# Patient Record
Sex: MALE | Race: BLACK OR AFRICAN AMERICAN | Employment: FULL TIME | URBAN - METROPOLITAN AREA
[De-identification: names, ages, dates, MRNs, and addresses within clinical notes are randomized per-mention and may not be internally consistent; named-entity substitution may affect disease eponyms.]

---

## 2017-02-02 ENCOUNTER — TELEPHONE (OUTPATIENT)
Dept: RHEUMATOLOGY | Age: 62
End: 2017-02-02

## 2017-02-02 NOTE — TELEPHONE ENCOUNTER
Called pt to informed him that his Methotrexate refill request was denied and that labs and a f/u appt were needed. The patient stated he would do so. Call txf to psr desk for scheduling.

## 2017-02-09 ENCOUNTER — LAB ONLY (OUTPATIENT)
Dept: FAMILY MEDICINE CLINIC | Age: 62
End: 2017-02-09

## 2017-02-09 DIAGNOSIS — M05.79 RHEUMATOID ARTHRITIS INVOLVING MULTIPLE SITES WITH POSITIVE RHEUMATOID FACTOR (HCC): ICD-10-CM

## 2017-02-10 DIAGNOSIS — M06.9 RHEUMATOID ARTHRITIS INVOLVING MULTIPLE SITES, UNSPECIFIED RHEUMATOID FACTOR PRESENCE: ICD-10-CM

## 2017-02-10 LAB — ERYTHROCYTE [SEDIMENTATION RATE] IN BLOOD BY WESTERGREN METHOD: 12 MM/HR (ref 0–30)

## 2017-02-10 RX ORDER — METHOTREXATE 2.5 MG/1
TABLET ORAL
Qty: 20 TAB | Refills: 0 | Status: SHIPPED | OUTPATIENT
Start: 2017-02-10 | End: 2017-03-29 | Stop reason: SDUPTHER

## 2017-02-13 DIAGNOSIS — I10 ESSENTIAL HYPERTENSION WITH GOAL BLOOD PRESSURE LESS THAN 140/90: ICD-10-CM

## 2017-02-14 RX ORDER — VALSARTAN AND HYDROCHLOROTHIAZIDE 320; 25 MG/1; MG/1
TABLET, FILM COATED ORAL
Qty: 30 TAB | Refills: 0 | Status: SHIPPED | OUTPATIENT
Start: 2017-02-14 | End: 2017-03-29 | Stop reason: SDUPTHER

## 2017-03-20 ENCOUNTER — OFFICE VISIT (OUTPATIENT)
Dept: FAMILY MEDICINE CLINIC | Age: 62
End: 2017-03-20

## 2017-03-20 VITALS
BODY MASS INDEX: 31.41 KG/M2 | HEIGHT: 70 IN | SYSTOLIC BLOOD PRESSURE: 149 MMHG | DIASTOLIC BLOOD PRESSURE: 83 MMHG | TEMPERATURE: 97.9 F | RESPIRATION RATE: 20 BRPM | HEART RATE: 59 BPM | WEIGHT: 219.4 LBS

## 2017-03-20 DIAGNOSIS — I10 ESSENTIAL HYPERTENSION WITH GOAL BLOOD PRESSURE LESS THAN 140/90: Primary | ICD-10-CM

## 2017-03-20 RX ORDER — AMLODIPINE BESYLATE 5 MG/1
5 TABLET ORAL DAILY
Qty: 30 TAB | Refills: 1 | Status: SHIPPED | OUTPATIENT
Start: 2017-03-20 | End: 2017-04-11 | Stop reason: SDUPTHER

## 2017-03-20 NOTE — MR AVS SNAPSHOT
Visit Information Date & Time Provider Department Dept. Phone Encounter #  
 3/20/2017  9:30 AM Douglas Sinha 34 [id number] Follow-up Instructions Return in about 6 months (around 9/20/2017) for blood pressure. Upcoming Health Maintenance Date Due Pneumococcal 19-64 Highest Risk (2 of 3 - PCV13) 10/10/2008 INFLUENZA AGE 9 TO ADULT 8/1/2016 GLAUCOMA SCREENING Q2Y 6/12/2017 FOBT Q 1 YEAR AGE 50-75 9/22/2017 DTaP/Tdap/Td series (2 - Td) 9/22/2026 Allergies as of 3/20/2017  Review Complete On: 3/20/2017 By: Ferny Truong MD  
  
 Severity Noted Reaction Type Reactions Codeine  03/15/2013    Other (comments) Talking out of head Current Immunizations  Reviewed on 9/22/2016 Name Date Influenza Vaccine 10/7/2013 Pneumococcal Vaccine (Unspecified Type) 10/10/2007 Not reviewed this visit You Were Diagnosed With   
  
 Codes Comments Essential hypertension with goal blood pressure less than 140/90    -  Primary ICD-10-CM: I10 
ICD-9-CM: 401.9 Vitals BP Pulse Temp Resp Height(growth percentile) Weight(growth percentile) 149/83 (BP 1 Location: Left arm, BP Patient Position: Sitting) (!) 59 97.9 °F (36.6 °C) (Oral) 20 5' 10\" (1.778 m) 219 lb 6.4 oz (99.5 kg) BMI Smoking Status 31.48 kg/m2 Never Smoker BMI and BSA Data Body Mass Index Body Surface Area  
 31.48 kg/m 2 2.22 m 2 Preferred Pharmacy [facility] Name Phone [id number], [address] [place] [phone] Your Updated Medication List  
  
   
This list is accurate as of: 3/20/17 10:41 AM.  Always use your most recent med list.  
  
  
  
  
 ALEVE 220 mg tablet Generic drug:  naproxen sodium Take 440 mg by mouth daily as needed. amLODIPine 5 mg tablet Commonly known as:  Kayla Spruce Take 1 Tab by mouth daily. folic acid 1 mg tablet Commonly known as:  Google Take 1 Tab by mouth daily. methotrexate 2.5 mg tablet Commonly known as:  Glenora Alderman 10 mg orally once weekly PriLOSEC 20 mg capsule Generic drug:  omeprazole Take 1 Cap by mouth daily. valsartan-hydroCHLOROthiazide 320-25 mg per tablet Commonly known as:  DIOVAN-HCT  
TAKE 1 TAB BY MOUTH DAILY. Prescriptions Sent to Pharmacy Refills  
 amLODIPine (NORVASC) 5 mg tablet 1 Sig: Take 1 Tab by mouth daily. Class: Normal  
 Pharmacy: [facility]/pharmacy #[id number] #: [phone] Route: Oral  
  
Follow-up Instructions Return in about 6 months (around 9/20/2017) for blood pressure. Introducing [facility] & HEALTH SERVICES! Dear Giovanna Vu: Thank you for requesting a [employer] account. Our records indicate that you have previously registered for a [employer] account but its currently inactive. Please call our [employer] support line at [phone]. Additional Information If you have questions, please visit the Frequently Asked Questions section of the [employer] website at [email or web address]/[email or web address]/. Remember, [employer] is NOT to be used for urgent needs. For medical emergencies, dial 911. Now available from your iPhone and Android! Please provide this summary of care documentation to your next provider. Your primary care clinician is listed as Stephani Suazo. If you have any questions after today's visit, please call [phone].

## 2017-03-29 DIAGNOSIS — M06.9 RHEUMATOID ARTHRITIS INVOLVING MULTIPLE SITES, UNSPECIFIED RHEUMATOID FACTOR PRESENCE: ICD-10-CM

## 2017-03-29 DIAGNOSIS — I10 ESSENTIAL HYPERTENSION WITH GOAL BLOOD PRESSURE LESS THAN 140/90: ICD-10-CM

## 2017-03-29 RX ORDER — METHOTREXATE 2.5 MG/1
TABLET ORAL
Qty: 20 TAB | Refills: 0 | Status: SHIPPED | OUTPATIENT
Start: 2017-03-29 | End: 2017-05-11 | Stop reason: SDUPTHER

## 2017-03-30 NOTE — TELEPHONE ENCOUNTER
Called and informed the patient that his methotrexate has been refilled and that he should schedule a f/u appt. Ms. Etta Moser stated he would.

## 2017-03-31 RX ORDER — VALSARTAN AND HYDROCHLOROTHIAZIDE 320; 25 MG/1; MG/1
TABLET, FILM COATED ORAL
Qty: 30 TAB | Refills: 1 | Status: SHIPPED | OUTPATIENT
Start: 2017-03-31 | End: 2017-04-11 | Stop reason: SDUPTHER

## 2017-04-11 ENCOUNTER — OFFICE VISIT (OUTPATIENT)
Dept: FAMILY MEDICINE CLINIC | Age: 62
End: 2017-04-11

## 2017-04-11 VITALS
SYSTOLIC BLOOD PRESSURE: 140 MMHG | RESPIRATION RATE: 20 BRPM | DIASTOLIC BLOOD PRESSURE: 82 MMHG | WEIGHT: 216 LBS | HEIGHT: 70 IN | BODY MASS INDEX: 30.92 KG/M2 | HEART RATE: 63 BPM | TEMPERATURE: 97.7 F

## 2017-04-11 DIAGNOSIS — I10 ESSENTIAL HYPERTENSION WITH GOAL BLOOD PRESSURE LESS THAN 140/90: ICD-10-CM

## 2017-04-11 DIAGNOSIS — I10 ESSENTIAL HYPERTENSION: Primary | ICD-10-CM

## 2017-04-11 RX ORDER — AMLODIPINE BESYLATE 5 MG/1
5 TABLET ORAL DAILY
Qty: 30 TAB | Refills: 5 | Status: SHIPPED | OUTPATIENT
Start: 2017-04-11 | End: 2017-11-07 | Stop reason: SDUPTHER

## 2017-04-11 RX ORDER — ACETAMINOPHEN 500 MG
TABLET ORAL
Qty: 1 KIT | Refills: 0 | Status: SHIPPED | OUTPATIENT
Start: 2017-04-11 | End: 2019-03-01

## 2017-04-11 RX ORDER — VALSARTAN AND HYDROCHLOROTHIAZIDE 320; 25 MG/1; MG/1
TABLET, FILM COATED ORAL
Qty: 30 TAB | Refills: 5 | Status: SHIPPED | OUTPATIENT
Start: 2017-04-11 | End: 2017-11-07 | Stop reason: SDUPTHER

## 2017-04-11 NOTE — MR AVS SNAPSHOT
Visit Information Date & Time Provider Department Dept. Phone Encounter #  
 4/11/2017 11:00 AM Douglas Castillo 34 [id number] Follow-up Instructions Return in about 3 months (around 7/11/2017), or if BP elevated. , for follow up HTN. Upcoming Health Maintenance Date Due Pneumococcal 19-64 Highest Risk (2 of 3 - PCV13) 10/10/2008 INFLUENZA AGE 9 TO ADULT 8/1/2016 GLAUCOMA SCREENING Q2Y 6/12/2017 FOBT Q 1 YEAR AGE 50-75 9/22/2017 DTaP/Tdap/Td series (2 - Td) 9/22/2026 Allergies as of 4/11/2017  Review Complete On: 4/11/2017 By: Noman Noyola LPN Severity Noted Reaction Type Reactions Codeine  03/15/2013    Other (comments) Talking out of head Current Immunizations  Reviewed on 4/11/2017 Name Date Influenza Vaccine 10/7/2013 Pneumococcal Vaccine (Unspecified Type) 10/10/2007 Reviewed by Noman Noyola LPN on 6/63/8480 at 21:19 AM  
You Were Diagnosed With   
  
 Codes Comments Essential hypertension    -  Primary ICD-10-CM: I10 
ICD-9-CM: 401.9 Essential hypertension with goal blood pressure less than 140/90     ICD-10-CM: I10 
ICD-9-CM: 401.9 Vitals BP Pulse Temp Resp Height(growth percentile) Weight(growth percentile) 140/82 (BP 1 Location: Left arm, BP Patient Position: Sitting) 63 97.7 °F (36.5 °C) (Oral) 20 5' 10\" (1.778 m) 216 lb (98 kg) BMI Smoking Status 30.99 kg/m2 Never Smoker BMI and BSA Data Body Mass Index Body Surface Area 30.99 kg/m 2 2.2 m 2 Preferred Pharmacy Pharmacy Name Phone [facility][id number]- [facility], [address] [place] [address] [phone] Your Updated Medication List  
  
   
This list is accurate as of: 4/11/17 11:32 AM.  Always use your most recent med list.  
  
  
  
  
 ALEVE 220 mg tablet Generic drug:  naproxen sodium Take 440 mg by mouth daily as needed. amLODIPine 5 mg tablet Commonly known as:  Mani Duque Take 1 Tab by mouth daily. For hypertension Blood Pressure Monitor Kit Commonly known as:  BLOOD PRESSURE KIT Check blood pressure twice daily and record values. Goal is less than 140/90. folic acid 1 mg tablet Commonly known as:  Google Take 1 Tab by mouth daily. methotrexate 2.5 mg tablet Commonly known as:  Jovanna Aaron 10 mg orally once weekly PriLOSEC 20 mg capsule Generic drug:  omeprazole Take 1 Cap by mouth daily. valsartan-hydroCHLOROthiazide 320-25 mg per tablet Commonly known as:  DIOVAN-HCT  
TAKE 1 TAB BY MOUTH DAILY for hypertension. Prescriptions Printed Refills Blood Pressure Monitor (BLOOD PRESSURE KIT) kit 0 Sig: Check blood pressure twice daily and record values. Goal is less than 140/90. Class: Print Prescriptions Sent to Pharmacy Refills  
 valsartan-hydroCHLOROthiazide (DIOVAN-HCT) 320-25 mg per tablet 5 Sig: TAKE 1 TAB BY MOUTH DAILY for hypertension. Class: Normal  
 Pharmacy: [facility]/pharmacy #[id number] Ph #: [phone]  
 amLODIPine (NORVASC) 5 mg tablet 5 Sig: Take 1 Tab by mouth daily. For hypertension Class: Normal  
 Pharmacy: [facility]/pharmacy #[facility] Ph #: [phone] Route: Oral  
  
Follow-up Instructions Return in about 3 months (around 7/11/2017), or if BP elevated. , for follow up HTN. Patient Instructions Check blood pressure twice daily and record values. Goal is less than 140/90. Introducing [facility] & HEALTH SERVICES! Dear Ej Rivera: Thank you for requesting a [employer] account. Our records indicate that you have previously registered for a [employer] account but its currently inactive. Please call our [employer] support line at [phone]. Additional Information If you have questions, please visit the Frequently Asked Questions section of the [employer] website at [email or web address] Remember, [email or web address] is NOT to be used for urgent needs. For medical emergencies, dial 911. Now available from your iPhone and Android! Please provide this summary of care documentation to your next provider. Your primary care clinician is listed as Savannah Naidu. If you have any questions after today's visit, please call [phone].

## 2017-05-04 ENCOUNTER — TELEPHONE (OUTPATIENT)
Dept: RHEUMATOLOGY | Age: 62
End: 2017-05-04

## 2017-05-04 NOTE — TELEPHONE ENCOUNTER
Left message for patient to return phone call. Reason for call: Med refill denied. Needs labs and f/u.

## 2017-05-08 ENCOUNTER — TELEPHONE (OUTPATIENT)
Dept: RHEUMATOLOGY | Age: 62
End: 2017-05-08

## 2017-05-08 NOTE — TELEPHONE ENCOUNTER
Left message for patient to return phone call. Reason for call:    Returned pt's call. Pt needs f/u appt. His last med refill request was denied.

## 2017-05-11 ENCOUNTER — OFFICE VISIT (OUTPATIENT)
Dept: RHEUMATOLOGY | Age: 62
End: 2017-05-11

## 2017-05-11 VITALS
HEIGHT: 70 IN | DIASTOLIC BLOOD PRESSURE: 88 MMHG | RESPIRATION RATE: 16 BRPM | WEIGHT: 218 LBS | SYSTOLIC BLOOD PRESSURE: 146 MMHG | TEMPERATURE: 97.1 F | BODY MASS INDEX: 31.21 KG/M2 | HEART RATE: 58 BPM | OXYGEN SATURATION: 97 %

## 2017-05-11 DIAGNOSIS — Z79.60 LONG-TERM USE OF IMMUNOSUPPRESSANT MEDICATION: ICD-10-CM

## 2017-05-11 DIAGNOSIS — Z85.528 HISTORY OF RENAL CELL CANCER: ICD-10-CM

## 2017-05-11 DIAGNOSIS — M06.9 RHEUMATOID ARTHRITIS INVOLVING MULTIPLE SITES, UNSPECIFIED RHEUMATOID FACTOR PRESENCE: Primary | ICD-10-CM

## 2017-05-11 RX ORDER — METHOTREXATE 2.5 MG/1
TABLET ORAL
Qty: 20 TAB | Refills: 4 | Status: SHIPPED | OUTPATIENT
Start: 2017-05-11 | End: 2018-04-25 | Stop reason: SDUPTHER

## 2017-05-11 RX ORDER — METHYLPREDNISOLONE 4 MG/1
TABLET ORAL
Qty: 1 DOSE PACK | Refills: 0 | Status: SHIPPED | OUTPATIENT
Start: 2017-05-11 | End: 2017-09-18

## 2017-05-11 NOTE — PROGRESS NOTES
HISTORY OF PRESENT ILLNESS  Jose Johnson is a 59 y.o. male. HPI Patient presents for follow up of rheumatoid arthritis. This is his first visit in over one year. Overall, he has been doing okay. He may note a flare of pain every 2-3 months that will last one day. He has some pain in the knees. He notes no joint swelling. AM stiffness of a few minutes is noted. He has no fevers or weight loss. He has been taking methotrexate 10 mg weekly. He takes Aleve 2 tabs daily only if needed (taken today as out of medication). He still tries to play tennis 3 days weekly. Of note, he relates that he became a grandfather a few months ago.   Interim history reviewed   Current Outpatient Prescriptions   Medication Sig Dispense Refill    Blood Pressure Monitor (BLOOD PRESSURE KIT) kit Check blood pressure twice daily and record values. Goal is less than 140/90. 1 Kit 0    valsartan-hydroCHLOROthiazide (DIOVAN-HCT) 320-25 mg per tablet TAKE 1 TAB BY MOUTH DAILY for hypertension. 30 Tab 5    amLODIPine (NORVASC) 5 mg tablet Take 1 Tab by mouth daily. For hypertension 30 Tab 5    methotrexate (RHEUMATREX) 2.5 mg tablet 10 mg orally once weekly 20 Tab 0    omeprazole (PRILOSEC) 20 mg capsule Take 1 Cap by mouth daily.  folic acid (FOLVITE) 1 mg tablet Take 1 Tab by mouth daily. 90 Tab 3    naproxen sodium (ALEVE) 220 mg tablet Take 440 mg by mouth daily as needed. Allergies   Allergen Reactions    Codeine Other (comments)     Talking out of head       Review of Systems   Constitutional: Negative for fever and weight loss. Eyes: Negative for blurred vision. Respiratory: Negative for cough. Cardiovascular: Negative for leg swelling. Gastrointestinal: Negative for abdominal pain. Physical Exam   Vitals reviewed.   GENERAL: well developed, well nourished, in no apparent distress    E/N/T: Oropharynx: normal mucosa, palate, and posterior pharynx;    NECK: Neck is supple with full range of motion;    RESPIRATORY: lungs clear with BS=B/L;    CARDIOVASCULAR: regular rhythm; no murmur; no edema;    GASTROINTESTINAL: nontender; no organomegaly;    LYMPHATIC: no enlargement of cervical nodes;    MUSCULOSKELETAL:   -Full peripheral joint examination reveals no joint tenderness or synovitis. -right shoulder painful flexion and abduction to 100 degrees  -right knee painful flexion to 90 degrees  SKIN: no rashes   PSYCHIATRIC: mental status: alert and oriented x 3; good insight and judgement    Lab Results   Component Value Date/Time    WBC 5.9 02/09/2017 11:46 AM    HGB 13.0 02/09/2017 11:46 AM    HCT 38.7 02/09/2017 11:46 AM    PLATELET 605 61/66/0607 11:46 AM    MCV 96 02/09/2017 11:46 AM     Lab Results   Component Value Date/Time    Sodium 141 02/09/2017 11:46 AM    Potassium 4.1 02/09/2017 11:46 AM    Chloride 99 02/09/2017 11:46 AM    CO2 26 02/09/2017 11:46 AM    Anion gap 9 02/23/2010 04:36 PM    Glucose 86 02/09/2017 11:46 AM    BUN 16 02/09/2017 11:46 AM    Creatinine 1.15 02/09/2017 11:46 AM    BUN/Creatinine ratio 14 02/09/2017 11:46 AM    GFR est AA 78 02/09/2017 11:46 AM    GFR est non-AA 67 02/09/2017 11:46 AM    Calcium 9.0 02/09/2017 11:46 AM    Bilirubin, total 0.5 02/09/2017 11:46 AM    AST (SGOT) 18 02/09/2017 11:46 AM    Alk. phosphatase 85 02/09/2017 11:46 AM    Protein, total 7.4 02/09/2017 11:46 AM    Albumin 4.0 02/09/2017 11:46 AM    Globulin 4.0 02/23/2010 04:36 PM    A-G Ratio 1.2 02/09/2017 11:46 AM    ALT (SGPT) 11 02/09/2017 11:46 AM     Lab Results   Component Value Date/Time    Sed rate (ESR) 12 02/09/2017 11:47 AM     MHAQ 0.750  CDAI 5/11/2017   Swollen Joint Count 0   Tender Joint Count 0   Physician Assessment (0-10) 3   Patient Assessment (0-10) 2   CDAI 5       ASSESSMENT and PLAN    ICD-10-CM ICD-9-CM    1. Seropositive rheumatoid arthritis of multiple sites [facility]): Seropositive. Has been doing well on methotrexate 10 mg weekly.  He is out of medication this week and has noted a mild flare. Generally, he does not take Aleve or NSAIDS. M05.79 714.0 -methotrexate 10 mg weekly  -medrol dose pack (not with Aleve)  -comfortable, low impact exercise encouraged  -call if persistent pain or flare in between visits   2. Long-term use of immunosuppressant medication Z79.899 V58.69 CBC and CMP quarterly   3. History of renal cell cancer: s/p nephrectomy. Creatinine stable. UA last year no hematuria.   S42.616 V10.52 -encouraged him to check with cancer registry to see if any regular follow up/ monitoring suggested

## 2017-05-11 NOTE — MR AVS SNAPSHOT
Visit Information Date & Time Provider Department Dept. Phone Encounter #  
 5/11/2017  2:00 PM Gurinder Taylor MD [facility] [id number] Follow-up Instructions Return in about 6 months (around 11/11/2017). Your Appointments 7/11/2017  3:45 PM  
ROUTINE CARE with Scarlet Zaldivar MD  
[address] [place] [address]) Appt Note: 3 months BP  
 [address] [facility]  
[phone]  
  
   
 [address] [place] [address] [address] [address] Upcoming Health Maintenance Date Due Pneumococcal 19-64 Highest Risk (2 of 3 - PCV13) 10/10/2008 GLAUCOMA SCREENING Q2Y 6/12/2017 INFLUENZA AGE 9 TO ADULT 8/1/2017 FOBT Q 1 YEAR AGE 50-75 9/22/2017 DTaP/Tdap/Td series (2 - Td) 9/22/2026 Allergies as of 5/11/2017  Review Complete On: 5/11/2017 By: Gurinder Taylor MD  
  
 Severity Noted Reaction Type Reactions Codeine  03/15/2013    Other (comments) Talking out of head Current Immunizations  Reviewed on 5/11/2017 Name Date Influenza Vaccine 10/7/2013 Pneumococcal Vaccine (Unspecified Type) 10/10/2007 Reviewed by Mary Garland LPN on 6/39/1740 at  1:56 PM  
You Were Diagnosed With   
  
 Codes Comments Rheumatoid arthritis involving multiple sites, unspecified rheumatoid factor presence ([facility].)    -  Primary ICD-10-CM: M06.9 ICD-9-CM: 714.0 Long-term use of immunosuppressant medication     ICD-10-CM: Z79.899 ICD-9-CM: V58.69 History of renal cell cancer     ICD-10-CM: Z85.528 ICD-9-CM: V10.52 Vitals BP Pulse Temp Resp Height(growth percentile) Weight(growth percentile) 146/88 (BP 1 Location: Left arm, BP Patient Position: Sitting) (!) 58 97.1 °F (36.2 °C) (Oral) 16 5' 10\" (1.778 m) 218 lb (98.9 kg) SpO2 BMI Smoking Status 97% 31.28 kg/m2 Never Smoker Vitals History BMI and BSA Data Body Mass Index Body Surface Area  
 31.28 kg/m 2 2.21 m 2 Preferred Pharmacy Pharmacy Name Phone [facility] #[id number] Rubio, [address] [phone] Your Updated Medication List  
  
   
This list is accurate as of: 5/11/17  2:19 PM.  Always use your most recent med list.  
  
  
  
  
 ALEVE 220 mg tablet Generic drug:  naproxen sodium Take 440 mg by mouth daily as needed. amLODIPine 5 mg tablet Commonly known as:  Claudell Hesselbach Take 1 Tab by mouth daily. For hypertension Blood Pressure Monitor Kit Commonly known as:  BLOOD PRESSURE KIT Check blood pressure twice daily and record values. Goal is less than 140/90. folic acid 1 mg tablet Commonly known as:  Carter Take 1 Tab by mouth daily. methotrexate 2.5 mg tablet Commonly known as:  Sruthi Halle 10 mg orally once weekly  
  
 methylPREDNISolone 4 mg tablet Commonly known as:  Po Olsen Take each day's dose in AM with food PriLOSEC 20 mg capsule Generic drug:  omeprazole Take 1 Cap by mouth daily. valsartan-hydroCHLOROthiazide 320-25 mg per tablet Commonly known as:  DIOVAN-HCT  
TAKE 1 TAB BY MOUTH DAILY for hypertension. Prescriptions Sent to Pharmacy Refills  
 methylPREDNISolone (MEDROL DOSEPACK) 4 mg tablet 0 Sig: Take each day's dose in AM with food Class: Normal  
 Pharmacy: [facility]/pharmacy #[id number] Ph #: [phone]  
 methotrexate (RHEUMATREX) 2.5 mg tablet 4 Sig: 10 mg orally once weekly Class: Normal  
 Pharmacy: [facility]/pharmacy #[id number] Ph #: [phone] We Performed the Following CBC WITH AUTOMATED DIFF [05849 CPT(R)] METABOLIC PANEL, COMPREHENSIVE [38023 CPT(R)] SED RATE (ESR) [id number] CPT(R)] Follow-up Instructions Return in about 6 months (around 11/11/2017). Patient Instructions Steroid taper Methotrexate 4 pills weekly Labs every 3 months Introducing [facility] SERVICES! Dear Ej Rivera: Thank you for requesting a [employer] account. Our records indicate that you have previously registered for a [employer] account but its currently inactive. Please call our [employer] support line at [phone]. Additional Information If you have questions, please visit the Frequently Asked Questions section of the [employer] website at [email or web address] Remember, [employer] is NOT to be used for urgent needs. For medical emergencies, dial 911. Now available from your iPhone and Android! Please provide this summary of care documentation to your next provider. Your primary care clinician is listed as Bj Mccrary. If you have any questions after today's visit, please call [phone].

## 2017-05-12 LAB
ALBUMIN SERPL-MCNC: 4.3 G/DL (ref 3.6–4.8)
ALBUMIN/GLOB SERPL: 1.4 {RATIO} (ref 1.2–2.2)
ALP SERPL-CCNC: 85 IU/L (ref 39–117)
ALT SERPL-CCNC: 11 IU/L (ref 0–44)
AST SERPL-CCNC: 19 IU/L (ref 0–40)
BASOPHILS # BLD AUTO: 0 X10E3/UL (ref 0–0.2)
BASOPHILS NFR BLD AUTO: 0 %
BILIRUB SERPL-MCNC: 0.6 MG/DL (ref 0–1.2)
BUN SERPL-MCNC: 10 MG/DL (ref 8–27)
BUN/CREAT SERPL: 8 (ref 10–24)
CALCIUM SERPL-MCNC: 9.2 MG/DL (ref 8.6–10.2)
CHLORIDE SERPL-SCNC: 101 MMOL/L (ref 96–106)
CO2 SERPL-SCNC: 24 MMOL/L (ref 18–29)
CREAT SERPL-MCNC: 1.18 MG/DL (ref 0.76–1.27)
EOSINOPHIL # BLD AUTO: 0.2 X10E3/UL (ref 0–0.4)
EOSINOPHIL NFR BLD AUTO: 4 %
ERYTHROCYTE [DISTWIDTH] IN BLOOD BY AUTOMATED COUNT: 16 % (ref 12.3–15.4)
ERYTHROCYTE [SEDIMENTATION RATE] IN BLOOD BY WESTERGREN METHOD: 18 MM/HR (ref 0–30)
GLOBULIN SER CALC-MCNC: 3 G/DL (ref 1.5–4.5)
GLUCOSE SERPL-MCNC: 90 MG/DL (ref 65–99)
HCT VFR BLD AUTO: 37 % (ref 37.5–51)
HGB BLD-MCNC: 12.1 G/DL (ref 12.6–17.7)
IMM GRANULOCYTES # BLD: 0 X10E3/UL (ref 0–0.1)
IMM GRANULOCYTES NFR BLD: 0 %
LYMPHOCYTES # BLD AUTO: 1.7 X10E3/UL (ref 0.7–3.1)
LYMPHOCYTES NFR BLD AUTO: 30 %
MCH RBC QN AUTO: 32 PG (ref 26.6–33)
MCHC RBC AUTO-ENTMCNC: 32.7 G/DL (ref 31.5–35.7)
MCV RBC AUTO: 98 FL (ref 79–97)
MONOCYTES # BLD AUTO: 0.6 X10E3/UL (ref 0.1–0.9)
MONOCYTES NFR BLD AUTO: 10 %
NEUTROPHILS # BLD AUTO: 3.2 X10E3/UL (ref 1.4–7)
NEUTROPHILS NFR BLD AUTO: 56 %
PLATELET # BLD AUTO: 238 X10E3/UL (ref 150–379)
POTASSIUM SERPL-SCNC: 4.1 MMOL/L (ref 3.5–5.2)
PROT SERPL-MCNC: 7.3 G/DL (ref 6–8.5)
RBC # BLD AUTO: 3.78 X10E6/UL (ref 4.14–5.8)
SODIUM SERPL-SCNC: 141 MMOL/L (ref 134–144)
WBC # BLD AUTO: 5.6 X10E3/UL (ref 3.4–10.8)

## 2017-05-12 NOTE — PROGRESS NOTES
Called patient reviewed below results verbalized understanding. Mild anemia (Please add on B12 and folate for macrocytic anemia).  Please monitor CBC 4 weeks.

## 2017-09-18 ENCOUNTER — OFFICE VISIT (OUTPATIENT)
Dept: FAMILY MEDICINE CLINIC | Age: 62
End: 2017-09-18

## 2017-09-18 VITALS
WEIGHT: 208 LBS | RESPIRATION RATE: 18 BRPM | HEART RATE: 57 BPM | BODY MASS INDEX: 29.78 KG/M2 | TEMPERATURE: 98.1 F | HEIGHT: 70 IN | SYSTOLIC BLOOD PRESSURE: 136 MMHG | DIASTOLIC BLOOD PRESSURE: 87 MMHG

## 2017-09-18 DIAGNOSIS — I10 ESSENTIAL HYPERTENSION WITH GOAL BLOOD PRESSURE LESS THAN 140/90: Primary | ICD-10-CM

## 2017-09-18 NOTE — PROGRESS NOTES
HISTORY OF PRESENT ILLNESS  Guido Del Rio is a 57 y.o. male. Blood Pressure Check   The history is provided by the patient. This is a chronic problem. The current episode started more than 1 week ago. The problem occurs constantly. The problem has been gradually improving. Pertinent negatives include no chest pain, no abdominal pain, no headaches and no shortness of breath. Nothing aggravates the symptoms. The symptoms are relieved by medications. Treatments tried: Diovan Hct, Norvasc. The treatment provided significant relief. Review of Systems   Constitutional: Positive for weight loss. No weight gain   Eyes: Negative for blurred vision. Respiratory: Negative for shortness of breath. Cardiovascular: Negative for chest pain and leg swelling. Gastrointestinal: Negative for abdominal pain. Neurological: Negative for dizziness, sensory change, speech change, focal weakness and headaches. Visit Vitals    /87    Pulse (!) 57    Temp 98.1 °F (36.7 °C) (Oral)    Resp 18    Ht 5' 10\" (1.778 m)    Wt 208 lb (94.3 kg)    BMI 29.84 kg/m2     BP Readings from Last 3 Encounters:   09/18/17 136/87   05/11/17 146/88   04/11/17 140/82     Physical Exam   Constitutional: He is oriented to person, place, and time. He appears well-developed and well-nourished. No distress. Cardiovascular: Normal rate, regular rhythm and normal heart sounds. Exam reveals no gallop and no friction rub. No murmur heard. Pulmonary/Chest: Effort normal and breath sounds normal. No respiratory distress. He has no wheezes. He has no rales. Musculoskeletal: He exhibits no edema. Neurological: He is alert and oriented to person, place, and time. Skin: Skin is warm and dry. He is not diaphoretic. Nursing note and vitals reviewed. ASSESSMENT and PLAN    ICD-10-CM ICD-9-CM    1.  Essential hypertension with goal blood pressure less than 140/90 I10 401.9         Blood pressure controlled  Continue current plans. Follow-up Disposition:  Return in about 6 months (around 3/18/2018) for blood pressure. Reviewed plan of care. Patient has provided input and agrees with goals.

## 2017-09-18 NOTE — MR AVS SNAPSHOT
Visit Information Date & Time Provider Department Dept. Phone Encounter #  
 9/18/2017  2:45 PM Judit Escalera, [address] [id number] Follow-up Instructions Return in about 6 months (around 3/18/2018) for blood pressure. Your Appointments 11/14/2017  1:00 PM  
ESTABLISHED PATIENT with Naldo Moran MD  
[address] ([facility]) Appt Note: fu 6 months; .  
 [address] [facility] [address]  
[phone]  
  
   
 [facility] [address] Upcoming Health Maintenance Date Due Pneumococcal 19-64 Highest Risk (2 of 3 - PCV13) 10/10/2008 GLAUCOMA SCREENING Q2Y 6/12/2017 INFLUENZA AGE 9 TO ADULT 8/1/2017 FOBT Q 1 YEAR AGE 50-75 9/22/2017 DTaP/Tdap/Td series (2 - Td) 9/22/2026 Allergies as of 9/18/2017  Review Complete On: 9/18/2017 By: Judit Escalera MD  
  
 Severity Noted Reaction Type Reactions Codeine  03/15/2013    Other (comments) Talking out of head Current Immunizations  Reviewed on 5/11/2017 Name Date Influenza Vaccine 10/7/2013 ZZZ-RETIRED (DO NOT USE) Pneumococcal Vaccine (Unspecified Type) 10/10/2007 Not reviewed this visit You Were Diagnosed With   
  
 Codes Comments Essential hypertension with goal blood pressure less than 140/90    -  Primary ICD-10-CM: I10 
ICD-9-CM: 401.9 Vitals BP Pulse Temp Resp Height(growth percentile) Weight(growth percentile) 136/87 (!) 57 98.1 °F (36.7 °C) (Oral) 18 5' 10\" (1.778 m) 208 lb (94.3 kg) BMI Smoking Status 29.84 kg/m2 Never Smoker Vitals History BMI and BSA Data Body Mass Index Body Surface Area  
 29.84 kg/m 2 2.16 m 2 Preferred Pharmacy [facility] Name Phone [id number], [address] [place] [address] [phone] Your Updated Medication List  
  
   
 This list is accurate as of: 9/18/17  3:02 PM.  Always use your most recent med list.  
  
  
  
  
 ALEVE 220 mg tablet Generic drug:  naproxen sodium Take 440 mg by mouth daily as needed. amLODIPine 5 mg tablet Commonly known as:  Remonia Grates Take 1 Tab by mouth daily. For hypertension Blood Pressure Monitor Kit Commonly known as:  BLOOD PRESSURE KIT Check blood pressure twice daily and record values. Goal is less than 140/90. methotrexate 2.5 mg tablet Commonly known as:  Dunn George 10 mg orally once weekly PriLOSEC 20 mg capsule Generic drug:  omeprazole Take 1 Cap by mouth daily. valsartan-hydroCHLOROthiazide 320-25 mg per tablet Commonly known as:  DIOVAN-HCT  
TAKE 1 TAB BY MOUTH DAILY for hypertension. Follow-up Instructions Return in about 6 months (around 3/18/2018) for blood pressure. Introducing [facility] & HEALTH SERVICES! Dear Graeme Quiles: Thank you for requesting a "[employer] account. Our records indicate that you have previously registered for a "[employer] account but its currently inactive. Please call our "[employer] support line at [phone]. Additional Information If you have questions, please visit the Frequently Asked Questions section of the "[employer] website at [email or web address] Remember, "[employer] is NOT to be used for urgent needs. For medical emergencies, dial 911. Now available from your iPhone and Android! Please provide this summary of care documentation to your next provider. Your primary care clinician is listed as Candie Matt. If you have any questions after today's visit, please call [phone].

## 2017-11-07 ENCOUNTER — TELEPHONE (OUTPATIENT)
Dept: FAMILY MEDICINE CLINIC | Age: 62
End: 2017-11-07

## 2017-11-07 ENCOUNTER — TELEPHONE (OUTPATIENT)
Dept: RHEUMATOLOGY | Age: 62
End: 2017-11-07

## 2017-11-07 DIAGNOSIS — I10 ESSENTIAL HYPERTENSION WITH GOAL BLOOD PRESSURE LESS THAN 140/90: ICD-10-CM

## 2017-11-07 DIAGNOSIS — M06.9 RHEUMATOID ARTHRITIS INVOLVING MULTIPLE SITES, UNSPECIFIED RHEUMATOID FACTOR PRESENCE: ICD-10-CM

## 2017-11-07 RX ORDER — METHOTREXATE 2.5 MG/1
TABLET ORAL
Qty: 20 TAB | Refills: 4 | OUTPATIENT
Start: 2017-11-07

## 2017-11-07 RX ORDER — VALSARTAN AND HYDROCHLOROTHIAZIDE 320; 25 MG/1; MG/1
TABLET, FILM COATED ORAL
Qty: 30 TAB | Refills: 11 | Status: SHIPPED | OUTPATIENT
Start: 2017-11-07 | End: 2018-07-18 | Stop reason: ALTCHOICE

## 2017-11-07 RX ORDER — AMLODIPINE BESYLATE 5 MG/1
5 TABLET ORAL DAILY
Qty: 30 TAB | Refills: 11 | Status: SHIPPED | OUTPATIENT
Start: 2017-11-07 | End: 2018-09-25 | Stop reason: SDUPTHER

## 2017-11-07 NOTE — TELEPHONE ENCOUNTER
----- Message from Obey Hendrickson sent at 11/7/2017  1:49 PM EST -----  Regarding: Dr. Donovan Alfonso is requesting a call back with the status of the refill request for Rx's Amlodopine and Valsartan that was sent to the office yesterday 11/6/17. ([facility])[phone]

## 2017-11-07 NOTE — TELEPHONE ENCOUNTER
HIPAA verified by two patient identifiers. Spoke with patient  And explained Xochitl Yuan is currently out of the office and we are not scheduling appointments with him until further notice.  patients that need medication refills will need to be evaluated by MD.  Patients have the option of staying with our practice or we can refer  you to a local rheumatologist.Patient given and appointment with  on Feb.8, 2018, and letter of rheumatologist also sent to patients home.

## 2017-11-07 NOTE — TELEPHONE ENCOUNTER
Pt called stating his rheumatologist, Dr. Niles Vitale is not practicing at this time, pt unable to get appointment until 2/9/18, is running out of his methotrexate, can't go without this, and was advised by Dr. Candiss Merlin office to have pcp fill it until he can get in for appointment.  Jasonm

## 2017-11-15 NOTE — TELEPHONE ENCOUNTER
Patient informed, Dr. Zayra Cruz denied his request for refill of Methotrexate, she wants him to get refills from his Rheumatologist.  Dr. Zayra Cruz was made aware of fact patient has appointment with new Rheumatologist on 2/8/18, because his former Rheumatologist is no longer practicing in that office.

## 2018-04-25 ENCOUNTER — OFFICE VISIT (OUTPATIENT)
Dept: FAMILY MEDICINE CLINIC | Age: 63
End: 2018-04-25

## 2018-04-25 VITALS
HEIGHT: 70 IN | DIASTOLIC BLOOD PRESSURE: 81 MMHG | HEART RATE: 59 BPM | RESPIRATION RATE: 18 BRPM | BODY MASS INDEX: 30.78 KG/M2 | WEIGHT: 215 LBS | SYSTOLIC BLOOD PRESSURE: 123 MMHG | TEMPERATURE: 98.4 F

## 2018-04-25 DIAGNOSIS — M06.9 RHEUMATOID ARTHRITIS INVOLVING MULTIPLE SITES, UNSPECIFIED RHEUMATOID FACTOR PRESENCE: ICD-10-CM

## 2018-04-25 DIAGNOSIS — E78.00 ELEVATED CHOLESTEROL: ICD-10-CM

## 2018-04-25 DIAGNOSIS — Z13.5 GLAUCOMA SCREENING: ICD-10-CM

## 2018-04-25 DIAGNOSIS — M05.79 SEROPOSITIVE RHEUMATOID ARTHRITIS OF MULTIPLE SITES (HCC): ICD-10-CM

## 2018-04-25 DIAGNOSIS — K21.9 GASTROESOPHAGEAL REFLUX DISEASE, ESOPHAGITIS PRESENCE NOT SPECIFIED: ICD-10-CM

## 2018-04-25 DIAGNOSIS — Z12.11 ENCOUNTER FOR FIT (FECAL IMMUNOCHEMICAL TEST) SCREENING: ICD-10-CM

## 2018-04-25 DIAGNOSIS — I10 ESSENTIAL HYPERTENSION WITH GOAL BLOOD PRESSURE LESS THAN 140/90: Primary | ICD-10-CM

## 2018-04-25 RX ORDER — METHOTREXATE 2.5 MG/1
TABLET ORAL
Qty: 20 TAB | Refills: 1 | Status: SHIPPED | OUTPATIENT
Start: 2018-04-25 | End: 2018-05-07 | Stop reason: SDUPTHER

## 2018-04-25 NOTE — PROGRESS NOTES
Chief Complaint   Patient presents with    Blood Pressure Check     6 mo f/u     1. Have you been to the ER, urgent care clinic since your last visit? No Hospitalized since your last visit? No     2. Have you seen or consulted any other health care providers outside of the [facility] since your last visit? Include any pap smears or colon screening.  No    Pt declines pneumonia vaccine

## 2018-04-25 NOTE — MR AVS SNAPSHOT
[address] 
[phone] Patient: Matthew Ashton MRN: [id number] VUD:[birth year] Visit Information Date & Time Provider Department Dept. Phone Encounter #  
 4/25/2018  9:00 AM [address] [id number] Your Appointments 5/7/2018  4:00 PM  
ACUTE CARE with Valencia Cantu MD  
[address] ([facility]) Appt Note: New Patient (Deion Quezada) Refills; rescheduled from 02/08/18; Okay per Dr Irby [place] [address]  
[phone]  
  
   
 [facility] [address] [id number] Upcoming Health Maintenance Date Due Pneumococcal 19-64 Highest Risk (2 of 3 - PCV13) 10/10/2008 GLAUCOMA SCREENING Q2Y 6/12/2017 Influenza Age 5 to Adult 8/1/2017 FOBT Q 1 YEAR AGE 50-75 9/22/2017 DTaP/Tdap/Td series (2 - Td) 9/22/2026 Allergies as of 4/25/2018  Review Complete On: 4/25/2018 By: Nancy Hammond MD  
  
 Severity Noted Reaction Type Reactions Codeine  03/15/2013    Other (comments) Talking out of head Current Immunizations  Reviewed on 5/11/2017 Name Date Influenza Vaccine 10/7/2013 ZZZ-RETIRED (DO NOT USE) Pneumococcal Vaccine (Unspecified Type) 10/10/2007 Not reviewed this visit You Were Diagnosed With   
  
 Codes Comments Essential hypertension with goal blood pressure less than 140/90    -  Primary ICD-10-CM: I10 
ICD-9-CM: 401.9 Glaucoma screening     ICD-10-CM: Z13.5 ICD-9-CM: V80.1 Encounter for FIT (fecal immunochemical test) screening     ICD-10-CM: Z12.11 ICD-9-CM: V76.51 Seropositive rheumatoid arthritis of multiple sites [facility])     ICD-10-CM: M05.79 ICD-9-CM: 714.0 Elevated cholesterol     ICD-10-CM: E78.00 ICD-9-CM: 272.0  Rheumatoid arthritis involving multiple sites, unspecified rheumatoid factor presence ([facility]     ICD-10-CM: M06.9 ICD-9-CM: 714.0 Vitals BP Pulse Temp Resp Height(growth percentile) Weight(growth percentile) 123/81 (!) 59 98.4 °F (36.9 °C) (Oral) 18 5' 10\" (1.778 m) 215 lb (97.5 kg) BMI Smoking Status 30.85 kg/m2 Never Smoker Vitals History BMI and BSA Data Body Mass Index Body Surface Area  
 30.85 kg/m 2 2.19 m 2 Preferred Pharmacy Pharmacy Name Phone [facility]/PHARMACY #[id number]- [place], [address] [facility] [address] [phone] Your Updated Medication List  
  
   
This list is accurate as of 4/25/18 10:27 AM.  Always use your most recent med list. amLODIPine 5 mg tablet Commonly known as:  Skip Newcomer Take 1 Tab by mouth daily. For hypertension Blood Pressure Monitor Kit Commonly known as:  BLOOD PRESSURE KIT Check blood pressure twice daily and record values. Goal is less than 140/90. methotrexate 2.5 mg tablet Commonly known as:  Jose De Jesus Roya 10 mg orally once weekly  
  
 valsartan-hydroCHLOROthiazide 320-25 mg per tablet Commonly known as:  DIOVAN-HCT  
TAKE 1 TAB BY MOUTH DAILY for hypertension. Prescriptions Sent to Pharmacy Refills  
 methotrexate (RHEUMATREX) 2.5 mg tablet 1 Sig: 10 mg orally once weekly Class: Normal  
 Pharmacy: [facility]/pharmacy #[id number] [place], [address] [place] [address] Ph #: [phone] We Performed the Following C REACTIVE PROTEIN, QT [92749 CPT(R)] CBC WITH AUTOMATED DIFF [61344 CPT(R)] METABOLIC PANEL, COMPREHENSIVE [56275 CPT(R)] NMR LIPOPROFILE [id number] CPT(R)] OCCULT BLOOD, IMMUNOASSAY (FIT) [id number] CPT(R)] REFERRAL TO OPHTHALMOLOGY [REF57 Custom] Comments:  
 Glaucoma screening, Rheumatoid Arthritis REFERRAL TO RHEUMATOLOGY [DOB99 Custom] Comments:  
 Rheumatoid Arthritis To-Do List   
 04/25/2018 Lab:  SED RATE (ESR) Referral Information Referral ID Referred By Referred To  
  
 [id number] Rajani Cotton [facility] [address] [place], [address] [facility] Visits Status Start Date End Date 1 New Request 4/25/18 4/25/19 If your referral has a status of pending review or denied, additional information will be sent to support the outcome of this decision. Referral ID Referred By Referred To  
 [id number] Chadwick Nj MD  
   [address] [facility] [address]  
   [address], [place] [address] Phone: [phone] Fax: [phone] Visits Status Start Date End Date 1 New Request 4/25/18 4/25/19 If your referral has a status of pending review or denied, additional information will be sent to support the outcome of this decision. Introducing [facility]! Dear Ravindra Stewart: Thank you for requesting a [employer] account. Our records indicate that you have previously registered for a [employer] account but its currently inactive. Please call our [employer] support line at [phone]. Additional Information If you have questions, please visit the Frequently Asked Questions section of the [employer] website at [email or web address] Remember, [employer] is NOT to be used for urgent needs. For medical emergencies, dial 911. Now available from your iPhone and Android! Please provide this summary of care documentation to your next provider. Your primary care clinician is listed as Kimberly Saenz. If you have any questions after today's visit, please call [phone].

## 2018-04-25 NOTE — PROGRESS NOTES
HISTORY OF PRESENT ILLNESS  Ricky Solomon is a 57 y.o. male. HPI Comments: Ricky Solomon is here for follow up on his HTN, however, he mentions the Prilosec for his gastroesophageal reflux disease didn't work, so he stopped it. Also, his rheumatologist left and he is out of medications. He has an appointment next month with the new doctor. Blood Pressure Check   The history is provided by the patient. This is a chronic problem. The current episode started more than 1 week ago. The problem occurs constantly. The problem has been gradually improving. Pertinent negatives include no chest pain, no abdominal pain, no headaches and no shortness of breath. Nothing aggravates the symptoms. The symptoms are relieved by medications. Treatments tried: Norvasc, Diovan Hct. The treatment provided significant relief. Review of Systems   Constitutional: Negative for weight loss. No weight gain   Eyes: Negative for blurred vision. Respiratory: Negative for shortness of breath. Cardiovascular: Negative for chest pain and leg swelling. Gastrointestinal: Positive for heartburn. Negative for abdominal pain, nausea and vomiting. Musculoskeletal: Negative for joint pain. Neurological: Negative for dizziness, sensory change, speech change, focal weakness and headaches. Visit Vitals    /81    Pulse (!) 59    Temp 98.4 °F (36.9 °C) (Oral)    Resp 18    Ht 5' 10\" (1.778 m)    Wt 215 lb (97.5 kg)    BMI 30.85 kg/m2     BP Readings from Last 3 Encounters:   04/25/18 123/81   09/18/17 136/87   05/11/17 146/88     Physical Exam   Constitutional: He is oriented to person, place, and time. He appears well-developed and well-nourished. No distress. Cardiovascular: Normal rate, regular rhythm and normal heart sounds. Exam reveals no gallop and no friction rub. No murmur heard. Pulmonary/Chest: Effort normal and breath sounds normal. No respiratory distress. He has no wheezes.  He has no rales.   Musculoskeletal: He exhibits no edema. Neurological: He is alert and oriented to person, place, and time. Skin: Skin is warm and dry. He is not diaphoretic. Nursing note and vitals reviewed. ASSESSMENT and PLAN    ICD-10-CM ICD-9-CM    1. Essential hypertension with goal blood pressure less than 140/90 V65 921.1 METABOLIC PANEL, COMPREHENSIVE   2. Gastroesophageal reflux disease, esophagitis presence not specified K21.9 530.81    3. Seropositive rheumatoid arthritis of multiple sites (HCC) M05.79 714.0 REFERRAL TO OPHTHALMOLOGY      CBC WITH AUTOMATED DIFF      METABOLIC PANEL, COMPREHENSIVE      SED RATE (ESR)      C REACTIVE PROTEIN, QT      REFERRAL TO RHEUMATOLOGY   4. Rheumatoid arthritis involving multiple sites, unspecified rheumatoid factor presence (HCC) M06.9 714.0 methotrexate (RHEUMATREX) 2.5 mg tablet   5. Glaucoma screening Z13.5 V80.1 REFERRAL TO OPHTHALMOLOGY   6. Encounter for FIT (fecal immunochemical test) screening Z12.11 V76.51 OCCULT BLOOD, IMMUNOASSAY (FIT)   7. Elevated cholesterol E78.00 272.0 NMR LIPOPROFILE      METABOLIC PANEL, COMPREHENSIVE        Blood pressure controlled  Refractory gastroesophageal reflux disease  Rheumatoid Arthritis needing follow up and refills  Continue current plans. Labs per orders. Gastroenterology referral  I have referred him to a rheumatologist closer by    Follow-up Disposition:  Return in about 6 months (around 10/25/2018) for blood pressure. Reviewed plan of care. Patient has provided input and agrees with goals.

## 2018-04-27 LAB
ALBUMIN SERPL-MCNC: 4 G/DL (ref 3.6–4.8)
ALBUMIN/GLOB SERPL: 1.2 {RATIO} (ref 1.2–2.2)
ALP SERPL-CCNC: 80 IU/L (ref 39–117)
ALT SERPL-CCNC: 13 IU/L (ref 0–44)
AST SERPL-CCNC: 17 IU/L (ref 0–40)
BASOPHILS # BLD AUTO: 0 X10E3/UL (ref 0–0.2)
BASOPHILS NFR BLD AUTO: 0 %
BILIRUB SERPL-MCNC: 0.4 MG/DL (ref 0–1.2)
BUN SERPL-MCNC: 16 MG/DL (ref 8–27)
BUN/CREAT SERPL: 12 (ref 10–24)
CALCIUM SERPL-MCNC: 9.6 MG/DL (ref 8.6–10.2)
CHLORIDE SERPL-SCNC: 98 MMOL/L (ref 96–106)
CHOLEST SERPL-MCNC: 206 MG/DL (ref 100–199)
CO2 SERPL-SCNC: 25 MMOL/L (ref 18–29)
CREAT SERPL-MCNC: 1.37 MG/DL (ref 0.76–1.27)
CRP SERPL-MCNC: 4.6 MG/L (ref 0–4.9)
EOSINOPHIL # BLD AUTO: 0.2 X10E3/UL (ref 0–0.4)
EOSINOPHIL NFR BLD AUTO: 3 %
ERYTHROCYTE [DISTWIDTH] IN BLOOD BY AUTOMATED COUNT: 16.9 % (ref 12.3–15.4)
ERYTHROCYTE [SEDIMENTATION RATE] IN BLOOD BY WESTERGREN METHOD: 14 MM/HR (ref 0–30)
GFR SERPLBLD CREATININE-BSD FMLA CKD-EPI: 54 ML/MIN/1.73
GFR SERPLBLD CREATININE-BSD FMLA CKD-EPI: 63 ML/MIN/1.73
GLOBULIN SER CALC-MCNC: 3.4 G/DL (ref 1.5–4.5)
GLUCOSE SERPL-MCNC: 101 MG/DL (ref 65–99)
HCT VFR BLD AUTO: 38.1 % (ref 37.5–51)
HDL SERPL-SCNC: 29.2 UMOL/L
HDLC SERPL-MCNC: 57 MG/DL
HGB BLD-MCNC: 12.9 G/DL (ref 13–17.7)
IMM GRANULOCYTES # BLD: 0 X10E3/UL (ref 0–0.1)
IMM GRANULOCYTES NFR BLD: 0 %
INTERPRETATION, 910389: NORMAL
INTERPRETATION: NORMAL
LDL SERPL QN: 21.6 NM
LDL SERPL-SCNC: 1242 NMOL/L
LDL SMALL SERPL-SCNC: 145 NMOL/L
LDLC SERPL CALC-MCNC: 135 MG/DL (ref 0–99)
LP-IR SCORE SERPL: <25
LYMPHOCYTES # BLD AUTO: 1.4 X10E3/UL (ref 0.7–3.1)
LYMPHOCYTES NFR BLD AUTO: 23 %
MCH RBC QN AUTO: 32.2 PG (ref 26.6–33)
MCHC RBC AUTO-ENTMCNC: 33.9 G/DL (ref 31.5–35.7)
MCV RBC AUTO: 95 FL (ref 79–97)
MONOCYTES # BLD AUTO: 0.4 X10E3/UL (ref 0.1–0.9)
MONOCYTES NFR BLD AUTO: 7 %
NEUTROPHILS # BLD AUTO: 3.8 X10E3/UL (ref 1.4–7)
NEUTROPHILS NFR BLD AUTO: 67 %
PDF IMAGE, 910387: NORMAL
PLATELET # BLD AUTO: 245 X10E3/UL (ref 150–379)
POTASSIUM SERPL-SCNC: 4.1 MMOL/L (ref 3.5–5.2)
PROT SERPL-MCNC: 7.4 G/DL (ref 6–8.5)
RBC # BLD AUTO: 4.01 X10E6/UL (ref 4.14–5.8)
SODIUM SERPL-SCNC: 139 MMOL/L (ref 134–144)
TRIGL SERPL-MCNC: 70 MG/DL (ref 0–149)
WBC # BLD AUTO: 5.8 X10E3/UL (ref 3.4–10.8)

## 2018-04-30 ENCOUNTER — TELEPHONE (OUTPATIENT)
Dept: FAMILY MEDICINE CLINIC | Age: 63
End: 2018-04-30

## 2018-04-30 NOTE — TELEPHONE ENCOUNTER
Called and spoke with pt, and he has been advised and states understanding of referral and was provided number.

## 2018-04-30 NOTE — TELEPHONE ENCOUNTER
----- Message from Luis Ortiz MD sent at 4/29/2018  6:39 PM EDT -----  I mentioned that I was sending him to Gastroenterology for her gastroesophageal reflux disease when he was in last week, but forgot to give him a referral request.  Please call him about this. I have printed one.

## 2018-05-07 ENCOUNTER — OFFICE VISIT (OUTPATIENT)
Dept: RHEUMATOLOGY | Age: 63
End: 2018-05-07

## 2018-05-07 VITALS
DIASTOLIC BLOOD PRESSURE: 75 MMHG | WEIGHT: 210 LBS | RESPIRATION RATE: 18 BRPM | SYSTOLIC BLOOD PRESSURE: 118 MMHG | HEIGHT: 70 IN | TEMPERATURE: 97.5 F | BODY MASS INDEX: 30.06 KG/M2 | HEART RATE: 58 BPM

## 2018-05-07 DIAGNOSIS — M17.0 PRIMARY OSTEOARTHRITIS OF BOTH KNEES: ICD-10-CM

## 2018-05-07 DIAGNOSIS — Z79.60 LONG-TERM USE OF IMMUNOSUPPRESSANT MEDICATION: ICD-10-CM

## 2018-05-07 DIAGNOSIS — M06.09 SERONEGATIVE RHEUMATOID ARTHRITIS OF MULTIPLE SITES (HCC): Primary | ICD-10-CM

## 2018-05-07 DIAGNOSIS — N18.2 CKD (CHRONIC KIDNEY DISEASE) STAGE 2, GFR 60-89 ML/MIN: ICD-10-CM

## 2018-05-07 RX ORDER — METHOTREXATE 2.5 MG/1
15 TABLET ORAL
Qty: 72 TAB | Refills: 0 | Status: SHIPPED | OUTPATIENT
Start: 2018-05-08 | End: 2018-09-26 | Stop reason: SDUPTHER

## 2018-05-07 RX ORDER — FOLIC ACID 1 MG/1
TABLET ORAL DAILY
COMMUNITY
End: 2018-05-07 | Stop reason: SDUPTHER

## 2018-05-07 RX ORDER — FOLIC ACID 1 MG/1
1 TABLET ORAL DAILY
Qty: 90 TAB | Refills: 0 | Status: SHIPPED | OUTPATIENT
Start: 2018-05-07 | End: 2018-09-12 | Stop reason: SDUPTHER

## 2018-05-07 NOTE — PROGRESS NOTES
REASON FOR VISIT    This is a Dr. Lizz Paredes follow-up visit for Mr. Saadia Short for Seropositive Rheumatoid Arthritis. Inflammatory arthritis phenotype includes:  Anti-CCP positive: pending  Rheumatoid factor positive: yes (179.6)  Erosive disease: no  Extra-articular manifestations include: iriitis    Immunosuppression Screening (N/A):  Quantiferon TB: pending  PPD:  Not performed  Hepatitis B: pending  Hepatitis C: negative (9/22/2016)    Therapy History includes:  Current DMARD therapy include: methotrexate 10 mg every Tuesday  Prior DMARD therapy include: none  Discontinued DMARDs because of inefficacy: None  Discontinued DMARDs because of side effects: None    Immunization History   Administered Date(s) Administered    Influenza Vaccine 10/07/2013    ZZZ-RETIRED (DO NOT USE) Pneumococcal Vaccine (Unspecified Type) 10/10/2007       Patient Active Problem List   Diagnosis Code    Seropositive [facility] of [facility] ([facility] M05.79    Eczema L30.9    Elevated cholesterol E78.00    BPH (benign prostatic hypertrophy) N40.0    History of renal cell cancer Z85.528    Essential hypertension with goal blood pressure less than 140/90 I10    Iritis H20.9    Long-term use of immunosuppressant medication Z79.899    GERD (gastroesophageal reflux disease) K21.9    Seronegative rheumatoid arthritis of multiple sites ([facility]) M06.09    CKD (chronic kidney disease) stage 2, GFR 60-89 ml/min N18.2       HISTORY OF PRESENT ILLNESS    Mr. Saadia Short returns for a follow-up. I reviewed his medical record, including Dr. Irene Montgomery office notes, laboratories and imaging and summarized them in this note. On his visit with Dr. Lizz Paredes on 5/11/2017 \"for follow up of rheumatoid arthritis. This is his first visit in over one year. Overall, he has been doing okay. He may note a flare of pain every 2-3 months that will last one day. He has some pain in the knees. He notes no joint swelling.  AM stiffness of a few minutes is noted. He has no fevers or weight loss. He has been taking methotrexate 10 mg weekly. He takes Aleve 2 tabs daily only if needed (taken today as out of medication). He still tries to play tennis 3 days weekly. Of note, he relates that he became a grandfather a few months ago. \" Under assessment, he wrote \"Seropositive. Has been doing well on methotrexate 10 mg weekly. He is out of medication this week and has noted a mild flare. Generally, he does not take Aleve or NSAIDS. \"    He is on methotrexate 10 mg every Tuesday. He informs me he had two episodes of iritis which were treated with topicals and no recurrence. His Rheumatoid Arthritis initiall involved his knees but then spread. He has about 4-5 flares a years. He denies pain, swelling, or stiffness in joints. He plays tennis 3 days a week. He denies fever, weight loss, blurred vision, vision loss, oral ulcers, ankle swelling, dry cough, dyspnea, nausea, vomiting, dysphagia, abdominal pain, black or bloody stool, fall since last visit, rash, easy bruising and increased thirst.    Mr. Lynnette Gonzalez has continued his medications for arthritis and reports good tolerance without significant side effects. Last toxicity monitoring by blood work was done on 4/25/2018 and did not reveal any significant adverse effects, except creatinine 1.37, eGFR 63. Most recent inflammatory markers from 4/25/2018 revealed a ESR 14 mm/hr (previously 18, 12 mm/hr) and CRP 4.6 mg/L (previously N/A mg/L). The patient has not had any interval hospital admissions, infections, or surgeries. REVIEW OF SYSTEMS    A comprehensive review of systems was performed and pertinent results are documented in the HPI, review of systems is otherwise non-contributory. PAST MEDICAL HISTORY    He has a past medical history of BPH (benign prostatic hypertrophy) (11/19/2012); Eczema (11/19/2012); Elevated cholesterol (11/19/2012); GERD (gastroesophageal reflux disease) (11/11/2016);  History of renal cell cancer (11/19/2012); HTN (hypertension) (11/19/2012); Iritis (11/19/2012); RA (rheumatoid arthritis) ([facility] (11/19/2012); and Renal cell cancer ([facility] (11/19/2012). FAMILY HISTORY    His family history includes Anemia in his mother; Arthritis-rheumatoid in his sister; Stroke in his maternal grandmother. SOCIAL HISTORY    He reports that he has never smoked. He has never used smokeless tobacco. He reports that he drinks about 4.8 - 5.4 oz of alcohol per week  He reports that he does not use illicit drugs. MEDICATIONS    Current Outpatient Prescriptions   Medication Sig Dispense Refill    [START ON 5/8/2018] methotrexate (RHEUMATREX) 2.5 mg tablet Take 6 Tabs by mouth every Tuesday for 90 days. 72 Tab 0    folic acid (FOLVITE) 1 mg tablet Take 1 Tab by mouth daily for 90 days. 90 Tab 0    valsartan-hydroCHLOROthiazide (DIOVAN-HCT) 320-25 mg per tablet TAKE 1 TAB BY MOUTH DAILY for hypertension. 30 Tab 11    amLODIPine (NORVASC) 5 mg tablet Take 1 Tab by mouth daily. For hypertension 30 Tab 11    Blood Pressure Monitor (BLOOD PRESSURE KIT) kit Check blood pressure twice daily and record values. Goal is less than 140/90. 1 Kit 0        ALLERGIES    Allergies   Allergen Reactions    Codeine Other (comments)     Talking out of head       PHYSICAL EXAMINATION    Visit Vitals    /75    Pulse (!) 58    Temp 97.5 °F (36.4 °C)    Resp 18    Ht 5' 10\" (1.778 m)    Wt 210 lb (95.3 kg)    BMI 30.13 kg/m2     Body mass index is 30.13 kg/(m^2). General: Patient is alert, oriented x 3, not in acute distress    HEENT:   Sclerae are not injected and appear moist.  Oral mucous membranes are moist, there are no ulcers present. There is no alopecia. Neck is supple     Cardiovascular:  Heart is regular rate and rhythm, no murmurs. Chest:  Lungs are clear to auscultation bilaterally. No rhonchi, wheezes, or crackles.     Extremities:  Free of clubbing, cyanosis, edema    Neurological exam:  No focal sensory deficits, muscle strength is full in upper and lower extremities     Skin exam:    Psoriasis:     no  Nail Pitting:     no  Onycholysis:     no  Palmoplantar pustulosis:   no  Acne fulminans:    no  Acne conglobata:    no  Hidradenitis Suppurativa:   no  Dissecting cellulitis of the scalp:  no  Pilonidal sinus:    no  Erythema nodosum:    no  Non-Scarring Alopecia:  no  Discoid Lupus:   no  Subacute Cutaneous Lupus:   no  Heliotrope Rash:   no  Upper Arm Erythema:   no  Shawl Sign:    no  V-sign:     no  Holster sign:    no  Gottron's papules:   no  Gottron's sign:    no  Calcinosis:    no  Raynaud's Phenomenon:  no  's Hands:   no  Periungual erythema:   no  Abnormal Nailfold Capillaries: no  Livedo Reticularis:   no  Scalp Erythema:   no  Rheumatoid Nodules:   No    Musculoskeletal:  A comprehensive musculoskeletal exam was performed for all joints of each upper and lower extremity and assessed for swelling, tenderness and range of motion. Decreased ROM of right elbow with flexion deformity  Bilateral knee crepitus without effusion    Z-Deformities:   no  Conner Neck Deformities:  no  Boutonierre's Deformities:  no  Ulnar Deviation:   no  MCP Subluxation:  no    Joint Count 5/7/2018 5/11/2017   Patient pain (0-100) 40 -   MHAQ 0.375 -   Right elbow - Tender 0 -   Right elbow - Swollen 1 -   Tender Joint Count (Total) 0 -   Swollen Joint Count (Total) 1 -   Physician Assessment (0-10) 1 3   Patient Assessment (0-10) 3 2   CDAI Total (calculated) 5 -   CDAI - 5     DATA REVIEW    Laboratory     Recent laboratory results were reviewed, summarized, and discussed with the patient. Imaging    Musculoskeletal Ultrasound    None    Radiographs    Chest 9/22/2016: The cardiopericardial silhouette is within normal limits. There is no pleural effusion, pneumothorax or focal consolidation present. Bilateral Hand 3/15/2013: LEFT: Density appears normal. No articular erosions are seen.  No abnormal soft tissue swelling is seen. RIGHT: Bone density is normal. No joint erosions are seen. There are minor osteoarthritic changes at the first carpometacarpal junction. No abnormal soft tissue swelling is seen    CT Imaging    None    MR Imaging    None    DXA     None    ASSESSMENT AND PLAN    This is a follow-up visit for Mr. Kwan Schuster. 1) Seropositive Rheumatoid Arthritis. This is chronic and longstanding. He has right elbow flexion deformity with synovitis. He notes intermittent episodes of flares up to 4-5 times per year, such as in his wrists. He has been maintained on methotrexate 10 mg every Tuesday with good tolerance. He also notes a history of two episodes of iritis. He has not been taking folic acid. His CDAI was 5 with 0 tender and 1 swollen, consistent with low disease activity. I ordered a right elbow radiograph and increased methotrexate to 15 mg weekly. He has chronic kidney disease which worsened from 5/11/2017, which may have been from an increase in his ARB. 2) Long Term Use of Immunosuppressants. The patient remains on immunomodulatory medications (methotrexate) and requires frequent toxicity monitoring by blood work. Respective labs were ordered (CBC and CMP). 3) Chronic Kidney Disease Stage 2. The patient's creatinine 1.37 mg/dL and eGFR 63 (previously creatinine 1/18 mg/dL and eGFR 75). He is on Valsartan/HCTZ, with an increased dose of the ARB which may have been responsible for the decreased in eGFR. He avoids NSAIDs. 4) History of Renal Cancer. This was in 2008 and he is on remission. The patient voiced understanding of the aforementioned assessment and plan. Summary of plan was provided in the After Visit Summary patient instructions.      TODAY'S ORDERS    Orders Placed This Encounter    XR ELBOW RT MIN 3 V    methotrexate (RHEUMATREX) 2.5 mg tablet    folic acid (FOLVITE) 1 mg tablet     Future Appointments  Date Time Provider Favio Montes   8/7/2018 2:00 PM Aminah Mcmullen Jaime Osgood, MD [address]   10/25/2018 9:00 AM Shanel Osei MD, [address] [facility]    Adult Rheumatology   Musculoskeletal Ultrasound Certified  [address] [facility], [facility], [address]   Phone [phone]  Fax [phone]

## 2018-05-07 NOTE — MR AVS SNAPSHOT
[address] [facility] 350 [facility] 
[phone] Patient: Ryan Martinez MRN: [id number] Formerly [facility], 1928–1983:[birth year]01 Visit Information Date & Time Provider Department Dept. Phone Encounter #  
 5/7/2018  4:00 PM Odessa WildAnthony [id number] Follow-up Instructions Return in about 3 months (around 8/7/2018). Your Appointments 10/25/2018  9:00 AM  
ROUTINE CARE with Haris Reynolds MD  
[address] [facility] Appt Note: 6 month follow up HTN  
 320 [facility] 1007 [facility]  
[phone] [address] [facility] [address] [address] Upcoming Health Maintenance Date Due  
 GLAUCOMA SCREENING Q2Y 6/12/2017 FOBT Q 1 YEAR AGE 50-75 9/22/2017 Influenza Age 5 to Adult 8/1/2018 DTaP/Tdap/Td series (2 - Td) 9/22/2026 Allergies as of 5/7/2018  Review Complete On: 5/7/2018 By: Odessa Wild MD  
  
 Severity Noted Reaction Type Reactions Codeine  03/15/2013    Other (comments) Talking out of head Current Immunizations  Reviewed on 5/11/2017 Name Date Influenza Vaccine 10/7/2013 ZZZ-RETIRED (DO NOT USE) Pneumococcal Vaccine (Unspecified Type) 10/10/2007 Not reviewed this visit You Were Diagnosed With   
  
 Codes Comments Seronegative rheumatoid arthritis of multiple sites [facility])    -  Primary ICD-10-CM: M06.09 
ICD-9-CM: 714.0 Long-term use of immunosuppressant medication     ICD-10-CM: Z79.899 ICD-9-CM: V58.69 CKD (chronic kidney disease) stage 2, GFR 60-89 ml/min     ICD-10-CM: N18.2 ICD-9-CM: 057. 2 Vitals BP Pulse Temp Resp Height(growth percentile) Weight(growth percentile) 118/75 (!) 58 97.5 °F (36.4 °C) 18 5' 10\" (1.778 m) 210 lb (95.3 kg) BMI Smoking Status 30.13 kg/m2 Never Smoker BMI and BSA Data Body Mass Index Body Surface Area  
 30.13 kg/m 2 2.17 m 2 Preferred Pharmacy Pharmacy Name Phone [facility]/PHARMACY #[id number]- [place], [address] [place] [address] Road [phone] Your Updated Medication List  
  
   
This list is accurate as of 5/7/18  4:32 PM.  Always use your most recent med list. amLODIPine 5 mg tablet Commonly known as:  Arlington Cordial Take 1 Tab by mouth daily. For hypertension Blood Pressure Monitor Kit Commonly known as:  BLOOD PRESSURE KIT Check blood pressure twice daily and record values. Goal is less than 140/90. folic acid 1 mg tablet Commonly known as:  Carter Take 1 Tab by mouth daily for 90 days. methotrexate 2.5 mg tablet Commonly known as:  Dipesh Madera Take 6 Tabs by mouth every Tuesday for 90 days. Start taking on:  5/8/2018  
  
 valsartan-hydroCHLOROthiazide 320-25 mg per tablet Commonly known as:  DIOVAN-HCT  
TAKE 1 TAB BY MOUTH DAILY for hypertension. Prescriptions Sent to Pharmacy Refills  
 methotrexate (RHEUMATREX) 2.5 mg tablet 0 Starting on: 5/8/2018 Sig: Take 6 Tabs by mouth every Tuesday for 90 days. Class: Normal  
 Pharmacy: [facility]/pharmacy #[id number] Ph #: [phone] Route: Oral  
 folic acid (FOLVITE) 1 mg tablet 0 Sig: Take 1 Tab by mouth daily for 90 days. Class: Normal  
 Pharmacy: [facility]/pharmacy #[id number], [address] [place] [address] Ph #: [phone] Route: Oral  
  
Follow-up Instructions Return in about 3 months (around 8/7/2018). Introducing [facility] & HEALTH SERVICES! [employer] introduces [employer] patient portal. Now you can access parts of your medical record, email your doctor's office, and request medication refills online. 1. In your internet browser, go to [email or web address] Home Monitoring 2. Click on the First Time User? Click Here link in the Sign In box. You will see the New Member Sign Up page. 3. Enter your Inherited Health Access Code exactly as it appears below. You will not need to use this code after youve completed the sign-up process. If you do not sign up before the expiration date, you must request a new code. · Inherited Health Access Code: [email or web address] Expires: 7/28/2018  6:38 PM 
 
4. Enter the last four digits of your Social Security Number (xxxx) and Date of Birth (mm/dd/yyyy) as indicated and click Submit. You will be taken to the next sign-up page. 5. Create a Inherited Health ID. This will be your Inherited Health login ID and cannot be changed, so think of one that is secure and easy to remember. 6. Create a Inherited Health password. You can change your password at any time. 7. Enter your Password Reset Question and Answer. This can be used at a later time if you forget your password. 8. Enter your e-mail address. You will receive e-mail notification when new information is available in [address] 9. Click Sign Up. You can now view and download portions of your medical record. 10. Click the Download Summary menu link to download a portable copy of your medical information. If you have questions, please visit the Frequently Asked Questions section of the Inherited Health website. Remember, Inherited Health is NOT to be used for urgent needs. For medical emergencies, dial 911. Now available from your iPhone and Android! Please provide this summary of care documentation to your next provider. Your primary care clinician is listed as Astrid Tomlin. If you have any questions after today's visit, please call [phone].

## 2018-06-27 DIAGNOSIS — M06.9 RHEUMATOID ARTHRITIS INVOLVING MULTIPLE SITES, UNSPECIFIED RHEUMATOID FACTOR PRESENCE: ICD-10-CM

## 2018-07-01 RX ORDER — METHOTREXATE 2.5 MG/1
TABLET ORAL
Qty: 20 TAB | Refills: 0 | OUTPATIENT
Start: 2018-07-01

## 2018-07-18 ENCOUNTER — TELEPHONE (OUTPATIENT)
Dept: FAMILY MEDICINE CLINIC | Age: 63
End: 2018-07-18

## 2018-07-18 DIAGNOSIS — I10 ESSENTIAL HYPERTENSION WITH GOAL BLOOD PRESSURE LESS THAN 140/90: Primary | ICD-10-CM

## 2018-07-18 RX ORDER — LOSARTAN POTASSIUM AND HYDROCHLOROTHIAZIDE 25; 100 MG/1; MG/1
1 TABLET ORAL DAILY
Qty: 30 TAB | Refills: 1 | Status: SHIPPED | OUTPATIENT
Start: 2018-07-18 | End: 2018-08-07

## 2018-07-18 NOTE — TELEPHONE ENCOUNTER
Pt called stating he saw commercial regarding recall on Valsartan, is on this medication and wants call back to advise what Dr. Dilia Mendoza wants him to do.  Ldm

## 2018-07-19 NOTE — TELEPHONE ENCOUNTER
Called and spoke with pt, and he has been advised and states understanding of medication change. Pt has been scheduled for 09/11/2018.

## 2018-07-19 NOTE — TELEPHONE ENCOUNTER
Please let him know I am switching him to losartan hct and have sent it to his pharmacy. He needs to see me in 6 weeks for a blood pressure check.

## 2018-08-07 ENCOUNTER — OFFICE VISIT (OUTPATIENT)
Dept: RHEUMATOLOGY | Age: 63
End: 2018-08-07

## 2018-08-07 VITALS
TEMPERATURE: 97.9 F | HEIGHT: 70 IN | BODY MASS INDEX: 30.06 KG/M2 | RESPIRATION RATE: 16 BRPM | DIASTOLIC BLOOD PRESSURE: 79 MMHG | HEART RATE: 63 BPM | OXYGEN SATURATION: 97 % | SYSTOLIC BLOOD PRESSURE: 129 MMHG | WEIGHT: 210 LBS

## 2018-08-07 DIAGNOSIS — Z79.60 LONG-TERM USE OF IMMUNOSUPPRESSANT MEDICATION: ICD-10-CM

## 2018-08-07 DIAGNOSIS — M05.79 SEROPOSITIVE RHEUMATOID ARTHRITIS OF MULTIPLE SITES (HCC): Primary | ICD-10-CM

## 2018-08-07 RX ORDER — METHOTREXATE 2.5 MG/1
2.5 TABLET ORAL
COMMUNITY
End: 2019-03-01 | Stop reason: SDUPTHER

## 2018-08-07 NOTE — MR AVS SNAPSHOT
[address] [facility] [address] [place] [address] 
[phone] Patient: Nawaf Morales MRN: [id number] FJY:[birth year] Visit Information Date & Time Provider Department Dept. Phone Encounter #  
 8/7/2018  2:00 PM Gardenia FernandezAnthony [id number] Follow-up Instructions Return in about 3 months (around 11/7/2018). Your Appointments 9/11/2018  2:15 PM  
ROUTINE CARE with Wenceslao Mckeon MD  
[address] [facility]) Appt Note: 6 week HTN follow-medication was changed. 320 [facility] [address]  
[phone]  
  
   
 [address] [place] [address]  
  
    
 10/25/2018  9:00 AM  
ROUTINE CARE with Wenceslao Mckeon MD  
[address] [facility]) Appt Note: 6 month follow up HTN  
 [address]  
[phone] Upcoming Health Maintenance Date Due FOBT Q 1 YEAR AGE 50-75 9/22/2017 GLAUCOMA SCREENING Q2Y 7/12/2018 Pneumococcal 65+ Low/Medium Risk (2 of 2 - PPSV23) 7/12/2018 Influenza Age 5 to Adult 8/1/2018 DTaP/Tdap/Td series (2 - Td) 9/22/2026 Allergies as of 8/7/2018  Review Complete On: 8/7/2018 By: Gardenia Fernandez MD  
  
 Severity Noted Reaction Type Reactions Codeine  03/15/2013    Other (comments) Talking out of head Current Immunizations  Reviewed on 5/11/2017 Name Date Influenza Vaccine 10/7/2013 ZZZ-RETIRED (DO NOT USE) Pneumococcal Vaccine (Unspecified Type) 10/10/2007 Not reviewed this visit You Were Diagnosed With   
  
 Codes Comments Seropositive rheumatoid arthritis of multiple sites [facility])    -  Primary ICD-10-CM: M05.79 ICD-9-CM: 714.0 Long-term use of immunosuppressant medication     ICD-10-CM: Z79.899 ICD-9-CM: V58.69 Vitals BP Pulse Temp Resp Height(growth percentile) Weight(growth percentile) 129/79 (BP 1 Location: Left arm, BP Patient Position: Sitting) 63 97.9 °F (36.6 °C) (Oral) 16 5' 10\" (1.778 m) 210 lb (95.3 kg) SpO2 BMI Smoking Status 97% 30.13 kg/m2 Never Smoker Vitals History BMI and BSA Data Body Mass Index Body Surface Area  
 30.13 kg/m 2 2.17 m 2 Preferred Pharmacy Pharmacy Name Phone [facility]/PHARMACY #[id number] Cano, [address] [facility] [phone] Your Updated Medication List  
  
   
This list is accurate as of 8/7/18  2:34 PM.  Always use your most recent med list. amLODIPine 5 mg tablet Commonly known as:  Natasha Downs Take 1 Tab by mouth daily. For hypertension Blood Pressure Monitor Kit Commonly known as:  BLOOD PRESSURE KIT Check blood pressure twice daily and record values. Goal is less than 140/90. methotrexate 2.5 mg tablet Commonly known as:  Chasity Diaz Take 2.5 mg by mouth every Tuesday. Indications: take 6 tabs every tuesday Follow-up Instructions Return in about 3 months (around 11/7/2018). To-Do List   
 08/07/2018 Lab:  C REACTIVE PROTEIN, QT   
  
 08/07/2018 Lab:  CBC WITH AUTOMATED DIFF   
  
 08/07/2018 Lab:  CHRONIC HEPATITIS PANEL   
  
 08/07/2018 Lab:  CYCLIC CITRUL PEPTIDE AB, IGG   
  
 08/07/2018 Lab:  METABOLIC PANEL, COMPREHENSIVE   
  
 08/07/2018 Lab:  PROTEIN ELECTROPHORESIS W/ REFLX ESTEFANIA   
  
 08/07/2018 Microbiology:  QUANTIFERON TB GOLD   
  
 08/07/2018 Lab:  SED RATE (ESR) Introducing [facility] & HEALTH SERVICES! Timmy Spaulding introduces [employer] patient portal. Now you can access parts of your medical record, email your doctor's office, and request medication refills online. 1. In your internet browser, go to [email or web address] ([email or web address])/[employer] 2. Click on the First Time User? Click Here link in the Sign In box. You will see the New Member Sign Up page. 3. Enter your [employer] Access Code exactly as it appears below. You will not need to use this code after youve completed the sign-up process. If you do not sign up before the expiration date, you must request a new code. · [employer] Access Code: [email or web address] Expires: 11/5/2018  2:34 PM 
 
4. Enter the last four digits of your Social Security Number (xxxx) and Date of Birth (mm/dd/yyyy) as indicated and click Submit. You will be taken to the next sign-up page. 5. Create a [employer] ID. This will be your [employer] login ID and cannot be changed, so think of one that is secure and easy to remember. 6. Create a [employer] password. You can change your password at any time. 7. Enter your Password Reset Question and Answer. This can be used at a later time if you forget your password. 8. Enter your e-mail address. You will receive e-mail notification when new information is available in [address] 9. Click Sign Up. You can now view and download portions of your medical record. 10. Click the Download Summary menu link to download a portable copy of your medical information. If you have questions, please visit the Frequently Asked Questions section of the [employer] website. Remember, [employer] is NOT to be used for urgent needs. For medical emergencies, dial 911. Now available from your iPhone and Android! Please provide this summary of care documentation to your next provider. Your primary care clinician is listed as Leandra Browne. If you have any questions after today's visit, please call [phone].

## 2018-08-07 NOTE — PROGRESS NOTES
REASON FOR VISIT    This is a follow-up visit for Mr. Itz Argueta for Seropositive Rheumatoid Arthritis. Inflammatory arthritis phenotype includes:  Anti-CCP positive: pending  Rheumatoid factor positive: yes (179.6)  Erosive disease: no  Extra-articular manifestations include: iritis    Immunosuppression Screening (N/A):  Quantiferon TB: pending  PPD:  Not performed  Hepatitis B: pending  Hepatitis C: negative (9/22/2016)    Therapy History includes:  Current DMARD therapy include: methotrexate 15 mg every Tuesday  Prior DMARD therapy include: none  Discontinued DMARDs because of inefficacy: None  Discontinued DMARDs because of side effects: None    Immunization History   Administered Date(s) Administered    Influenza Vaccine 10/07/2013    ZZZ-RETIRED (DO NOT USE) Pneumococcal Vaccine (Unspecified Type) 10/10/2007       Patient Active Problem List   Diagnosis Code    Seropositive rheumatoid arthritis of multiple sites ([facility] M05.79    Eczema L30.9    Elevated cholesterol E78.00    BPH (benign prostatic hypertrophy) N40.0    History of renal cell cancer Z85.528    Essential hypertension with goal blood pressure less than 140/90 I10    Iritis H20.9    Long-term use of immunosuppressant medication Z79.899    GERD (gastroesophageal reflux disease) K21.9    Seronegative rheumatoid arthritis of multiple sites (HCC) M06.09    CKD (chronic kidney disease) stage 2, GFR 60-89 ml/min N18.2    Primary osteoarthritis of both knees M17.0       HISTORY OF PRESENT ILLNESS    Mr. Itz Argueta returns for a follow-up. On his last visit, I ordered a right elbow radiograph and increased methotrexate to 15 mg weekly with good tolerance. Today, he denies pain, swelling, or stiffness in his elbow. He plays tennis 3 days a week and at times has pain in his right shoulder and right knee that improves with movement. He denies interval iritis.      Mr. Itz Argueta has continued his medications for arthritis and reports good tolerance without significant side effects. Last toxicity monitoring by blood work was done on 4/25/2018 and did not reveal any significant adverse effects, except creatinine 1.37, eGFR 63. Most recent inflammatory markers from 4/25/2018 revealed a ESR 14 mm/hr (previously 18, 12 mm/hr) and CRP 4.6 mg/L (previously N/A mg/L). The patient has not had any interval hospital admissions, infections, or surgeries. REVIEW OF SYSTEMS    A comprehensive review of systems was performed and pertinent results are documented in the HPI, review of systems is otherwise non-contributory. PAST MEDICAL HISTORY    He has a past medical history of BPH (benign prostatic hypertrophy) (11/19/2012); Eczema (11/19/2012); Elevated cholesterol (11/19/2012); GERD (gastroesophageal reflux disease) (11/11/2016); History of renal cell cancer (11/19/2012); HTN (hypertension) (11/19/2012); Iritis (11/19/2012); RA (rheumatoid arthritis) ([facility] (11/19/2012); and Renal cell cancer ([facility] (11/19/2012). FAMILY HISTORY    His family history includes Anemia in his mother; Arthritis-rheumatoid in his sister; Stroke in his maternal grandmother. SOCIAL HISTORY    He reports that he has never smoked. He has never used smokeless tobacco. He reports that he drinks about 4.8 - 5.4 oz of alcohol per week  He reports that he does not use illicit drugs. MEDICATIONS    Current Outpatient Prescriptions   Medication Sig Dispense Refill    methotrexate (RHEUMATREX) 2.5 mg tablet Take 2.5 mg by mouth every Tuesday. Indications: take 6 tabs every tuesday      amLODIPine (NORVASC) 5 mg tablet Take 1 Tab by mouth daily. For hypertension 30 Tab 11    Blood Pressure Monitor (BLOOD PRESSURE KIT) kit Check blood pressure twice daily and record values.   Goal is less than 140/90. 1 Kit 0        ALLERGIES    Allergies   Allergen Reactions    Codeine Other (comments)     Talking out of head       PHYSICAL EXAMINATION    Visit Vitals    /79 (BP 1 Location: Left arm, BP Patient Position: Sitting)    Pulse 63    Temp 97.9 °F (36.6 °C) (Oral)    Resp 16    Ht 5' 10\" (1.778 m)    Wt 210 lb (95.3 kg)    SpO2 97%    BMI 30.13 kg/m2     Body mass index is 30.13 kg/(m^2). General: Patient is alert, oriented x 3, not in acute distress    HEENT:   Sclerae are not injected and appear moist.  Oral mucous membranes are moist, there are no ulcers present. There is no alopecia. Neck is supple     Cardiovascular:  Heart is regular rate and rhythm, no murmurs. Chest:  Lungs are clear to auscultation bilaterally. No rhonchi, wheezes, or crackles. Extremities:  Free of clubbing, cyanosis, edema    Neurological exam:  No focal sensory deficits, muscle strength is full in upper and lower extremities     Skin exam:    Psoriasis:     no  Nail Pitting:     no  Onycholysis:     no  Palmoplantar pustulosis:   no  Acne fulminans:    no  Acne conglobata:    no  Hidradenitis Suppurativa:   no  Dissecting cellulitis of the scalp:  no  Pilonidal sinus:    no  Erythema nodosum:    no  Non-Scarring Alopecia:  no  Discoid Lupus:   no  Subacute Cutaneous Lupus:   no  Heliotrope Rash:   no  Upper Arm Erythema:   no  Shawl Sign:    no  V-sign:     no  Holster sign:    no  Gottron's papules:   no  Gottron's sign:    no  Calcinosis:    no  Raynaud's Phenomenon:  no  's Hands:   no  Periungual erythema:   no  Abnormal Nailfold Capillaries: no  Livedo Reticularis:   no  Scalp Erythema:   no  Rheumatoid Nodules:   No    Musculoskeletal:  A comprehensive musculoskeletal exam was performed for all joints of each upper and lower extremity and assessed for swelling, tenderness and range of motion.       Decreased ROM of right elbow with flexion deformity  Bilateral knee crepitus without effusion    Z-Deformities:   no  [place] Neck Deformities:  no  Boutonierre's Deformities:  no  Ulnar Deviation:   no  MCP Subluxation:  no    Joint Count 8/7/2018 5/7/2018 5/11/2017   Patient pain (0-100) 20 40 -   MHAQ 0.125 0.375 -   Left elbow - Tender 0 - -   Left elbow - Swollen 1 - -   Right elbow - Tender - 0 -   Right elbow - Swollen - 1 -   Tender Joint Count (Total) 0 0 -   Swollen Joint Count (Total) 1 1 -   Physician Assessment (0-10) 0 1 3   Patient Assessment (0-10) 2 3 2   CDAI Total (calculated) 3 5 -   CDAI - - 5     DATA REVIEW    Laboratory     Recent laboratory results were reviewed, summarized, and discussed with the patient. Imaging    Musculoskeletal Ultrasound    None    Radiographs    Right Elbow 5/07/2018: no fracture, dislocation, effusion or other acute abnormality. Moderate joint space narrowing, mild spurring of the olecranon process. No bony erosion, no fracture. The bone is normal in mineral content. No joint effusion    Chest 9/22/2016: The cardiopericardial silhouette is within normal limits. There is no pleural effusion, pneumothorax or focal consolidation present. Bilateral Hand 3/15/2013: LEFT: Density appears normal. No articular erosions are seen. No abnormal soft tissue swelling is seen. RIGHT: Bone density is normal. No joint erosions are seen. There are minor osteoarthritic changes at the first carpometacarpal junction. No abnormal soft tissue swelling is seen    CT Imaging    None    MR Imaging    None    DXA     None    ASSESSMENT AND PLAN    This is a follow-up visit for Mr. Citlaly Mckee. 1) Seropositive Rheumatoid Arthritis with Iritis. This is chronic and longstanding. He has right elbow flexion deformity with synovitis. He notes intermittent episodes of flares up to 4-5 times per year, such as in his wrists. He is been maintained on methotrexate 15 mg every Tuesday with good tolerance. He also notes a history of two episodes of iritis without recurrence. He has not been taking folic acid. His CDAI was 3 (previously 5) with 0 tender and 1 swollen, consistent with low disease activity. 2) Long Term Use of Immunosuppressants.  The patient remains on immunomodulatory medications (methotrexate) and requires frequent toxicity monitoring by blood work. Respective labs were ordered (CBC and CMP), but he was in a hurry and could not draw them today. I provided him with future lab orders. 3) Chronic Kidney Disease Stage 2. The patient's creatinine 1.37 mg/dL and eGFR 63 (previously creatinine 1.18 mg/dL and eGFR 75). He avoids NSAIDs. 4) History of Renal Cancer. This was in 2008 and he is on remission. The patient voiced understanding of the aforementioned assessment and plan. Summary of plan was provided in the After Visit Summary patient instructions.      TODAY'S ORDERS    Orders Placed This Encounter    QUANTIFERON TB GOLD    CYCLIC CITRUL PEPTIDE AB, IGG    CBC WITH AUTOMATED DIFF    CHRONIC HEPATITIS PANEL    METABOLIC PANEL, COMPREHENSIVE    C REACTIVE PROTEIN, QT    SED RATE (ESR)    PROTEIN ELECTROPHORESIS W/ REFLX ESTEFANIA    methotrexate (RHEUMATREX) 2.5 mg tablet     Future Appointments  Date Time Provider Department Center   9/11/2018 2:15 PM Mynor Quinones MD [address] [place] [address]   10/25/2018 9:00 AM Mynor Quinones MD [facility] KITTY SCHED   11/5/2018 2:40 PM MD Yenny Toledo Aas, MD, [address] [facility]    Adult Rheumatology   Musculoskeletal Ultrasound Certified  [facility], [address] [place] [address] Road   Phone [phone]  Fax [phone]

## 2018-08-22 ENCOUNTER — OFFICE VISIT (OUTPATIENT)
Dept: FAMILY MEDICINE CLINIC | Age: 63
End: 2018-08-22

## 2018-08-22 VITALS
HEART RATE: 60 BPM | RESPIRATION RATE: 18 BRPM | BODY MASS INDEX: 30.49 KG/M2 | TEMPERATURE: 97.9 F | WEIGHT: 213 LBS | DIASTOLIC BLOOD PRESSURE: 85 MMHG | HEIGHT: 70 IN | SYSTOLIC BLOOD PRESSURE: 154 MMHG

## 2018-08-22 DIAGNOSIS — Z12.11 ENCOUNTER FOR FIT (FECAL IMMUNOCHEMICAL TEST) SCREENING: ICD-10-CM

## 2018-08-22 DIAGNOSIS — I10 ESSENTIAL HYPERTENSION WITH GOAL BLOOD PRESSURE LESS THAN 140/90: ICD-10-CM

## 2018-08-22 DIAGNOSIS — Z23 ENCOUNTER FOR IMMUNIZATION: ICD-10-CM

## 2018-08-22 DIAGNOSIS — L30.9 ECZEMA, UNSPECIFIED TYPE: Primary | ICD-10-CM

## 2018-08-22 RX ORDER — BETAMETHASONE DIPROPIONATE 0.5 MG/G
OINTMENT TOPICAL
Qty: 45 G | Refills: 2 | Status: SHIPPED | OUTPATIENT
Start: 2018-08-22 | End: 2019-10-01

## 2018-08-22 RX ORDER — VALSARTAN AND HYDROCHLOROTHIAZIDE 320; 25 MG/1; MG/1
TABLET, FILM COATED ORAL
Qty: 30 TAB | Refills: 1 | Status: SHIPPED | OUTPATIENT
Start: 2018-08-22 | End: 2018-09-25 | Stop reason: SDUPTHER

## 2018-08-22 NOTE — PROGRESS NOTES
Chief Complaint   Patient presents with    Skin Problem     excema f/u and medication refill     1. Have you been to the ER, urgent care clinic since your last visit? No Hospitalized since your last visit? No     2. Have you seen or consulted any other health care providers outside of the [address] [place] [address] since your last visit? Include any pap smears or colon screening.  No

## 2018-08-22 NOTE — PROGRESS NOTES
HISTORY OF PRESENT ILLNESS  Ramón Ventura is a 70 y.o. male. HPI Comments: Ramón Ventura is here today to follow up on his eczema and for a refill on his Diprolene. He is out and has a rash on his inner left wrist under his watch band. Also, he stopped his Diovan Hct due to the recall but did not start anything else. Hypertension   The history is provided by the patient. This is a chronic problem. The current episode started more than 1 week ago. The problem occurs constantly. The problem has been gradually worsening. Pertinent negatives include no chest pain, no abdominal pain, no headaches and no shortness of breath. Exacerbated by: stopping Diovan Hct. The symptoms are relieved by medications. Treatments tried: Norvasc. The treatment provided moderate relief. Review of Systems   Constitutional: Negative for weight loss. No weight gain   Eyes: Negative for blurred vision. Respiratory: Negative for shortness of breath. Cardiovascular: Positive for leg swelling. Negative for chest pain. Gastrointestinal: Negative for abdominal pain. Skin: Positive for itching and rash. Neurological: Negative for dizziness, sensory change, speech change, focal weakness and headaches. Visit Vitals    /85    Pulse 60    Temp 97.9 °F (36.6 °C) (Oral)    Resp 18    Ht 5' 10\" (1.778 m)    Wt 213 lb (96.6 kg)    BMI 30.56 kg/m2     BP Readings from Last 3 Encounters:   08/22/18 154/85   08/07/18 129/79   05/07/18 118/75     Physical Exam   Constitutional: He is oriented to person, place, and time. He appears well-developed and well-nourished. No distress. Cardiovascular: Normal rate, regular rhythm and normal heart sounds. Exam reveals no gallop and no friction rub. No murmur heard. Pulmonary/Chest: Effort normal and breath sounds normal. No respiratory distress. He has no wheezes. He has no rales. Musculoskeletal: He exhibits no edema.    Neurological: He is alert and oriented to person, place, and time. Skin: Skin is warm and dry. He is not diaphoretic. Nursing note and vitals reviewed. ASSESSMENT and PLAN    ICD-10-CM ICD-9-CM    1. Eczema, unspecified type L30.9 692.9 augmented betamethasone dipropionate (DIPROLENE-AF) 0.05 % ointment   2. Essential hypertension with goal blood pressure less than 140/90 I10 401.9 valsartan-hydroCHLOROthiazide (DIOVAN-HCT) 320-25 mg per tablet   3. Encounter for FIT (fecal immunochemical test) screening Z12.11 V76.51 OCCULT BLOOD IMMUNOASSAY,DIAGNOSTIC   4. Encounter for immunization Z23 V03.89 pneumococcal 23-valent (PNEUMOVAX 23) 25 mcg/0.5 mL injection        Eczema, symptomatic without Diprolene  Blood pressure elevated, off Diovan Hct  Refills per orders  Labs per orders. Restart Diovan Hct  Kit for FOBT testing given to patient  Pneumovax at pharmacy    Follow-up Disposition:  Return in about 6 weeks (around 10/3/2018) for blood pressure. Reviewed plan of care. Patient has provided input and agrees with goals.

## 2018-08-22 NOTE — MR AVS SNAPSHOT
[address] 
[phone] Patient: Joy Singh MRN: [id number] AZV:0/82/4940 Visit Information Date & Time Provider Department Dept. Phone Encounter #  
 8/22/2018 11:00 AM Hattie Councilman, Via Leland Simon [id number] Follow-up Instructions Return in about 2 months (around 10/22/2018) for blood pressure. Your Appointments 9/11/2018  2:15 PM  
ROUTINE CARE with Hattie Councilman, MD  
[address] [facility]) Appt Note: 6 week HTN follow-medication was changed. [address] [place] [address]  
[phone]  
  
   
 [address] [place] [address]  
  
    
 10/25/2018  9:00 AM  
ROUTINE CARE with Hattie Councilman, MD  
[address] [facility]) Appt Note: 6 month follow up HTN  
 [address]  
[phone]  
  
    
 11/5/2018  2:40 PM  
ESTABLISHED PATIENT with Keith Acosta MD  
[facility]) Appt Note: fu 3 mo; fu 3 mo  
 [facility] [address]  
[address] [place] [address] Upcoming Health Maintenance Date Due FOBT Q 1 YEAR AGE 50-75 9/22/2017 GLAUCOMA SCREENING Q2Y 7/12/2018 Pneumococcal 65+ Low/Medium Risk (2 of 2 - PPSV23) 7/12/2018 Influenza Age 5 to Adult 8/1/2018 DTaP/Tdap/Td series (2 - Td) 9/22/2026 Allergies as of 8/22/2018  Review Complete On: 8/22/2018 By: Hattie Councilman, MD  
  
 Severity Noted Reaction Type Reactions Codeine  03/15/2013    Other (comments) Talking out of head Current Immunizations  Reviewed on 5/11/2017 Name Date Influenza Vaccine 10/7/2013 ZZZ-RETIRED (DO NOT USE) Pneumococcal Vaccine (Unspecified Type) 10/10/2007 Not reviewed this visit You Were Diagnosed With   
  
 Codes Comments Encounter for FIT (fecal immunochemical test) screening    -  Primary ICD-10-CM: Z12.11 ICD-9-CM: V76.51 Essential hypertension with goal blood pressure less than 140/90     ICD-10-CM: I10 
ICD-9-CM: 401.9 Encounter for immunization     ICD-10-CM: K14 ICD-9-CM: V03.89 Eczema, unspecified type     ICD-10-CM: L30.9 ICD-9-CM: 692.9 Vitals BP Pulse Temp Resp Height(growth percentile) Weight(growth percentile) 154/85 60 97.9 °F (36.6 °C) (Oral) 18 5' 10\" (1.778 m) 213 lb (96.6 kg) BMI Smoking Status 30.56 kg/m2 Never Smoker Vitals History BMI and BSA Data Body Mass Index Body Surface Area 30.56 kg/m 2 2.18 m 2 Preferred Pharmacy Pharmacy Name Phone [facility]Y #[id number]- Emilie Gunter, [address] [place], [place] [phone] Your Updated Medication List  
  
   
This list is accurate as of 8/22/18 11:36 AM.  Always use your most recent med list. amLODIPine 5 mg tablet Commonly known as:  Andrea Spaulding Take 1 Tab by mouth daily. For hypertension  
  
 augmented betamethasone dipropionate 0.05 % ointment Commonly known as:  Beecher Hany Apply  to affected area two (2) times daily as needed for Skin Irritation or Itching. Blood Pressure Monitor Kit Commonly known as:  BLOOD PRESSURE KIT Check blood pressure twice daily and record values. Goal is less than 140/90. methotrexate 2.5 mg tablet Commonly known as:  Vani Sherman Take 2.5 mg by mouth every Tuesday. Indications: take 6 tabs every tuesday OTHER  
  
 pneumococcal 23-valent 25 mcg/0.5 mL injection Commonly known as:  PNEUMOVAX 23  
0.5 mL by IntraMUSCular route once for 1 dose. valsartan-hydroCHLOROthiazide 320-25 mg per tablet Commonly known as:  DIOVAN-HCT  
TAKE 1 TAB BY MOUTH DAILY for hypertension. Prescriptions Sent to Pharmacy Refills  
 pneumococcal 23-valent (PNEUMOVAX 23) 25 mcg/0.5 mL injection 0 Si.5 mL by IntraMUSCular route once for 1 dose. Class: Normal  
 [facility] [place] [address] Ph #: [phone] Route: IntraMUSCular  
 valsartan-hydroCHLOROthiazide (DIOVAN-HCT) 320-25 mg per tablet 1 Sig: TAKE 1 TAB BY MOUTH DAILY for hypertension. Class: Normal  
 Pharmacy: [facility]/pharmacy #[id number] Ph #: [phone]  
 augmented betamethasone dipropionate (DIPROLENE-AF) 0.05 % ointment 2 Sig: Apply  to affected area two (2) times daily as needed for Skin Irritation or Itching. Class: Normal  
 [id number] [address] Ph #: [phone] Route: Topical  
  
We Performed the Following OCCULT BLOOD IMMUNOASSAY,DIAGNOSTIC [88324 CPT(R)] Follow-up Instructions Return in about 2 months (around 10/22/2018) for blood pressure. Introducing [facility] & HEALTH SERVICES! Tamir Queen introduces IFTTT patient portal. Now you can access parts of your medical record, email your doctor's office, and request medication refills online. 1. In your internet browser, go to [email or web address] 2. Click on the First Time User? Click Here link in the Sign In box. You will see the New Member Sign Up page. 3. Enter your IFTTT Access Code exactly as it appears below. You will not need to use this code after youve completed the sign-up process. If you do not sign up before the expiration date, you must request a new code. · IFTTT Access Code: [email or web address] Expires: 2018  2:34 PM 
 
4. Enter the last four digits of your Social Security Number (xxxx) and Date of Birth (mm/dd/yyyy) as indicated and click Submit. You will be taken to the next sign-up page. 5. Create a WhoWantsMe ID. This will be your WhoWantsMe login ID and cannot be changed, so think of one that is secure and easy to remember. 6. Create a WhoWantsMe password. You can change your password at any time. 7. Enter your Password Reset Question and Answer. This can be used at a later time if you forget your password. 8. Enter your e-mail address. You will receive e-mail notification when new information is available in [address] 9. Click Sign Up. You can now view and download portions of your medical record. 10. Click the Download Summary menu link to download a portable copy of your medical information. If you have questions, please visit the Frequently Asked Questions section of the WhoWantsMe website. Remember, WhoWantsMe is NOT to be used for urgent needs. For medical emergencies, dial 911. Now available from your iPhone and Android! Please provide this summary of care documentation to your next provider. Your primary care clinician is listed as Basil Phoenix. If you have any questions after today's visit, please call [phone].

## 2018-09-12 DIAGNOSIS — M06.09 SERONEGATIVE RHEUMATOID ARTHRITIS OF MULTIPLE SITES (HCC): ICD-10-CM

## 2018-09-12 DIAGNOSIS — Z79.60 LONG-TERM USE OF IMMUNOSUPPRESSANT MEDICATION: ICD-10-CM

## 2018-09-12 RX ORDER — FOLIC ACID 1 MG/1
TABLET ORAL
Qty: 90 TAB | Refills: 0 | Status: SHIPPED | OUTPATIENT
Start: 2018-09-12 | End: 2018-10-01 | Stop reason: SDUPTHER

## 2018-09-25 DIAGNOSIS — I10 ESSENTIAL HYPERTENSION WITH GOAL BLOOD PRESSURE LESS THAN 140/90: ICD-10-CM

## 2018-09-25 RX ORDER — AMLODIPINE BESYLATE 5 MG/1
5 TABLET ORAL DAILY
Qty: 90 TAB | Refills: 0 | Status: SHIPPED | OUTPATIENT
Start: 2018-09-25 | End: 2018-12-29 | Stop reason: SDUPTHER

## 2018-09-25 RX ORDER — METHOTREXATE 2.5 MG/1
15 TABLET ORAL
Qty: 72 TAB | Refills: 0 | OUTPATIENT
Start: 2018-09-25

## 2018-09-25 RX ORDER — VALSARTAN AND HYDROCHLOROTHIAZIDE 320; 25 MG/1; MG/1
TABLET, FILM COATED ORAL
Qty: 90 TAB | Refills: 0 | Status: SHIPPED | OUTPATIENT
Start: 2018-09-25 | End: 2018-12-30 | Stop reason: SDUPTHER

## 2018-09-25 NOTE — TELEPHONE ENCOUNTER
He needs to have his labs drawn. I ordered them last time, but he was in a hurry, so I gave him labs to draw near him.  He needs to have the labs done before I refill methotrexate

## 2018-09-26 DIAGNOSIS — M06.09 SERONEGATIVE RHEUMATOID ARTHRITIS OF MULTIPLE SITES (HCC): ICD-10-CM

## 2018-09-28 RX ORDER — METHOTREXATE 2.5 MG/1
TABLET ORAL
Qty: 72 TAB | Refills: 0 | Status: SHIPPED | OUTPATIENT
Start: 2018-09-28 | End: 2019-02-14 | Stop reason: SDUPTHER

## 2018-10-01 ENCOUNTER — OFFICE VISIT (OUTPATIENT)
Dept: FAMILY MEDICINE CLINIC | Age: 63
End: 2018-10-01

## 2018-10-01 VITALS
TEMPERATURE: 98.2 F | HEIGHT: 70 IN | BODY MASS INDEX: 30.06 KG/M2 | DIASTOLIC BLOOD PRESSURE: 82 MMHG | SYSTOLIC BLOOD PRESSURE: 132 MMHG | HEART RATE: 63 BPM | WEIGHT: 210 LBS | RESPIRATION RATE: 18 BRPM

## 2018-10-01 DIAGNOSIS — I10 ESSENTIAL HYPERTENSION WITH GOAL BLOOD PRESSURE LESS THAN 140/90: Primary | ICD-10-CM

## 2018-10-01 DIAGNOSIS — M06.09 SERONEGATIVE RHEUMATOID ARTHRITIS OF MULTIPLE SITES (HCC): ICD-10-CM

## 2018-10-01 DIAGNOSIS — Z79.60 LONG-TERM USE OF IMMUNOSUPPRESSANT MEDICATION: ICD-10-CM

## 2018-10-01 DIAGNOSIS — N18.2 CKD (CHRONIC KIDNEY DISEASE) STAGE 2, GFR 60-89 ML/MIN: ICD-10-CM

## 2018-10-01 RX ORDER — FOLIC ACID 1 MG/1
TABLET ORAL
Qty: 90 TAB | Refills: 3 | Status: SHIPPED | OUTPATIENT
Start: 2018-10-01 | End: 2019-03-01 | Stop reason: SDUPTHER

## 2018-10-01 NOTE — MR AVS SNAPSHOT
[address] 
[phone] Patient: Destiny Cutler MRN: [id number] CGZ:0/30/0053 Visit Information Date & Time Provider Department Dept. Phone Encounter #  
 10/1/2018  8:45 AM Kevin Lopezbambi 34 [id number] Your Appointments 10/25/2018  9:00 AM  
ROUTINE CARE with Tita Doe MD  
[address] [place] [address]) Appt Note: 6 month follow up HTN  
 [address] [facility] [address]  
[phone]  
  
   
 [address] [facility] [address] [address]  
  
    
 11/5/2018  2:40 PM  
ESTABLISHED PATIENT with Shruti Olsen MD  
[address]) Appt Note: fu 3 mo; fu 3 mo  
 [address] [facility] [address]  
[address] [place] [address] Upcoming Health Maintenance Date Due Shingrix Vaccine Age 50> (1 of 2) 7/12/2003 FOBT Q 1 YEAR AGE 50-75 9/22/2017 GLAUCOMA SCREENING Q2Y 7/12/2018 Pneumococcal 65+ Low/Medium Risk (2 of 2 - PPSV23) 7/12/2018 Influenza Age 5 to Adult 8/1/2018 DTaP/Tdap/Td series (2 - Td) 9/22/2026 Allergies as of 10/1/2018  Review Complete On: 10/1/2018 By: Tita Doe MD  
  
 Severity Noted Reaction Type Reactions Codeine  03/15/2013    Other (comments) Talking out of head Current Immunizations  Reviewed on 5/11/2017 Name Date Influenza Vaccine 10/7/2013 ZZZ-RETIRED (DO NOT USE) Pneumococcal Vaccine (Unspecified Type) 10/10/2007 Not reviewed this visit You Were Diagnosed With   
  
 Codes Comments Essential hypertension with goal blood pressure less than 140/90    -  Primary ICD-10-CM: I10 
ICD-9-CM: 401.9 CKD (chronic kidney disease) stage 2, GFR 60-89 ml/min     ICD-10-CM: N18.2 ICD-9-CM: 585.2 Long-term use of immunosuppressant medication     ICD-10-CM: Z79.899 ICD-9-CM: V58.69 Seronegative rheumatoid arthritis of multiple sites [facility])     ICD-10-CM: M06.09 
ICD-9-CM: 714.0 Vitals BP Pulse Temp Resp Height(growth percentile) Weight(growth percentile) 132/82 63 98.2 °F (36.8 °C) (Oral) 18 5' 10\" (1.778 m) 210 lb (95.3 kg) BMI Smoking Status 30.13 kg/m2 Never Smoker Vitals History BMI and BSA Data Body Mass Index Body Surface Area  
 30.13 kg/m 2 2.17 m 2 Preferred Pharmacy Pharmacy Name Phone [id number]- [place], [address] [facility] [phone] Your Updated Medication List  
  
   
This list is accurate as of 10/1/18  9:15 AM.  Always use your most recent med list. amLODIPine 5 mg tablet Commonly known as:  Dahiana Harvey Take 1 Tab by mouth daily. For hypertension  
  
 augmented betamethasone dipropionate 0.05 % ointment Commonly known as:  Karina Garcialey Apply  to affected area two (2) times daily as needed for Skin Irritation or Itching. Blood Pressure Monitor Kit Commonly known as:  BLOOD PRESSURE KIT Check blood pressure twice daily and record values. Goal is less than 140/90. folic acid 1 mg tablet Commonly known as:  FOLVITE  
TAKE 1 TABLET BY MOUTH EVERY DAY  
  
 * methotrexate 2.5 mg tablet Commonly known as:  Joellyn Pretty Take 2.5 mg by mouth every Tuesday. Indications: take 6 tabs every tuesday * methotrexate 2.5 mg tablet Commonly known as:  RHEUMATREX  
TAKE 6 TABS BY MOUTH EVERY TUESDAY FOR 90 DAYS. OTHER  
  
 valsartan-hydroCHLOROthiazide 320-25 mg per tablet Commonly known as:  DIOVAN-HCT  
TAKE 1 TAB BY MOUTH DAILY for hypertension. * Notice: This list has 2 medication(s) that are the same as other medications prescribed for you.  Read the directions carefully, and ask your doctor or other care provider to review them with you. Prescriptions Sent to Pharmacy Refills  
 folic acid (FOLVITE) 1 mg tablet 3 Sig: TAKE 1 TABLET BY MOUTH EVERY DAY Class: Normal  
 Pharmacy: [facility]/pharmacy #[id number] #: [phone] We Performed the Following METABOLIC PANEL, BASIC [26692 CPT(R)] Introducing [facility] & HEALTH SERVICES! [employer] introduces Isabella Oliver patient portal. Now you can access parts of your medical record, email your doctor's office, and request medication refills online. 1. In your internet browser, go to [email or web address] 2. Click on the First Time User? Click Here link in the Sign In box. You will see the New Member Sign Up page. 3. Enter your Isabella Oliver Access Code exactly as it appears below. You will not need to use this code after youve completed the sign-up process. If you do not sign up before the expiration date, you must request a new code. · Isabella Oliver Access Code: [email or web address] Expires: 11/5/2018  2:34 PM 
 
4. Enter the last four digits of your Social Security Number (xxxx) and Date of Birth (mm/dd/yyyy) as indicated and click Submit. You will be taken to the next sign-up page. 5. Create a Isabella Oliver ID. This will be your Isabella Oliver login ID and cannot be changed, so think of one that is secure and easy to remember. 6. Create a Isabella Oliver password. You can change your password at any time. 7. Enter your Password Reset Question and Answer. This can be used at a later time if you forget your password. 8. Enter your e-mail address. You will receive e-mail notification when new information is available in [address] 9. Click Sign Up. You can now view and download portions of your medical record. 10. Click the Download Summary menu link to download a portable copy of your medical information. If you have questions, please visit the Frequently Asked Questions section of the LIKECHARITYt website. Remember, H-art ([employer]) is NOT to be used for urgent needs. For medical emergencies, dial 911. Now available from your iPhone and Android! Please provide this summary of care documentation to your next provider. Your primary care clinician is listed as Zain Harman. If you have any questions after today's visit, please call [phone].

## 2018-10-01 NOTE — LETTER
10/18/2018 8:13 AM 
 
Mr. Maggy Cruz [id number] Dear Maggy Meyer: 
 
Please find your most recent results below. Resulted Orders METABOLIC PANEL, BASIC Result Value Ref Range Glucose 99 65 - 99 mg/dL BUN 18 8 - 27 mg/dL Creatinine 1.40 (H) 0.76 - 1.27 mg/dL GFR est non-AA 52 (L) >59 mL/min/1.73 GFR est AA 61 >59 mL/min/1.73  
 BUN/Creatinine ratio 13 10 - 24 Sodium 141 134 - 144 mmol/L Potassium 4.1 3.5 - 5.2 mmol/L Chloride 104 96 - 106 mmol/L  
 CO2 22 20 - 29 mmol/L Calcium 8.8 8.6 - 10.2 mg/dL Narrative Performed at:  [address]  [id number] : Iman Flores MD, Phone:  [phone] CKD REPORT Result Value Ref Range Interpretation Note Comment:  
   Supplemental report is available. Narrative Performed at:  [address] [place] [address] [address] [place] [address]  [id number] : Wally Yo MD, Phone:  [phone] RECOMMENDATIONS: 
Your kidney function (GFR) has dropped a little more. Please avoid NSAIDS (things like Motrin, ibuprofen, Advil, Aleve, naprosyn, BC's, Goodies, etc.)  If you are taking a daily aspirin, please continue this. Also, if you should ever need IV contrast, please let them know you have kidney issues. You may takeTylenol or acetaminophen for pain. Take care! Please call me if you have any questions: [phone] Sincerely, 
 
 
Juanito Tomlinson MD

## 2018-10-01 NOTE — PROGRESS NOTES
HISTORY OF PRESENT ILLNESS  Sarah Hutchinson is a 70 y.o. male. Hypertension   The history is provided by the patient. This is a chronic problem. The current episode started more than 1 week ago. The problem occurs constantly. The problem has been gradually improving. Pertinent negatives include no chest pain, no abdominal pain, no headaches and no shortness of breath. Nothing aggravates the symptoms. The symptoms are relieved by medications. Treatments tried: Norvasc, Diovan Hct. The treatment provided significant relief. Review of Systems   Constitutional: Negative for weight loss. No weight gain   Eyes: Negative for blurred vision. Respiratory: Negative for shortness of breath. Cardiovascular: Negative for chest pain and leg swelling. Gastrointestinal: Negative for abdominal pain. Neurological: Negative for dizziness, sensory change, speech change, focal weakness and headaches. Visit Vitals    /82    Pulse 63    Temp 98.2 °F (36.8 °C) (Oral)    Resp 18    Ht 5' 10\" (1.778 m)    Wt 210 lb (95.3 kg)    BMI 30.13 kg/m2     BP Readings from Last 3 Encounters:   10/01/18 132/82   08/22/18 154/85   08/07/18 129/79       Physical Exam   Constitutional: He is oriented to person, place, and time. He appears well-developed and well-nourished. No distress. Cardiovascular: Normal rate, regular rhythm and normal heart sounds. Exam reveals no gallop and no friction rub. No murmur heard. Pulmonary/Chest: Effort normal and breath sounds normal. No respiratory distress. He has no wheezes. He has no rales. Musculoskeletal: He exhibits no edema. Neurological: He is alert and oriented to person, place, and time. Skin: Skin is warm and dry. He is not diaphoretic. Nursing note and vitals reviewed. ASSESSMENT and PLAN    ICD-10-CM ICD-9-CM    1. Essential hypertension with goal blood pressure less than 140/90 K71 316.2 METABOLIC PANEL, BASIC   2.  CKD (chronic kidney disease) stage 2, GFR 60-89 ml/min R85.7 124.5 METABOLIC PANEL, BASIC   3. Long-term use of immunosuppressant medication K79.235 Q93.53 folic acid (FOLVITE) 1 mg tablet   4. Seronegative rheumatoid arthritis of multiple sites ([facility] [place] D21.04 345.3 folic acid (FOLVITE) 1 mg tablet        Blood pressure controlled  Continue current plans. Labs per orders. Refills per orders    Follow-up Disposition:  Return in about 6 months (around 4/1/2019) for blood pressure. Reviewed plan of care. Patient has provided input and agrees with goals.

## 2018-10-01 NOTE — PROGRESS NOTES
Chief Complaint   Patient presents with    Hypertension     6 wk f/u      1. Have you been to the ER, urgent care clinic since your last visit? No  Hospitalized since your last visit? No     2. Have you seen or consulted any other health care providers outside of the [address] [place] [address] since your last visit? Include any pap smears or colon screening.  No

## 2018-10-02 LAB
BUN SERPL-MCNC: 18 MG/DL (ref 8–27)
BUN/CREAT SERPL: 13 (ref 10–24)
CALCIUM SERPL-MCNC: 8.8 MG/DL (ref 8.6–10.2)
CHLORIDE SERPL-SCNC: 104 MMOL/L (ref 96–106)
CO2 SERPL-SCNC: 22 MMOL/L (ref 20–29)
CREAT SERPL-MCNC: 1.4 MG/DL (ref 0.76–1.27)
GLUCOSE SERPL-MCNC: 99 MG/DL (ref 65–99)
INTERPRETATION: NORMAL
POTASSIUM SERPL-SCNC: 4.1 MMOL/L (ref 3.5–5.2)
SODIUM SERPL-SCNC: 141 MMOL/L (ref 134–144)

## 2018-12-29 DIAGNOSIS — I10 ESSENTIAL HYPERTENSION WITH GOAL BLOOD PRESSURE LESS THAN 140/90: ICD-10-CM

## 2018-12-30 DIAGNOSIS — I10 ESSENTIAL HYPERTENSION WITH GOAL BLOOD PRESSURE LESS THAN 140/90: ICD-10-CM

## 2018-12-31 RX ORDER — AMLODIPINE BESYLATE 5 MG/1
TABLET ORAL
Qty: 90 TAB | Refills: 0 | Status: SHIPPED | OUTPATIENT
Start: 2018-12-31 | End: 2019-04-03 | Stop reason: SDUPTHER

## 2018-12-31 RX ORDER — VALSARTAN AND HYDROCHLOROTHIAZIDE 320; 25 MG/1; MG/1
TABLET, FILM COATED ORAL
Qty: 90 TAB | Refills: 0 | Status: SHIPPED | OUTPATIENT
Start: 2018-12-31 | End: 2019-04-03 | Stop reason: SDUPTHER

## 2019-01-16 ENCOUNTER — OFFICE VISIT (OUTPATIENT)
Dept: FAMILY MEDICINE CLINIC | Age: 64
End: 2019-01-16

## 2019-01-16 VITALS
TEMPERATURE: 98.3 F | SYSTOLIC BLOOD PRESSURE: 112 MMHG | HEART RATE: 62 BPM | BODY MASS INDEX: 29.63 KG/M2 | WEIGHT: 207 LBS | DIASTOLIC BLOOD PRESSURE: 70 MMHG | HEIGHT: 70 IN | RESPIRATION RATE: 18 BRPM

## 2019-01-16 DIAGNOSIS — Z12.11 ENCOUNTER FOR FIT (FECAL IMMUNOCHEMICAL TEST) SCREENING: ICD-10-CM

## 2019-01-16 DIAGNOSIS — M76.61 ACHILLES TENDINITIS OF RIGHT LOWER EXTREMITY: ICD-10-CM

## 2019-01-16 DIAGNOSIS — M72.2 PLANTAR FASCIITIS: Primary | ICD-10-CM

## 2019-01-16 DIAGNOSIS — Z23 ENCOUNTER FOR IMMUNIZATION: ICD-10-CM

## 2019-01-16 NOTE — PROGRESS NOTES
HISTORY OF PRESENT ILLNESS  Ricky Solomon is a 70 y.o. male. Ricky Solomon presents with right foot pain starting 1 1/2 weeks ago. No initiating factors. He just woke up, put his foot on the floor and felt a sharp pain in the sole. It has gotten better. No new shoes or unusual activities. Standing makes it worse and walking on carpet and elevating it makes it better. The pain is in the heel and arch and sometimes shoots up the back of the leg. Review of Systems   Cardiovascular: Negative for leg swelling. Musculoskeletal:        Right sole pain       Visit Vitals  /70   Pulse 62   Temp 98.3 °F (36.8 °C) (Oral)   Resp 18   Ht 5' 10\" (1.778 m)   Wt 207 lb (93.9 kg)   BMI 29.70 kg/m²     Physical Exam   Constitutional: He is oriented to person, place, and time. He appears well-developed and well-nourished. No distress. Musculoskeletal:        Right foot: There is tenderness and deformity. There is no bony tenderness and normal capillary refill. Feet:    Tenderness in the arch and Achilles   Neurological: He is alert and oriented to person, place, and time. Skin: He is not diaphoretic. ASSESSMENT and PLAN    ICD-10-CM ICD-9-CM    1. Plantar fasciitis M72.2 728.71    2. Achilles tendinitis of right lower extremity M76.61 726.71    3. Encounter for FIT (fecal immunochemical test) screening Z12.11 V76.51 OCCULT BLOOD IMMUNOASSAY,DIAGNOSTIC   4. Encounter for immunization Z23 V03.89 pneumococcal 23-valent (PNEUMOVAX 23) 25 mcg/0.5 mL injection      varicella-zoster recombinant, PF, (SHINGRIX, PF,) 50 mcg/0.5 mL susr injection        Supportive, padded shoes  Advil prn  Exercises given  Pneumovax and Shingrix at pharmacy    Follow-up Disposition:  Return if symptoms worsen or fail to improve. Reviewed plan of care. Patient has provided input and agrees with goals.

## 2019-01-16 NOTE — PROGRESS NOTES
Chief Complaint   Patient presents with    Foot Pain     rt foot x 1.5 wks     1. Have you been to the ER, urgent care clinic since your last visit? Hospitalized since your last visit? No     2. Have you seen or consulted any other health care providers outside of the [address] [place] [address] since your last visit? Include any pap smears or colon screening.  No

## 2019-01-16 NOTE — PATIENT INSTRUCTIONS
Plantar Fasciitis: Care Instructions  Your Care Instructions    Plantar fasciitis is pain and inflammation of the plantar fascia, the tissue at the bottom of your foot that connects the heel bone to the toes. The plantar fascia also supports the arch. If you strain the plantar fascia, it can develop small tears and cause heel pain when you stand or walk. Plantar fasciitis can be caused by running or other sports. It also may occur in people who are overweight or who have high arches or flat feet. You may get plantar fasciitis if you walk or stand for long periods, or have a tight Achilles tendon or calf muscles. You can improve your foot pain with rest and other care at home. It might take a few weeks to a few months for your foot to heal completely. Follow-up care is a key part of your treatment and safety. Be sure to make and go to all appointments, and call your doctor if you are having problems. It's also a good idea to know your test results and keep a list of the medicines you take. How can you care for yourself at home? · Rest your feet often. Reduce your activity to a level that lets you avoid pain. If possible, do not run or walk on hard surfaces. · Take pain medicines exactly as directed. ? If the doctor gave you a prescription medicine for pain, take it as prescribed. ? If you are not taking a prescription pain medicine, take an over-the-counter anti-inflammatory medicine for pain and swelling, such as ibuprofen (Advil, Motrin) or naproxen (Aleve). Read and follow all instructions on the label. · Use ice massage to help with pain and swelling. You can use an ice cube or an ice cup several times a day. To make an ice cup, fill a paper cup with water and freeze it. Cut off the top of the cup until a half-inch of ice shows. Hold onto the remaining paper to use the cup. Rub the ice in small circles over the area for 5 to 7 minutes.   · Contrast baths, which alternate hot and cold water, can also help reduce swelling. But because heat alone may make pain and swelling worse, end a contrast bath with a soak in cold water. · Wear a night splint if your doctor suggests it. A night splint holds your foot with the toes pointed up and the foot and ankle at a 90-degree angle. This position gives the bottom of your foot a constant, gentle stretch. · Do simple exercises such as calf stretches and towel stretches 2 to 3 times each day, especially when you first get up in the morning. These can help the plantar fascia become more flexible. They also make the muscles that support your arch stronger. Hold these stretches for 15 to 30 seconds per stretch. Repeat 2 to 4 times. ? Stand about 1 foot from a wall. Place the palms of both hands against the wall at chest level. Lean forward against the wall, keeping one leg with the knee straight and heel on the ground while bending the knee of the other leg.  ? Sit down on the floor or a mat with your feet stretched in front of you. Roll up a towel lengthwise, and loop it over the ball of your foot. Holding the towel at both ends, gently pull the towel toward you to stretch your foot. · Wear shoes with good arch support. Athletic shoes or shoes with a well-cushioned sole are good choices. · Try heel cups or shoe inserts (orthotics) to help cushion your heel. You can buy these at many shoe stores. · Put on your shoes as soon as you get out of bed. Going barefoot or wearing slippers may make your pain worse. · Reach and stay at a good weight for your height. This puts less strain on your feet. When should you call for help? Call your doctor now or seek immediate medical care if:    · You have heel pain with fever, redness, or warmth in your heel.     · You cannot put weight on the sore foot.    Watch closely for changes in your health, and be sure to contact your doctor if:    · You have numbness or tingling in your heel.     · Your heel pain lasts more than 2 weeks. Where can you learn more? Go to http://nancy-vidal.info/. Mel Baumgarten in the search box to learn more about \"Plantar Fasciitis: Care Instructions. \"  Current as of: November 29, 2017  Content Version: 11.8  © 6622-3896 [employer]. Care instructions adapted under license by [employer] (which disclaims liability or warranty for this information). If you have questions about a medical condition or this instruction, always ask your healthcare professional. [place] [address] any warranty or liability for your use of this information. Plantar Fasciitis: Exercises  Your Care Instructions  Here are some examples of typical rehabilitation exercises for your condition. Start each exercise slowly. Ease off the exercise if you start to have pain. Your doctor or physical therapist will tell you when you can start these exercises and which ones will work best for you. How to do the exercises  Towel stretch    1. Sit with your legs extended and knees straight. 2. Place a towel around your foot just under the toes. 3. Hold each end of the towel in each hand, with your hands above your knees. 4. Pull back with the towel so that your foot stretches toward you. 5. Hold the position for at least 15 to 30 seconds. 6. Repeat 2 to 4 times a session, up to 5 sessions a day. Calf stretch    1. Stand facing a wall with your hands on the wall at about eye level. Put the leg you want to stretch about a step behind your other leg. 2. Keeping your back heel on the floor, bend your front knee until you feel a stretch in the back leg. 3. Hold the stretch for 15 to 30 seconds. Repeat 2 to 4 times. Plantar fascia and calf stretch    1. Stand on a step as shown above. Be sure to hold on to the banister. 2. Slowly let your heels down over the edge of the step as you relax your calf muscles.  You should feel a gentle stretch across the bottom of your foot and up the back of your leg to your knee. 3. Hold the stretch about 15 to 30 seconds, and then tighten your calf muscle a little to bring your heel back up to the level of the step. Repeat 2 to 4 times. Towel curls    1. While sitting, place your foot on a towel on the floor and scrunch the towel toward you with your toes. 2. Then, also using your toes, push the towel away from you. [place] pickups    1. Put marbles on the floor next to a cup.  2. Using your toes, try to lift the marbles up from the floor and put them in the cup. Follow-up care is a key part of your treatment and safety. Be sure to make and go to all appointments, and call your doctor if you are having problems. It's also a good idea to know your test results and keep a list of the medicines you take. Where can you learn more? Go to http://nancy-vidal.info/. Sandro Chaney in the search box to learn more about \"Plantar Fasciitis: Exercises. \"  Current as of: November 29, 2017  Content Version: 11.8  © 4686-3165 [employer]. Care instructions adapted under license by [employer] (which disclaims liability or warranty for this information). If you have questions about a medical condition or this instruction, always ask your healthcare professional. [address] any warranty or liability for your use of this information. Achilles Tendon: Exercises  Your Care Instructions  Here are some examples of exercises for your achilles tendon. Start each exercise slowly. Ease off the exercise if you start to have pain. Your doctor or physical therapist will tell you when you can start these exercises and which ones will work best for you. Toe stretch  Toe stretch    1. Sit in a chair, and extend your affected leg so that your heel is on the floor. 2. With your hand, reach down and pull your big toe up and back. Pull toward your ankle and away from the floor.   3. Hold the position for at least 15 to 30 seconds. 4. Repeat 2 to 4 times a session, several times a day. Calf-plantar fascia stretch    1. Sit with your legs extended and knees straight. 2. Place a towel around your foot just under the toes. 3. Hold each end of the towel in each hand, with your hands above your knees. 4. Pull back with the towel so that your foot stretches toward you. 5. Hold the position for at least 15 to 30 seconds. 6. Repeat 2 to 4 times a session, up to 5 sessions a day. Floor stretch    1. Stand about 2 feet from a wall, and place your hands on the wall at about shoulder height. Or you can stand behind a chair, placing your hands on the back of it for balance. 2. Step back with the leg you want to stretch. Keep the leg straight, and press your heel into the floor with your toe turned slightly in.  3. Lean forward, and bend your other leg slightly. Feel the stretch in the Achilles tendon of your back leg. Hold for at least 15 to 30 seconds. 4. Repeat 2 to 4 times a session, up to 5 sessions a day. Stair stretch    1. Stand with the balls of both feet on the edge of a step or curb (or a medium-sized phone book). With at least one hand, hold onto something solid for balance, such as a banister or handrail. 2. Keeping your affected leg straight, slowly let that heel hang down off of the step or curb until you feel a stretch in the back of your calf and/or Achilles area. Some of your weight should still be on the other leg. 3. Hold this position for at least 15 to 30 seconds. 4. Repeat 2 to 4 times a session, up to 5 times a day or whenever your Achilles tendon starts to feel tight. This stretch can also be done with your knee slightly bent. Strength exercise    1. This exercise will get you started on building strength after an Achilles tendon injury. Your doctor or physical therapist can help you move on to more challenging exercises as you heal and get stronger.   2. Stand on a step with your heel off the edge of the step. Hold on to a handrail or wall for balance. 3. Push up on your toes, then slowly count to 10 as you lower yourself back down until your heel is below the step. If it hurts to push up on your toes, try putting most of your weight on your other foot as you push up, or try using your arms to help you. If you can't do this exercise without causing pain, stop the exercise and talk to your doctor. 4. Repeat the exercise 8 to 12 times, half with the knee straight and half with the knee bent. Follow-up care is a key part of your treatment and safety. Be sure to make and go to all appointments, and call your doctor if you are having problems. It's also a good idea to know your test results and keep a list of the medicines you take. Where can you learn more? Go to http://nancy-vidal.info/. Enter U192 in the search box to learn more about \"Achilles Tendon: Exercises. \"  Current as of: November 29, 2017  Content Version: 11.8  © 6915-8204 [employer]. Care instructions adapted under license by [employer] (which disclaims liability or warranty for this information). If you have questions about a medical condition or this instruction, always ask your healthcare professional. [address] any warranty or liability for your use of this information.

## 2019-01-30 ENCOUNTER — TELEPHONE (OUTPATIENT)
Dept: RHEUMATOLOGY | Age: 64
End: 2019-01-30

## 2019-01-30 NOTE — TELEPHONE ENCOUNTER
Spoke with pt and let him know that we do not have samples of methotrexate in the office. He asked if he could have a prescription for the methotrexate to last until his appt at the beginning of March? Please advise.

## 2019-01-30 NOTE — TELEPHONE ENCOUNTER
Patient wants to know if he can have some samples of Methotrexate to hold him over until his next appointment on March 1st. Call back number is [phone].

## 2019-01-31 DIAGNOSIS — M05.79 SEROPOSITIVE RHEUMATOID ARTHRITIS OF MULTIPLE SITES (HCC): Primary | ICD-10-CM

## 2019-01-31 DIAGNOSIS — Z79.60 LONG-TERM USE OF IMMUNOSUPPRESSANT MEDICATION: ICD-10-CM

## 2019-01-31 NOTE — TELEPHONE ENCOUNTER
Spoke with pt and let him know that Dr. Greg Hall will send in more methotrexate for him, but he needs to get labs done first to make sure his liver enzymes and such are ok to continue taking the medication. He stated an understanding and will come here to get the labs done.

## 2019-02-12 LAB
ALBUMIN SERPL-MCNC: 4 G/DL (ref 3.6–4.8)
ALBUMIN/GLOB SERPL: 1.2 {RATIO} (ref 1.2–2.2)
ALP SERPL-CCNC: 101 IU/L (ref 39–117)
ALT SERPL-CCNC: 28 IU/L (ref 0–44)
AST SERPL-CCNC: 15 IU/L (ref 0–40)
BASOPHILS # BLD AUTO: 0 X10E3/UL (ref 0–0.2)
BASOPHILS NFR BLD AUTO: 1 %
BILIRUB SERPL-MCNC: 0.4 MG/DL (ref 0–1.2)
BUN SERPL-MCNC: 19 MG/DL (ref 8–27)
BUN/CREAT SERPL: 15 (ref 10–24)
CALCIUM SERPL-MCNC: 9.2 MG/DL (ref 8.6–10.2)
CHLORIDE SERPL-SCNC: 103 MMOL/L (ref 96–106)
CO2 SERPL-SCNC: 25 MMOL/L (ref 20–29)
CREAT SERPL-MCNC: 1.26 MG/DL (ref 0.76–1.27)
CRP SERPL-MCNC: 8.4 MG/L (ref 0–4.9)
EOSINOPHIL # BLD AUTO: 0.1 X10E3/UL (ref 0–0.4)
EOSINOPHIL NFR BLD AUTO: 3 %
ERYTHROCYTE [DISTWIDTH] IN BLOOD BY AUTOMATED COUNT: 15 % (ref 12.3–15.4)
ERYTHROCYTE [SEDIMENTATION RATE] IN BLOOD BY WESTERGREN METHOD: 12 MM/HR (ref 0–30)
GLOBULIN SER CALC-MCNC: 3.4 G/DL (ref 1.5–4.5)
GLUCOSE SERPL-MCNC: 87 MG/DL (ref 65–99)
HCT VFR BLD AUTO: 30.8 % (ref 37.5–51)
HGB BLD-MCNC: 10.6 G/DL (ref 13–17.7)
IMM GRANULOCYTES # BLD AUTO: 0 X10E3/UL (ref 0–0.1)
IMM GRANULOCYTES NFR BLD AUTO: 0 %
LYMPHOCYTES # BLD AUTO: 1.5 X10E3/UL (ref 0.7–3.1)
LYMPHOCYTES NFR BLD AUTO: 40 %
MCH RBC QN AUTO: 31.1 PG (ref 26.6–33)
MCHC RBC AUTO-ENTMCNC: 34.4 G/DL (ref 31.5–35.7)
MCV RBC AUTO: 90 FL (ref 79–97)
MONOCYTES # BLD AUTO: 0.4 X10E3/UL (ref 0.1–0.9)
MONOCYTES NFR BLD AUTO: 11 %
NEUTROPHILS # BLD AUTO: 1.7 X10E3/UL (ref 1.4–7)
NEUTROPHILS NFR BLD AUTO: 45 %
PLATELET # BLD AUTO: 243 X10E3/UL (ref 150–379)
POTASSIUM SERPL-SCNC: 4.6 MMOL/L (ref 3.5–5.2)
PROT SERPL-MCNC: 7.4 G/DL (ref 6–8.5)
RBC # BLD AUTO: 3.41 X10E6/UL (ref 4.14–5.8)
SODIUM SERPL-SCNC: 142 MMOL/L (ref 134–144)
WBC # BLD AUTO: 3.8 X10E3/UL (ref 3.4–10.8)

## 2019-02-12 NOTE — PROGRESS NOTES
The results were reviewed and a letter was sent. Slight worsening anemia, likely from inflammation. Elevated inflammatory marker (CRP).  Stable chronic kidney disease

## 2019-02-14 DIAGNOSIS — M06.09 SERONEGATIVE RHEUMATOID ARTHRITIS OF MULTIPLE SITES (HCC): ICD-10-CM

## 2019-02-15 RX ORDER — METHOTREXATE 2.5 MG/1
TABLET ORAL
Qty: 72 TAB | Refills: 0 | Status: SHIPPED | OUTPATIENT
Start: 2019-02-15 | End: 2019-03-01 | Stop reason: SDUPTHER

## 2019-03-01 ENCOUNTER — OFFICE VISIT (OUTPATIENT)
Dept: RHEUMATOLOGY | Age: 64
End: 2019-03-01

## 2019-03-01 VITALS
SYSTOLIC BLOOD PRESSURE: 131 MMHG | BODY MASS INDEX: 28.77 KG/M2 | WEIGHT: 201 LBS | TEMPERATURE: 97.8 F | DIASTOLIC BLOOD PRESSURE: 81 MMHG | HEIGHT: 70 IN | RESPIRATION RATE: 18 BRPM | HEART RATE: 62 BPM

## 2019-03-01 DIAGNOSIS — Z79.60 LONG-TERM USE OF IMMUNOSUPPRESSANT MEDICATION: ICD-10-CM

## 2019-03-01 DIAGNOSIS — N18.2 CKD (CHRONIC KIDNEY DISEASE) STAGE 2, GFR 60-89 ML/MIN: ICD-10-CM

## 2019-03-01 DIAGNOSIS — H20.9 IRITIS: ICD-10-CM

## 2019-03-01 DIAGNOSIS — M05.79 SEROPOSITIVE RHEUMATOID ARTHRITIS OF MULTIPLE SITES (HCC): Primary | ICD-10-CM

## 2019-03-01 PROBLEM — M06.09 SERONEGATIVE RHEUMATOID ARTHRITIS OF MULTIPLE SITES (HCC): Status: RESOLVED | Noted: 2018-05-07 | Resolved: 2019-03-01

## 2019-03-01 RX ORDER — METHOTREXATE 2.5 MG/1
TABLET ORAL
Qty: 72 TAB | Refills: 0 | Status: SHIPPED | OUTPATIENT
Start: 2019-03-01 | End: 2019-03-01 | Stop reason: SDUPTHER

## 2019-03-01 RX ORDER — METHOTREXATE 2.5 MG/1
20 TABLET ORAL
Qty: 96 TAB | Refills: 0 | Status: SHIPPED | OUTPATIENT
Start: 2019-03-05 | End: 2019-06-04 | Stop reason: SDUPTHER

## 2019-03-01 RX ORDER — FOLIC ACID 1 MG/1
TABLET ORAL
Qty: 90 TAB | Refills: 3 | Status: SHIPPED | OUTPATIENT
Start: 2019-03-01 | End: 2019-06-04 | Stop reason: SDUPTHER

## 2019-03-01 NOTE — PROGRESS NOTES
REASON FOR VISIT This is a follow-up visit for Mr. Cornelius Lindo for Seropositive Rheumatoid Arthritis. Inflammatory arthritis phenotype includes: Anti-CCP positive: pending Rheumatoid factor positive: yes (179.6) Erosive disease: no 
Extra-articular manifestations include: iritis Immunosuppression Screening (N/A): Quantiferon TB: pending PPD:  Not performed Hepatitis B: pending Hepatitis C: negative (9/22/2016) Therapy History includes: 
Current DMARD therapy include: methotrexate 15 mg every Tuesday Prior DMARD therapy include: none Discontinued DMARDs because of inefficacy: None Discontinued DMARDs because of side effects: None Immunization History Administered Date(s) Administered  Influenza Vaccine 10/07/2013  ZZZ-RETIRED (DO NOT USE) Pneumococcal Vaccine (Unspecified Type) 10/10/2007 Patient Active Problem List  
Diagnosis Code  Seropositive rheumatoid arthritis of multiple sites (HCC) M05.79  
 Eczema L30.9  Elevated cholesterol E78.00  BPH (benign prostatic hypertrophy) N40.0  History of renal cell cancer Z85.528  
 Essential hypertension with goal blood pressure less than 140/90 I10  
 Iritis H20.9  Long-term use of immunosuppressant medication Z79.899  GERD (gastroesophageal reflux disease) K21.9  Seronegative rheumatoid arthritis of multiple sites (HCC) M06.09  
 CKD (chronic kidney disease) stage 2, GFR 60-89 ml/min N18.2  Primary osteoarthritis of both knees M17.0 HISTORY OF PRESENT ILLNESS 
 
Mr. Cornelius Lindo returns for a follow-up. On his last visit, I continued methotrexate to 15 mg weekly with good tolerance. Today, he denies pain, swelling, or stiffness in his elbow. He has morning stiffness in his knees in the morning lasting minutes. He reports intermittent flares in his right wrist, left shoulder. He was given a dose pack by his sister which helped. He has no interval iritis. He plays tennis 3 days a week but has cut back due to the weather. He played recently without limitation. He denies fever, weight loss, blurred vision, vision loss, oral ulcers, ankle swelling, dry cough, dyspnea, nausea, vomiting, dysphagia, abdominal pain, black or bloody stool, fall since last visit, rash, easy bruising and increased thirst. 
 
Mr. Lizzy Headley has continued his medications for arthritis and reports good tolerance without significant side effects. Last toxicity monitoring by blood work was done on 2/11/2019 and did not reveal any significant adverse effects, except creatinine 1.26, eGFR 69. Most recent inflammatory markers from 2/11/2019 revealed a ESR 12 mm/hr (previously 14, 18, 12 mm/hr) and CRP 8.4 mg/L (previously 4.6, N/A, N/A mg/L). The patient has not had any interval hospital admissions, infections, or surgeries. REVIEW OF SYSTEMS A comprehensive review of systems was performed and pertinent results are documented in the HPI, review of systems is otherwise non-contributory. PAST MEDICAL HISTORY He has a past medical history of BPH (benign prostatic hypertrophy) (11/19/2012), Eczema (11/19/2012), Elevated cholesterol (11/19/2012), GERD (gastroesophageal reflux disease) (11/11/2016), History of renal cell cancer (11/19/2012), HTN (hypertension) (11/19/2012), Iritis (11/19/2012), RA (rheumatoid arthritis) ([facility] [address] (11/19/2012), and Renal cell cancer ([facility] (11/19/2012). FAMILY HISTORY His family history includes Anemia in his mother; Arthritis-rheumatoid in his sister; Stroke in his maternal grandmother. SOCIAL HISTORY He reports that  has never smoked. he has never used smokeless tobacco. He reports that he drinks about 4.8 - 5.4 oz of alcohol per week. He reports that he does not use drugs. MEDICATIONS Current Outpatient Medications Medication Sig Dispense Refill  folic acid (FOLVITE) 1 mg tablet TAKE 1 TABLET BY MOUTH EVERY DAY 90 Tab 3  
  [START ON 3/5/2019] methotrexate (RHEUMATREX) 2.5 mg tablet Take 8 Tabs by mouth every Tuesday. 96 Tab 0  
 amLODIPine (NORVASC) 5 mg tablet TAKE 1 TABLET BY MOUTH EVERY DAY 90 Tab 0  
 valsartan-hydroCHLOROthiazide (DIOVAN-HCT) 320-25 mg per tablet TAKE 1 TABLET BY MOUTH DAILY FOR HYPERTENSION. 90 Tab 0  
 augmented betamethasone dipropionate (DIPROLENE-AF) 0.05 % ointment Apply  to affected area two (2) times daily as needed for Skin Irritation or Itching. 45 g 2 ALLERGIES Allergies Allergen Reactions  Codeine Other (comments) Talking out of head PHYSICAL EXAMINATION Visit Vitals /81 Pulse 62 Temp 97.8 °F (36.6 °C) Resp 18 Ht 5' 10\" (1.778 m) Wt 201 lb (91.2 kg) BMI 28.84 kg/m² Body mass index is 28.84 kg/m². General: Patient is alert, oriented x 3, not in acute distress HEENT:  
Sclerae are not injected and appear moist. 
There is no alopecia. Neck is supple Cardiovascular: 
Heart is regular rate and rhythm, no murmurs. Chest: 
Lungs are clear to auscultation bilaterally. No rhonchi, wheezes, or crackles. Extremities: 
Free of clubbing, cyanosis, edema Neurological exam: Muscle strength is full in upper and lower extremities Skin exam: 
 
Psoriasis:     no 
Nail Pitting:     no 
Onycholysis:     no 
Palmoplantar pustulosis:   no 
Acne fulminans:    no 
Acne conglobata:    no 
Hidradenitis Suppurativa:   no 
Dissecting cellulitis of the scalp:  no 
Pilonidal sinus:    no 
Erythema nodosum:    no 
Non-Scarring Alopecia:  no 
Discoid Lupus:   no 
Subacute Cutaneous Lupus:   no 
Heliotrope Rash:   no 
Upper Arm Erythema:   no Shawl Sign:    no 
V-sign:     no 
Holster sign:    no 
Gottron's papules:   no 
Gottron's sign:    no 
Calcinosis:    no 
Raynaud's Phenomenon:  no 
's Hands:   no 
Periungual erythema:   no 
Abnormal Nailfold Capillaries: no 
Livedo Reticularis:   no 
Scalp Erythema:   no 
Rheumatoid Nodules:   No 
 
 Musculoskeletal: A comprehensive musculoskeletal exam was performed for all joints of each upper and lower extremity and assessed for swelling, tenderness and range of motion. Decreased ROM of right elbow with flexion deformity Bilateral knee crepitus without effusion Z-Deformities:   no 
[place] Neck Deformities:  no 
Boutonierre's Deformities:  no 
Ulnar Deviation:   no 
MCP Subluxation:  no 
 
Joint Count 3/1/2019 8/7/2018 5/7/2018 5/11/2017 Patient pain (0-100) 0 20 40 - MHAQ 0.125 0.125 0.375 - Left elbow - Tender - 0 - - Left elbow - Swollen - 1 - - Left wrist- Tender 0 - - - Left wrist- Swollen 0 - - - Right elbow - Tender - - 0 - Right elbow - Swollen - - 1 - Tender Joint Count (Total) 0 0 0 - Swollen Joint Count (Total) 0 1 1 - Physician Assessment (0-10) 0 0 1 3 Patient Assessment (0-10) 3 2 3 2 CDAI Total (calculated) 3 3 5 -  
CDAI - - - 5 DATA REVIEW Laboratory Recent laboratory results were reviewed, summarized, and discussed with the patient. Imaging Musculoskeletal Ultrasound None Radiographs Right Elbow 5/07/2018: no fracture, dislocation, effusion or other acute abnormality. Moderate joint space narrowing, mild spurring of the olecranon process. No bony erosion, no fracture. The bone is normal in mineral content. No joint effusion Chest 9/22/2016: The cardiopericardial silhouette is within normal limits. There is no pleural effusion, pneumothorax or focal consolidation present. Bilateral Hand 3/15/2013: LEFT: Density appears normal. No articular erosions are seen. No abnormal soft tissue swelling is seen. RIGHT: Bone density is normal. No joint erosions are seen. There are minor osteoarthritic changes at the first carpometacarpal junction. No abnormal soft tissue swelling is seen CT Imaging None MR Imaging None DXA None ASSESSMENT AND PLAN This is a follow-up visit for Mr. Raoul Murillo. 1) Seropositive Rheumatoid Arthritis with Iritis. This is chronic and longstanding. He notes intermittent episodes of flares up to 4-5 times per year, such as in his wrists. He denies interval iritis. His CDAI was 3 (previously 3, 5) with 0 tender and 0 swollen, consistent with low disease activity. I asked him to increase his methotrexate incrementally over the next two doses to 20 mg weekly. Labs today. 2) Long Term Use of Immunosuppressants. The patient remains on immunomodulatory medications (methotrexate) and requires frequent toxicity monitoring by blood work. Respective labs were ordered (CBC and CMP), but he was in a hurry and could not draw them today. I provided him with future lab orders. 3) Chronic Kidney Disease Stage 2. The patient's creatinine 1.26 mg/dL and eGFR 69 (previously creatinine 1.37, 1.18 mg/dL and eGFR 63, 75). He avoids NSAIDs. 4) Anemia. His Hct was 30.85. 5) History of Renal Cancer. This was in 2008 and he is on remission. The patient voiced understanding of the aforementioned assessment and plan. Summary of plan was provided in the After Visit Summary patient instructions. TODAY'S ORDERS Orders Placed This Encounter  CBC WITH AUTOMATED DIFF  
 METABOLIC PANEL, COMPREHENSIVE  folic acid (FOLVITE) 1 mg tablet  methotrexate (RHEUMATREX) 2.5 mg tablet Future Appointments Date Time Provider [facility] Jyoti 4/1/2019  9:00 AM Jose Davies MD [address] [place] [address]  
6/4/2019  3:00 PM Sherice Encinas MD [address] [facility], [place] Sachi Rodriguez MD, [facility] Adult Rheumatology Rheumatology Ultrasound Certified Gregory Liebermanilla A [facility], [address] [facility], [address] [place] [address] Phone [phone] Fax [phone]

## 2019-03-01 NOTE — PATIENT INSTRUCTIONS
PLEASE INCREASE YOUR METHOTREXATE 1) THIS TUESDAY TO 7 tablets (17.5 mg) 2) AND THEN NEXT TUESDAY 8 tablets (20 mg) 3) AND THEN CONTINUE 8 TABLETS EVERY TUESDAY CONTINUE with daily Folic Acid 1 mg CONTACT ME IF YOU HAVE ANY SIDE EFFECTS OR HAVE AN INFECTION

## 2019-03-01 NOTE — PROGRESS NOTES
Chief Complaint Patient presents with  Arthritis 1. Have you been to the ER, urgent care clinic since your last visit? Hospitalized since your last visit? No 
 
2. Have you seen or consulted any other health care providers outside of the [address] [place] [address] since your last visit? Include any pap smears or colon screening.  No

## 2019-03-18 ENCOUNTER — OFFICE VISIT (OUTPATIENT)
Dept: FAMILY MEDICINE CLINIC | Age: 64
End: 2019-03-18

## 2019-03-18 VITALS
RESPIRATION RATE: 18 BRPM | SYSTOLIC BLOOD PRESSURE: 114 MMHG | HEIGHT: 70 IN | DIASTOLIC BLOOD PRESSURE: 71 MMHG | BODY MASS INDEX: 27.92 KG/M2 | HEART RATE: 67 BPM | WEIGHT: 195 LBS | TEMPERATURE: 97.7 F

## 2019-03-18 DIAGNOSIS — B34.9 VIRAL INFECTION: Primary | ICD-10-CM

## 2019-03-18 DIAGNOSIS — R05.9 COUGH: ICD-10-CM

## 2019-03-18 RX ORDER — BENZONATATE 200 MG/1
200 CAPSULE ORAL
Qty: 30 CAP | Refills: 0 | Status: SHIPPED | OUTPATIENT
Start: 2019-03-18 | End: 2019-06-04

## 2019-03-18 NOTE — PROGRESS NOTES
HISTORY OF PRESENT ILLNESS  Vginesh Daugherty is a 70 y.o. male. Vignesh Daugherty is here due to lightheadedness, subjective fever, chills, nonproductive cough and night sweats for the past 6 days. He is getting better. Activity makes him worse. Rest makes him better. He has tried OTC cough medications and Giulia Hebron plus which help. Review of Systems   Constitutional: Positive for chills and fever. Negative for malaise/fatigue. HENT: Negative for congestion, ear pain and sore throat. Eyes: Negative for discharge and redness. Respiratory: Positive for cough. Negative for shortness of breath and wheezing. Cardiovascular: Negative for chest pain. Musculoskeletal: Negative for myalgias. Neurological: Negative for headaches. Visit Vitals  /71   Pulse 67   Temp 97.7 °F (36.5 °C) (Oral)   Resp 18   Ht 5' 10\" (1.778 m)   Wt 195 lb (88.5 kg)   BMI 27.98 kg/m²     Physical Exam    ASSESSMENT and PLAN    ICD-10-CM ICD-9-CM    1. Viral infection B34.9 079.99    2. Cough R05 786.2 benzonatate (TESSALON) 200 mg capsule        Viral respiratory like illness  Rest, push fluids  Tessalon prn    Follow-up Disposition:  Return if symptoms worsen or fail to improve. Reviewed plan of care. Patient has provided input and agrees with goals.

## 2019-03-18 NOTE — LETTER
NOTIFICATION RETURN TO WORK / SCHOOL 
 
3/18/2019 8:30 AM 
 
Mr. Matthew Ashton [facility] [id number] To Whom It May Concern: 
 
Matthew Ashton is currently under the care of [address] [place] [address]. He will return to work/school on: 3/20/19, and is excused starting 3/14/19. If there are questions or concerns please have the patient contact our office.  
 
 
 
Sincerely, 
 
 
Nancy Hamomnd MD

## 2019-03-18 NOTE — PROGRESS NOTES
Chief Complaint   Patient presents with    Cough     chills, sweats and feels lightheaded since 03/12/19     1. Have you been to the ER, urgent care clinic since your last visit? Hospitalized since your last visit? No     2. Have you seen or consulted any other health care providers outside of the [address] [place] [address] since your last visit? Include any pap smears or colon screening.  No

## 2019-04-01 ENCOUNTER — OFFICE VISIT (OUTPATIENT)
Dept: FAMILY MEDICINE CLINIC | Age: 64
End: 2019-04-01

## 2019-04-01 VITALS
BODY MASS INDEX: 28.8 KG/M2 | DIASTOLIC BLOOD PRESSURE: 60 MMHG | TEMPERATURE: 98 F | HEIGHT: 70 IN | HEART RATE: 60 BPM | SYSTOLIC BLOOD PRESSURE: 102 MMHG | WEIGHT: 201.2 LBS | OXYGEN SATURATION: 97 % | RESPIRATION RATE: 16 BRPM

## 2019-04-01 DIAGNOSIS — I10 ESSENTIAL HYPERTENSION WITH GOAL BLOOD PRESSURE LESS THAN 140/90: Primary | ICD-10-CM

## 2019-04-01 NOTE — PROGRESS NOTES
Nawaf Morales is a 70 y.o. male    Chief Complaint   Patient presents with    Hypertension     f/u     1. Have you been to the ER, urgent care clinic since your last visit? Hospitalized since your last visit? No    2. Have you seen or consulted any other health care providers outside of the [address] [place] [address] since your last visit? Include any pap smears or colon screening.  No     Visit Vitals  /60 (BP 1 Location: Left arm, BP Patient Position: Sitting)   Pulse 60   Temp 98 °F (36.7 °C) (Oral)   Resp 16   Ht 5' 10\" (1.778 m)   Wt 201 lb 3.2 oz (91.3 kg)   SpO2 97%   BMI 28.87 kg/m²     Health Maintenance Due   Topic Date Due    Shingrix Vaccine Age 50> (1 of 2) 07/12/2003    FOBT Q 1 YEAR AGE 50-75  09/22/2017    GLAUCOMA SCREENING Q2Y  07/12/2018    Pneumococcal 65+ years (1 of 2 - PCV13) 07/12/2018     María Tucker LPN

## 2019-04-01 NOTE — PROGRESS NOTES
HISTORY OF PRESENT ILLNESS  Zeus Padgett is a 70 y.o. male. Hypertension   The history is provided by the patient. This is a chronic problem. The current episode started more than 1 week ago. The problem occurs constantly. The problem has been gradually improving. Pertinent negatives include no chest pain, no abdominal pain, no headaches and no shortness of breath. Nothing aggravates the symptoms. The symptoms are relieved by medications. Treatments tried: Norvasc, Diovan Hct. The treatment provided significant relief. Review of Systems   Constitutional: Negative for weight loss. No weight gain   Eyes: Negative for blurred vision. Respiratory: Negative for shortness of breath. Cardiovascular: Negative for chest pain and leg swelling. Gastrointestinal: Negative for abdominal pain. Neurological: Negative for dizziness, sensory change, speech change, focal weakness and headaches. Visit Vitals  /60 (BP 1 Location: Left arm, BP Patient Position: Sitting)   Pulse 60   Temp 98 °F (36.7 °C) (Oral)   Resp 16   Ht 5' 10\" (1.778 m)   Wt 201 lb 3.2 oz (91.3 kg)   SpO2 97%   BMI 28.87 kg/m²     BP Readings from Last 3 Encounters:   03/18/19 114/71   03/01/19 131/81   01/16/19 112/70     Physical Exam   Constitutional: He is oriented to person, place, and time. He appears well-developed and well-nourished. No distress. Cardiovascular: Normal rate, regular rhythm and normal heart sounds. Exam reveals no gallop and no friction rub. No murmur heard. Pulmonary/Chest: Effort normal and breath sounds normal. No respiratory distress. He has no wheezes. He has no rales. Musculoskeletal: He exhibits no edema. Neurological: He is alert and oriented to person, place, and time. Skin: Skin is warm and dry. He is not diaphoretic. Nursing note and vitals reviewed. ASSESSMENT and PLAN    ICD-10-CM ICD-9-CM    1.  Essential hypertension with goal blood pressure less than 140/90 I10 401.9 Controlled  Continue current plans. Follow-up and Dispositions    · Return in about 6 months (around 10/1/2019) for blood pressure. Reviewed plan of care. Patient has provided input and agrees with goals.

## 2019-04-03 DIAGNOSIS — I10 ESSENTIAL HYPERTENSION WITH GOAL BLOOD PRESSURE LESS THAN 140/90: ICD-10-CM

## 2019-04-03 RX ORDER — AMLODIPINE BESYLATE 5 MG/1
TABLET ORAL
Qty: 90 TAB | Refills: 3 | Status: SHIPPED | OUTPATIENT
Start: 2019-04-03 | End: 2019-10-01

## 2019-04-03 RX ORDER — VALSARTAN AND HYDROCHLOROTHIAZIDE 320; 25 MG/1; MG/1
TABLET, FILM COATED ORAL
Qty: 90 TAB | Refills: 3 | Status: SHIPPED | OUTPATIENT
Start: 2019-04-03 | End: 2019-10-01

## 2019-06-04 ENCOUNTER — OFFICE VISIT (OUTPATIENT)
Dept: RHEUMATOLOGY | Age: 64
End: 2019-06-04

## 2019-06-04 VITALS
HEIGHT: 70 IN | WEIGHT: 195 LBS | HEART RATE: 58 BPM | DIASTOLIC BLOOD PRESSURE: 71 MMHG | BODY MASS INDEX: 27.92 KG/M2 | TEMPERATURE: 97.6 F | RESPIRATION RATE: 18 BRPM | SYSTOLIC BLOOD PRESSURE: 114 MMHG

## 2019-06-04 DIAGNOSIS — M05.79 SEROPOSITIVE RHEUMATOID ARTHRITIS OF MULTIPLE SITES (HCC): Primary | ICD-10-CM

## 2019-06-04 DIAGNOSIS — H20.9 IRITIS: ICD-10-CM

## 2019-06-04 DIAGNOSIS — N18.2 CKD (CHRONIC KIDNEY DISEASE) STAGE 2, GFR 60-89 ML/MIN: ICD-10-CM

## 2019-06-04 DIAGNOSIS — Z79.60 LONG-TERM USE OF IMMUNOSUPPRESSANT MEDICATION: ICD-10-CM

## 2019-06-04 RX ORDER — METHOTREXATE 2.5 MG/1
20 TABLET ORAL
Qty: 96 TAB | Refills: 1 | Status: SHIPPED | OUTPATIENT
Start: 2019-06-04 | End: 2019-12-20 | Stop reason: SDUPTHER

## 2019-06-04 RX ORDER — FOLIC ACID 1 MG/1
TABLET ORAL
Qty: 90 TAB | Refills: 3 | Status: SHIPPED | OUTPATIENT
Start: 2019-06-04 | End: 2019-12-20 | Stop reason: SDUPTHER

## 2019-06-04 NOTE — LETTER
6/4/19 Patient: Marilee Geiger YOB: 1955 Date of Visit: 6/4/2019 Nubia Velasquez MD 
[address] [place] [id number] VIA In Basket Dear Nubia Velasquez MD, Thank you for referring Mr. Wendy Pendleton to [facility] for evaluation. My notes for this consultation are attached. If you have questions, please do not hesitate to call me. I look forward to following your patient along with you.  
 
 
Sincerely, 
 
Esa Gilmore MD

## 2019-06-04 NOTE — PROGRESS NOTES
Chief Complaint   Patient presents with    Arthritis     1. Have you been to the ER, urgent care clinic since your last visit? Hospitalized since your last visit? No    2. Have you seen or consulted any other health care providers outside of the [address] [place] [address] since your last visit? Include any pap smears or colon screening.  No

## 2019-06-04 NOTE — PROGRESS NOTES
REASON FOR VISIT    This is a follow-up visit for Mr. Russ Noguera for Seropositive Rheumatoid Arthritis. Inflammatory arthritis phenotype includes:  Anti-CCP positive: pending  Rheumatoid factor positive: yes (179.6)  Erosive disease: no  Extra-articular manifestations include: iritis    Immunosuppression Screening (N/A):  Quantiferon TB: pending  PPD:  Not performed  Hepatitis B: pending  Hepatitis C: negative (9/22/2016)    Therapy History includes:  Current DMARD therapy include: methotrexate 20 mg every Tuesday  Prior DMARD therapy include: none  Discontinued DMARDs because of inefficacy: None  Discontinued DMARDs because of side effects: None    Immunization History   Administered Date(s) Administered    (RETIRED) Pneumococcal Vaccine (Unspecified Type) 10/10/2007    Influenza Vaccine 10/07/2013       Patient Active Problem List   Diagnosis Code    Seropositive rheumatoid [facility] of multiple sites ([facility] M05.79    Eczema L30.9    Elevated cholesterol E78.00    BPH (benign prostatic hypertrophy) N40.0    History of renal cell cancer Z85.528    Essential hypertension with goal blood pressure less than 140/90 I10    Iritis H20.9    Long-term use of immunosuppressant medication Z79.899    GERD (gastroesophageal reflux disease) K21.9    CKD (chronic kidney disease) stage 2, GFR 60-89 ml/min N18.2    Primary osteoarthritis of both knees M17.0       HISTORY OF PRESENT ILLNESS    Mr. Russ Noguera returns for a follow-up. On his last visit, I increased his methotrexate to 20 mg weekly, due to active disease, which he has taken with good tolerance. He was in a hurry so did not get his labs drawn. Today, he denies pain, swelling, or stiffness in his elbow. Around 2 weeks ago, he had right shoulder pain that he medicated with Tylenol. He is playing tennis without problems. He has no interval iritis. He plays tennis 3 to 4 times a week. He played recently without limitation.       He denies fever, weight loss, blurred vision, vision loss, oral ulcers, ankle swelling, dry cough, dyspnea, nausea, vomiting, dysphagia, abdominal pain, black or bloody stool, fall since last visit, rash, easy bruising and increased thirst.    Mr. Jenetta Mortimer has continued his medications for arthritis and reports good tolerance without significant side effects. Last toxicity monitoring by blood work was done on 2/11/2019 and did not reveal any significant adverse effects, except creatinine 1.26, eGFR 69. Most recent inflammatory markers from 2/11/2019 revealed a ESR 12 mm/hr (previously 14, 18, 12 mm/hr) and CRP 8.4 mg/L (previously 4.6, N/A, N/A mg/L). The patient has not had any interval hospital admissions, infections, or surgeries. REVIEW OF SYSTEMS    A comprehensive review of systems was performed and pertinent results are documented in the HPI, review of systems is otherwise non-contributory. PAST MEDICAL HISTORY    He has a past medical history of BPH (benign prostatic hypertrophy) (11/19/2012), Eczema (11/19/2012), Elevated cholesterol (11/19/2012), GERD (gastroesophageal reflux disease) (11/11/2016), History of renal cell cancer (11/19/2012), HTN (hypertension) (11/19/2012), Iritis (11/19/2012), RA (rheumatoid arthritis) ([facility] (11/19/2012), and Ren[facility] cell cancer ([facility] (11/19/2012). FAMILY HISTORY    His family history includes Anemia in his mother; Arthritis-rheumatoid in his sister; Stroke in his maternal grandmother. SOCIAL HISTORY    He reports that he has never smoked. He has never used smokeless tobacco. He reports that he drinks about 4.8 - 5.4 oz of alcohol per week. He reports that he does not use drugs. MEDICATIONS    Current Outpatient Medications   Medication Sig Dispense Refill    methotrexate (RHEUMATREX) 2.5 mg tablet Take 8 Tabs by mouth every Tuesday.  96 Tab 1    folic acid (FOLVITE) 1 mg tablet TAKE 1 TABLET BY MOUTH EVERY DAY 90 Tab 3    amLODIPine (NORVASC) 5 mg tablet TAKE 1 TABLET BY MOUTH EVERY DAY 90 Tab 3    valsartan-hydroCHLOROthiazide (DIOVAN-HCT) 320-25 mg per tablet TAKE 1 TABLET BY MOUTH DAILY FOR HYPERTENSION. 90 Tab 3    augmented betamethasone dipropionate (DIPROLENE-AF) 0.05 % ointment Apply  to affected area two (2) times daily as needed for Skin Irritation or Itching. 45 g 2        ALLERGIES    Allergies   Allergen Reactions    Codeine Other (comments)     Talking out of head       PHYSICAL EXAMINATION    Visit Vitals  /71   Pulse (!) 58   Temp 97.6 °F (36.4 °C)   Resp 18   Ht 5' 10\" (1.778 m)   Wt 195 lb (88.5 kg)   BMI 27.98 kg/m²     Body mass index is 27.98 kg/m². General: Patient is alert, oriented x 3, not in acute distress    HEENT:   Sclerae are not injected and appear moist.  There is no alopecia. Neck is supple     Cardiovascular:  Heart is regular rate and rhythm, no murmurs. Chest:  Lungs are clear to auscultation bilaterally. No rhonchi, wheezes, or crackles.     Extremities:  Free of clubbing, cyanosis, edema    Neurological exam:  Muscle strength is full in upper and lower extremities     Skin exam:    Psoriasis:     no  Nail Pitting:     no  Onycholysis:     no  Palmoplantar pustulosis:   no  Acne fulminans:    no  Acne conglobata:    no  Hidradenitis Suppurativa:   no  Dissecting cellulitis of the scalp:  no  Pilonidal sinus:    no  Erythema nodosum:    no  Non-Scarring Alopecia:  no  Discoid Lupus:   no  Subacute Cutaneous Lupus:   no  Heliotrope Rash:   no  Upper Arm Erythema:   no  Shawl Sign:    no  V-sign:     no  Holster sign:    no  Gottron's papules:   no  Gottron's sign:    no  Calcinosis:    no  Raynaud's Phenomenon:  no  's Hands:   no  Periungual erythema:   no  Abnormal Nailfold Capillaries: no  Livedo Reticularis:   no  Scalp Erythema:   no  Rheumatoid Nodules:   No    Musculoskeletal:  A comprehensive musculoskeletal exam was performed for all joints of each upper and lower extremity and assessed for swelling, tenderness and range of motion. Decreased ROM of right elbow with flexion deformity  Bilateral knee crepitus without effusion    Z-Deformities:   no  Blanding Neck Deformities:  no  Boutonierre's Deformities:  no  Ulnar Deviation:   no  MCP Subluxation:  no    Joint Count 6/4/2019 3/1/2019 8/7/2018 5/7/2018 5/11/2017   Patient pain (0-100) 25 0 20 40 -   MHAQ 0 0.125 0.125 0.375 -   Left elbow - Tender - - 0 - -   Left elbow - Swollen - - 1 - -   Left wrist- Tender - 0 - - -   Left wrist- Swollen - 0 - - -   Right elbow - Tender 0 - - 0 -   Right elbow - Swollen 1 - - 1 -   Right 3rd MCP - Tender 0 - - - -   Right 3rd MCP - Swollen 1 - - - -   Tender Joint Count (Total) 0 0 0 0 -   Swollen Joint Count (Total) 2 0 1 1 -   Physician Assessment (0-10) 1 0 0 1 3   Patient Assessment (0-10) 2.5 3 2 3 2   CDAI Total (calculated) 5.5 3 3 5 -   CDAI - - - - 5     DATA REVIEW    Laboratory     Recent laboratory results were reviewed, summarized, and discussed with the patient. Imaging    Musculoskeletal Ultrasound    None    Radiographs    Right Elbow 5/07/2018: no fracture, dislocation, effusion or other acute abnormality. Moderate joint space narrowing, mild spurring of the olecranon process. No bony erosion, no fracture. The bone is normal in mineral content. No joint effusion    Chest 9/22/2016: The cardiopericardial silhouette is within normal limits. There is no pleural effusion, pneumothorax or focal consolidation present. Bilateral Hand 3/15/2013: LEFT: Density appears normal. No articular erosions are seen. No abnormal soft tissue swelling is seen. RIGHT: Bone density is normal. No joint erosions are seen. There are minor osteoarthritic changes at the first carpometacarpal junction. No abnormal soft tissue swelling is seen    CT Imaging    None    MR Imaging    None    DXA     None    ASSESSMENT AND PLAN    This is a follow-up visit for Mr. Jessica Vail. 1) Seropositive Rheumatoid Arthritis with Iritis.  This is chronic and longstanding. He is maintained on methotrexate 20 mg every Tuesday with good tolerance. I increased his dose on his last visit from 15 mg to 20 mg but he did not notice any worsening. He continues to play tennis regularly without limitations. He denies interval iritis. His CDAI was 5.5 (previously 3, 3, 5) with 0 tender and 2 swollen, consistent with low disease activity. He has chronic synovial hypertrophy. Labs today (not drawn at last visit) and in 3 months. Follow up in 6 months. I refilled his methotrexate. 2) Long Term Use of Immunosuppressants. The patient remains on immunomodulatory medications (methotrexate) and requires frequent toxicity monitoring by blood work. Respective labs were ordered (CBC and CMP), but he was in a hurry and could not draw them today. I provided him with future lab orders. 3) Chronic Kidney Disease Stage 2. The patient's creatinine 1.26 mg/dL and eGFR 69 (previously creatinine 1.37, 1.18 mg/dL and eGFR 63, 75). He avoids NSAIDs. Pending labs drawn today. 4) Anemia. His Hct was 30.85. Pending labs drawn today. 5) History of Renal Cancer. This was in 2008 and he is on remission. The patient voiced understanding of the aforementioned assessment and plan. Summary of plan was provided in the After Visit Summary patient instructions.      TODAY'S ORDERS    Orders Placed This Encounter    QUANTIFERON-TB GOLD PLUS    CHRONIC HEPATITIS PANEL    C REACTIVE PROTEIN, QT    CBC WITH AUTOMATED DIFF    METABOLIC PANEL, COMPREHENSIVE    SED RATE (ESR)    methotrexate (RHEUMATREX) 2.5 mg tablet    folic acid (FOLVITE) 1 mg tablet     Future Appointments   Date Time Provider Favio Beari   10/1/2019  9:30 AM Daryl Ho  [facility]   12/4/2019  3:00 PM MD Caron Kim MD, [address] [facility]    Adult Rheumatology   Rheumatology Ultrasound Certified  Gregory León  [place] [address] [address], [address] [facility], [address] [place] [address]   Phone [phone]  Fax [phone]

## 2019-06-07 LAB
COMMENT, 144067: NORMAL
GAMMA INTERFERON BACKGROUND BLD IA-ACNC: 0.01 IU/ML
HBV CORE AB SERPL QL IA: POSITIVE
HBV CORE IGM SERPL QL IA: NEGATIVE
HBV E AB SERPL QL IA: POSITIVE
HBV E AG SERPL QL IA: NEGATIVE
HBV SURFACE AB SER QL: REACTIVE
HBV SURFACE AG SERPL QL IA: NEGATIVE
HCV AB S/CO SERPL IA: <0.1 S/CO RATIO (ref 0–0.9)
M TB IFN-G BLD-IMP: NEGATIVE
M TB IFN-G CD4+ BCKGRND COR BLD-ACNC: 0.02 IU/ML
MITOGEN IGNF BLD-ACNC: 6.81 IU/ML
QUANTIFERON INCUBATION, QF1T: NORMAL
QUANTIFERON TB2 AG: 0.01 IU/ML
SERVICE CMNT-IMP: NORMAL

## 2019-06-09 NOTE — PROGRESS NOTES
The results were reviewed. History of previous exposure and immunity to hepatitis B. All remaining labs are normal/negative.

## 2019-06-18 ENCOUNTER — TELEPHONE (OUTPATIENT)
Dept: RHEUMATOLOGY | Age: 64
End: 2019-06-18

## 2019-06-18 NOTE — TELEPHONE ENCOUNTER
Spoke with Bri at [address] [facility] [place] and he had some questions regarding his hepatitis results. I provided the information to him so that he could update his records. He had previous exposure to hepatitis but has immunity to hepatitis B. He stated an understanding.

## 2019-10-01 ENCOUNTER — OFFICE VISIT (OUTPATIENT)
Dept: FAMILY MEDICINE CLINIC | Age: 64
End: 2019-10-01

## 2019-10-01 VITALS
DIASTOLIC BLOOD PRESSURE: 79 MMHG | RESPIRATION RATE: 18 BRPM | BODY MASS INDEX: 27.77 KG/M2 | WEIGHT: 194 LBS | HEIGHT: 70 IN | SYSTOLIC BLOOD PRESSURE: 121 MMHG | HEART RATE: 59 BPM | TEMPERATURE: 97.6 F

## 2019-10-01 DIAGNOSIS — L30.9 ECZEMA, UNSPECIFIED TYPE: ICD-10-CM

## 2019-10-01 DIAGNOSIS — Z12.11 ENCOUNTER FOR FIT (FECAL IMMUNOCHEMICAL TEST) SCREENING: ICD-10-CM

## 2019-10-01 DIAGNOSIS — I10 ESSENTIAL HYPERTENSION WITH GOAL BLOOD PRESSURE LESS THAN 140/90: Primary | ICD-10-CM

## 2019-10-01 RX ORDER — BETAMETHASONE DIPROPIONATE 0.5 MG/G
OINTMENT TOPICAL
Qty: 45 G | Refills: 2 | Status: SHIPPED | OUTPATIENT
Start: 2019-10-01 | End: 2021-04-26 | Stop reason: SDUPTHER

## 2019-10-01 RX ORDER — VALSARTAN AND HYDROCHLOROTHIAZIDE 320; 25 MG/1; MG/1
1 TABLET, FILM COATED ORAL DAILY
Qty: 90 TAB | Refills: 3 | Status: SHIPPED | OUTPATIENT
Start: 2019-10-01 | End: 2020-01-14 | Stop reason: ALTCHOICE

## 2019-10-01 RX ORDER — AMLODIPINE BESYLATE 5 MG/1
TABLET ORAL
Qty: 90 TAB | Refills: 3 | Status: SHIPPED | OUTPATIENT
Start: 2019-10-01 | End: 2020-03-31 | Stop reason: SDUPTHER

## 2019-10-01 NOTE — PROGRESS NOTES
HISTORY OF PRESENT ILLNESS  Cody Borden is a 75 y.o. male. Patient presents with:  Hypertension: 6 mo f/u    Cody Borden is here to follow up on their HTN. This is a chronic problem. The problem occurs constantly and is stable. The symptoms are relieved by Norvasc, Diovan Hct, which is/are working well. His eczema has been acting up. He is due for FOBT testing. Review of Systems   Constitutional: Negative for weight loss. No weight gain   Eyes: Negative for blurred vision. Respiratory: Negative for shortness of breath. Cardiovascular: Negative for chest pain and leg swelling. Gastrointestinal: Negative for abdominal pain. Neurological: Negative for dizziness, sensory change, speech change, focal weakness and headaches. Visit Vitals  /79   Pulse (!) 59   Temp 97.6 °F (36.4 °C) (Oral)   Resp 18   Ht 5' 10\" (1.778 m)   Wt 194 lb (88 kg)   BMI 27.84 kg/m²     BP Readings from Last 3 Encounters:   06/04/19 114/71   04/01/19 102/60   03/18/19 114/71     Physical Exam   Constitutional: He is oriented to person, place, and time. He appears well-developed and well-nourished. No distress. Cardiovascular: Normal rate, regular rhythm and normal heart sounds. Exam reveals no gallop and no friction rub. No murmur heard. Pulmonary/Chest: Effort normal and breath sounds normal. No respiratory distress. He has no wheezes. He has no rales. Musculoskeletal: He exhibits no edema. Neurological: He is alert and oriented to person, place, and time. Skin: Skin is warm and dry. He is not diaphoretic. Nursing note and vitals reviewed. ASSESSMENT and PLAN    ICD-10-CM ICD-9-CM    1. Essential hypertension with goal blood pressure less than 140/90 T04 776.8 METABOLIC PANEL, BASIC      valsartan-hydroCHLOROthiazide (DIOVAN-HCT) 320-25 mg per tablet      amLODIPine (NORVASC) 5 mg tablet   2.  Eczema, unspecified type L30.9 692.9 augmented betamethasone dipropionate (DIPROLENE-AF) 0.05 % ointment   3. Encounter for FIT (fecal immunochemical test) screening Z12.11 V76.51 OCCULT BLOOD IMMUNOASSAY,DIAGNOSTIC        Blood pressure controlled  Eczema worse  Continue current plans. Lukewarm short showers or baths with Dove soap, mild lotion after toweling off  Labs per orders. Refills per orders    Follow-up and Dispositions    · Return in about 6 months (around 4/1/2020) for blood pressure. Reviewed plan of care. Patient has provided input and agrees with goals.

## 2019-10-01 NOTE — PROGRESS NOTES
Chief Complaint   Patient presents with    Hypertension     6 mo f/u     1. Have you been to the ER, urgent care clinic since your last visit? Hospitalized since your last visit? No    2. Have you seen or consulted any other health care providers outside of the [address] [place] [address] since your last visit? Include any pap smears or colon screening.  No

## 2019-10-02 LAB
BUN SERPL-MCNC: 27 MG/DL (ref 8–27)
BUN/CREAT SERPL: 17 (ref 10–24)
CALCIUM SERPL-MCNC: 9.3 MG/DL (ref 8.6–10.2)
CHLORIDE SERPL-SCNC: 102 MMOL/L (ref 96–106)
CO2 SERPL-SCNC: 25 MMOL/L (ref 20–29)
CREAT SERPL-MCNC: 1.55 MG/DL (ref 0.76–1.27)
GLUCOSE SERPL-MCNC: 91 MG/DL (ref 65–99)
INTERPRETATION: NORMAL
POTASSIUM SERPL-SCNC: 4.4 MMOL/L (ref 3.5–5.2)
SODIUM SERPL-SCNC: 139 MMOL/L (ref 134–144)

## 2019-10-05 DIAGNOSIS — I10 ESSENTIAL HYPERTENSION WITH GOAL BLOOD PRESSURE LESS THAN 140/90: Primary | ICD-10-CM

## 2019-12-10 ENCOUNTER — TELEPHONE (OUTPATIENT)
Dept: RHEUMATOLOGY | Age: 64
End: 2019-12-10

## 2019-12-16 ENCOUNTER — TELEPHONE (OUTPATIENT)
Dept: RHEUMATOLOGY | Age: 64
End: 2019-12-16

## 2019-12-16 DIAGNOSIS — M05.79 SEROPOSITIVE RHEUMATOID ARTHRITIS OF MULTIPLE SITES (HCC): Primary | ICD-10-CM

## 2019-12-16 NOTE — TELEPHONE ENCOUNTER
Spoke with pt and asked him to come and have labs drawn so that we can send in more methotrexate for him to make it until his appt in January. He stated an understanding and will come tomorrow for labs.

## 2019-12-16 NOTE — TELEPHONE ENCOUNTER
Patient is calling due to he would like a supply of Methotrexate before his next appointment on 1/14/2020 due to he cannot fill this RX till 2020. He is concerned he will have a flare-up. His phone is [phone].

## 2019-12-18 LAB
ALBUMIN SERPL-MCNC: 3.5 G/DL (ref 3.6–4.8)
ALBUMIN/GLOB SERPL: 1 {RATIO} (ref 1.2–2.2)
ALP SERPL-CCNC: 91 IU/L (ref 39–117)
ALT SERPL-CCNC: 32 IU/L (ref 0–44)
AST SERPL-CCNC: 23 IU/L (ref 0–40)
BASOPHILS # BLD AUTO: 0 X10E3/UL (ref 0–0.2)
BASOPHILS NFR BLD AUTO: 1 %
BILIRUB SERPL-MCNC: 0.4 MG/DL (ref 0–1.2)
BUN SERPL-MCNC: 17 MG/DL (ref 8–27)
BUN/CREAT SERPL: 12 (ref 10–24)
CALCIUM SERPL-MCNC: 8.7 MG/DL (ref 8.6–10.2)
CHLORIDE SERPL-SCNC: 102 MMOL/L (ref 96–106)
CO2 SERPL-SCNC: 24 MMOL/L (ref 20–29)
CREAT SERPL-MCNC: 1.38 MG/DL (ref 0.76–1.27)
CRP SERPL-MCNC: 8 MG/L (ref 0–10)
EOSINOPHIL # BLD AUTO: 0.1 X10E3/UL (ref 0–0.4)
EOSINOPHIL NFR BLD AUTO: 6 %
ERYTHROCYTE [DISTWIDTH] IN BLOOD BY AUTOMATED COUNT: 14.7 % (ref 12.3–15.4)
ERYTHROCYTE [SEDIMENTATION RATE] IN BLOOD BY WESTERGREN METHOD: 34 MM/HR (ref 0–30)
GLOBULIN SER CALC-MCNC: 3.5 G/DL (ref 1.5–4.5)
GLUCOSE SERPL-MCNC: 100 MG/DL (ref 65–99)
HCT VFR BLD AUTO: 31.4 % (ref 37.5–51)
HGB BLD-MCNC: 10.5 G/DL (ref 13–17.7)
IMM GRANULOCYTES # BLD AUTO: 0 X10E3/UL (ref 0–0.1)
IMM GRANULOCYTES NFR BLD AUTO: 1 %
LYMPHOCYTES # BLD AUTO: 0.9 X10E3/UL (ref 0.7–3.1)
LYMPHOCYTES NFR BLD AUTO: 41 %
MCH RBC QN AUTO: 33.7 PG (ref 26.6–33)
MCHC RBC AUTO-ENTMCNC: 33.4 G/DL (ref 31.5–35.7)
MCV RBC AUTO: 101 FL (ref 79–97)
MONOCYTES # BLD AUTO: 0.2 X10E3/UL (ref 0.1–0.9)
MONOCYTES NFR BLD AUTO: 11 %
NEUTROPHILS # BLD AUTO: 0.9 X10E3/UL (ref 1.4–7)
NEUTROPHILS NFR BLD AUTO: 40 %
PLATELET # BLD AUTO: 132 X10E3/UL (ref 150–450)
POTASSIUM SERPL-SCNC: 3.8 MMOL/L (ref 3.5–5.2)
PROT SERPL-MCNC: 7 G/DL (ref 6–8.5)
RBC # BLD AUTO: 3.12 X10E6/UL (ref 4.14–5.8)
SODIUM SERPL-SCNC: 141 MMOL/L (ref 134–144)
WBC # BLD AUTO: 2.2 X10E3/UL (ref 3.4–10.8)

## 2019-12-20 DIAGNOSIS — M05.79 SEROPOSITIVE RHEUMATOID ARTHRITIS OF MULTIPLE SITES (HCC): ICD-10-CM

## 2019-12-20 DIAGNOSIS — Z79.60 LONG-TERM USE OF IMMUNOSUPPRESSANT MEDICATION: ICD-10-CM

## 2019-12-20 RX ORDER — METHOTREXATE 2.5 MG/1
15 TABLET ORAL
Qty: 72 TAB | Refills: 0 | Status: SHIPPED | OUTPATIENT
Start: 2019-12-20 | End: 2020-01-14 | Stop reason: SDUPTHER

## 2019-12-20 RX ORDER — METHOTREXATE 2.5 MG/1
15 TABLET ORAL
Qty: 72 TAB | Refills: 0 | Status: CANCELLED | OUTPATIENT
Start: 2019-12-20

## 2019-12-20 RX ORDER — FOLIC ACID 1 MG/1
TABLET ORAL
Qty: 90 TAB | Refills: 3 | Status: SHIPPED | OUTPATIENT
Start: 2019-12-20 | End: 2020-05-06 | Stop reason: SDUPTHER

## 2019-12-20 RX ORDER — FOLIC ACID 1 MG/1
TABLET ORAL
Qty: 90 TAB | Refills: 3 | Status: CANCELLED | OUTPATIENT
Start: 2019-12-20

## 2020-01-02 ENCOUNTER — TELEPHONE (OUTPATIENT)
Dept: RHEUMATOLOGY | Age: 65
End: 2020-01-02

## 2020-01-02 NOTE — TELEPHONE ENCOUNTER
Patient is calling due to he is having a flare-up in his big toe and he is requesting Prednisone. His phone is [phone].

## 2020-01-05 DIAGNOSIS — I10 ESSENTIAL HYPERTENSION WITH GOAL BLOOD PRESSURE LESS THAN 140/90: ICD-10-CM

## 2020-01-06 RX ORDER — PREDNISONE 5 MG/1
TABLET ORAL
Qty: 91 TAB | Refills: 0 | Status: SHIPPED | OUTPATIENT
Start: 2020-01-06 | End: 2020-01-14

## 2020-01-14 ENCOUNTER — OFFICE VISIT (OUTPATIENT)
Dept: RHEUMATOLOGY | Age: 65
End: 2020-01-14

## 2020-01-14 VITALS
HEART RATE: 65 BPM | SYSTOLIC BLOOD PRESSURE: 128 MMHG | DIASTOLIC BLOOD PRESSURE: 81 MMHG | RESPIRATION RATE: 18 BRPM | BODY MASS INDEX: 28.77 KG/M2 | WEIGHT: 201 LBS | HEIGHT: 70 IN | TEMPERATURE: 98 F

## 2020-01-14 DIAGNOSIS — M10.372 ACUTE GOUT DUE TO RENAL IMPAIRMENT INVOLVING LEFT FOOT: ICD-10-CM

## 2020-01-14 DIAGNOSIS — N18.2 CKD (CHRONIC KIDNEY DISEASE) STAGE 2, GFR 60-89 ML/MIN: ICD-10-CM

## 2020-01-14 DIAGNOSIS — M05.79 SEROPOSITIVE RHEUMATOID ARTHRITIS OF MULTIPLE SITES (HCC): Primary | ICD-10-CM

## 2020-01-14 DIAGNOSIS — I10 ESSENTIAL HYPERTENSION: ICD-10-CM

## 2020-01-14 DIAGNOSIS — H20.9 IRITIS: ICD-10-CM

## 2020-01-14 RX ORDER — LOSARTAN POTASSIUM 100 MG/1
100 TABLET ORAL DAILY
Qty: 90 TAB | Refills: 3 | Status: SHIPPED | OUTPATIENT
Start: 2020-01-14 | End: 2020-03-31 | Stop reason: SDUPTHER

## 2020-01-14 RX ORDER — METHOTREXATE 2.5 MG/1
15 TABLET ORAL
Qty: 72 TAB | Refills: 0 | Status: SHIPPED | OUTPATIENT
Start: 2020-01-14 | End: 2020-04-15

## 2020-01-14 NOTE — PROGRESS NOTES
Chief Complaint   Patient presents with    Arthritis     1. Have you been to the ER, urgent care clinic since your last visit? Hospitalized since your last visit? No    2. Have you seen or consulted any other health care providers outside of the [address] [place] [address] since your last visit? Include any pap smears or colon screening.  No

## 2020-01-14 NOTE — PROGRESS NOTES
REASON FOR VISIT    This is a follow-up visit for Mr. Karoline Copeland for    ICD-10-CM   1. Seropositive rheumatoid arthritis of multiple sites ([facility] M05.79   2. Iritis H20.9     Inflammatory arthritis phenotype includes:  Anti-CCP positive: pending  Rheumatoid factor positive: yes (179.6)  Erosive disease: no  Extra-articular manifestations include: iritis/uveitis    Immunosuppression Screening (6/04/2019): Quantiferon TB: negative  PPD:  Not performed  Hepatitis B: negative  Hepatitis C: negative     Therapy History includes:  Current DMARD therapy include: methotrexate 15 mg every Tuesday  Prior DMARD therapy include: methotrexate 20 mg every Tuesday  Discontinued DMARDs because of inefficacy: None  Discontinued DMARDs because of side effects: methotrexate 20 mg weekly (mild pancytopenia (WBC, platelets))    Immunization History   Administered Date(s) Administered    Influenza Vaccine 10/07/2013    Pneumococcal Polysaccharide (PPSV-23) 10/10/2007       Patient Active Problem List   Diagnosis Code    Seropositive rheumatoid arthritis of multiple sites ([facility] M05.79    Eczema L30.9    Elevated cholesterol E78.00    BPH (benign prostatic hypertrophy) N40.0    History of renal cell cancer Z85.528    Essential hypertension with goal blood pressure less than 140/90 I10    Iritis H20.9    Long-term use of immunosuppressant medication Z79.899    GERD (gastroesophageal reflux disease) K21.9    CKD (chronic kidney disease) stage 2, GFR 60-89 ml/min N18.2    Primary osteoarthritis of both knees M17.0     HISTORY OF PRESENT ILLNESS    Mr. Karoline Copeland returns for a follow-up. On his last visit on 6/04/2019, I continued methotrexate to 20 mg weekly, due to active disease, which he had taken with good tolerance. He no showed for his follow up appointment but had labs drawn on 12/17/2019 which showed mild pancytopenia (WBC, Hct, Platelets) and slight worsening in his renal function.  I therefore lowered his methotrexate dose to 15 mg weekly. He called on 1/02/2020 complaining of a flare in his acute big left toe pain that was red hot swollen and could not bear weight or place sheets on it and then the next day had a flare of his Rheumatoid Arthritis in his hands, knees, and ankles, so a prednisone taper was prescribed, which resolved it after a a week and then stopped. He reports having a history of gout about 10 years ago. Today, he denies pain, swelling, or stiffness in his joints. He noted a pain in his left iritis that resolved within a day (pain, photosensitivity). His last iritis flare was more than several years ago. He plays tennis 3 to 4 times a week. He played recently without limitation this past Sunday    He denies fever, weight loss, blurred vision, vision loss, oral ulcers, ankle swelling, dry cough, dyspnea, nausea, vomiting, dysphagia, abdominal pain, black or bloody stool, fall since last visit, rash, easy bruising and increased thirst.    Mr. Henry Farah has continued his medications for arthritis and reports good tolerance without significant side effects. Last toxicity monitoring by blood work was done on 12/17/2019 and did not reveal any significant adverse effects, except WBC 2.2, Hct 31.4%, platelets 141,288, creatinine 1.38, eGFR 61. Most recent inflammatory markers from 12/17/2019 revealed a ESR 34 mm/hr and CRP 8 mg/L. The patient has not had any interval hospital admissions, infections, or surgeries. REVIEW OF SYSTEMS    A comprehensive review of systems was performed and pertinent results are documented in the HPI, review of systems is otherwise non-contributory.     PAST MEDICAL HISTORY    He has a past medical history of BPH (benign prostatic hypertrophy) (11/19/2012), Eczema (11/19/2012), Elevated cholesterol (11/19/2012), GERD (gastroesophageal reflux disease) (11/11/2016), History of renal cell cancer (11/19/2012), HTN (hypertension) (11/19/2012), Iritis (11/19/2012), RA (rheumatoid arthritis) ([facility].) (11/19/2012), and Renal cell cancer ([facility].) (11/19/2012). FAMILY HISTORY    His family history includes Anemia in his mother; Arthritis-rheumatoid in his sister; Stroke in his maternal grandmother. SOCIAL HISTORY    He reports that he has never smoked. He has never used smokeless tobacco. He reports current alcohol use of about 8.0 - 9.0 standard drinks of alcohol per week. He reports that he does not use drugs. MEDICATIONS    Current Outpatient Medications   Medication Sig Dispense Refill    methotrexate (RHEUMATREX) 2.5 mg tablet Take 6 Tabs by mouth every Tuesday. 72 Tab 0    losartan (COZAAR) 100 mg tablet Take 1 Tab by mouth daily. 90 Tab 3    folic acid (FOLVITE) 1 mg tablet TAKE 1 TABLET BY MOUTH EVERY DAY 90 Tab 3    augmented betamethasone dipropionate (DIPROLENE-AF) 0.05 % ointment Apply  to affected area two (2) times daily as needed for Skin Irritation or Itching. 45 g 2    amLODIPine (NORVASC) 5 mg tablet TAKE 1 TABLET BY MOUTH EVERY DAY 90 Tab 3        ALLERGIES    Allergies   Allergen Reactions    Codeine Other (comments)     Talking out of head     PHYSICAL EXAMINATION    Visit Vitals  /81   Pulse 65   Temp 98 °F (36.7 °C)   Resp 18   Ht 5' 10\" (1.778 m)   Wt 201 lb (91.2 kg)   BMI 28.84 kg/m²     Body mass index is 28.84 kg/m². General: Patient is alert, oriented x 3, not in acute distress    HEENT:   Sclerae are not injected and appear moist.  There is no alopecia. Neck is supple     Cardiovascular:  Heart is regular rate and rhythm, no murmurs. Chest:  Lungs are clear to auscultation bilaterally. No rhonchi, wheezes, or crackles.     Extremities:  Free of clubbing, cyanosis, edema    Neurological exam:  Muscle strength is full in upper and lower extremities     Skin exam:    Psoriasis:     no  Nail Pitting:     no  Onycholysis:     no  Palmoplantar pustulosis:   no  Acne fulminans:    no  Acne conglobata:    no  Hidradenitis Suppurativa:   no  Dissecting cellulitis of the scalp:  no  Pilonidal sinus:    no  Erythema nodosum:    no  Non-Scarring Alopecia:  no  Discoid Lupus:   no  Subacute Cutaneous Lupus:   no  Heliotrope Rash:   no  Upper Arm Erythema:   no  Shawl Sign:    no  V-sign:     no  Holster sign:    no  Gottron's papules:   no  Gottron's sign:    no  Calcinosis:    no  Raynaud's Phenomenon:  no  's Hands:   no  Periungual erythema:   no  Abnormal Nailfold Capillaries: no  Livedo Reticularis:   no  Scalp Erythema:   no  Rheumatoid Nodules:   No    Musculoskeletal:  A comprehensive musculoskeletal exam was performed for all joints of each upper and lower extremity and assessed for swelling, tenderness and range of motion. Decreased ROM of right elbow with flexion deformity  Bilateral knee crepitus without effusion  Left 1st MTP tenderness    Z-Deformities:   no  S[place] Neck Deformities:  no  Boutonierre's Deformities:  no  Ulnar Deviation:   no  MCP Subluxation:  no    Joint Count 1/14/2020 6/4/2019 3/1/2019 8/7/2018 5/7/2018 5/11/2017   Patient pain (0-100) 25 25 0 20 40 -   MHAQ 0.875 0 0.125 0.125 0.375 -   Left elbow - Tender - - - 0 - -   Left elbow - Swollen - - - 1 - -   Left wrist- Tender - - 0 - - -   Left wrist- Swollen - - 0 - - -   Right elbow - Tender 0 0 - - 0 -   Right elbow - Swollen 1 1 - - 1 -   Right 3rd MCP - Tender - 0 - - - -   Right 3rd MCP - Swollen - 1 - - - -   Tender Joint Count (Total) 0 0 0 0 0 -   Swollen Joint Count (Total) 1 2 0 1 1 -   Physician Assessment (0-10) 0 1 0 0 1 3   Patient Assessment (0-10) 2.5 2.5 3 2 3 2   CDAI Total (calculated) 3.5 5.5 3 3 5 -   CDAI - - - - - 5     DATA REVIEW    Laboratory     Recent laboratory results were reviewed, summarized, and discussed with the patient. Imaging    Musculoskeletal Ultrasound    None    Radiographs    Right Elbow 5/07/2018: no fracture, dislocation, effusion or other acute abnormality.   Moderate joint space narrowing, mild spurring of the olecranon process. No bony erosion, no fracture. The bone is normal in mineral content. No joint effusion    Chest 9/22/2016: The cardiopericardial silhouette is within normal limits. There is no pleural effusion, pneumothorax or focal consolidation present. Bilateral Hand 3/15/2013: LEFT: Density appears normal. No articular erosions are seen. No abnormal soft tissue swelling is seen. RIGHT: Bone density is normal. No joint erosions are seen. There are minor osteoarthritic changes at the first carpometacarpal junction. No abnormal soft tissue swelling is seen    CT Imaging    None    MR Imaging    None    DXA     None    ASSESSMENT AND PLAN    This is a follow-up visit for Mr. Rosa Puentes. 1) Seropositive Rheumatoid Arthritis with Iritis. This is chronic and longstanding. He is maintained on methotrexate 20 mg every Tuesday with good tolerance. Labs on 12/17/2019 noted leukopenia and mild thrombocytopenia so I lowered his methotrexate to 15 weekly. He then developed a gouty flare in left big toe and then an Rheumatoid Arthritis flare in his ankles, knees, and hands, which was treated with prednisone. He stopped prednisone around 7 days into his taper due to feeling well. He played tennis two days ago without issues. Today, he feels well. He did not a small possible flare in his left iritis which resolved within a day (pain and photosensitivity). He continues to play tennis regularly without limitations. He denies interval iritis. His CDAI was 3.5 (previously 5.5, 3, 3, 5) with 0 tender and 1 swollen, consistent with low disease activity after prednisone. I ordered labs today and explained that if his cytopenias persist, I recommend initiation of Humira. He declined doing so. 2) Long Term Use of Immunosuppressants. The patient remains on immunomodulatory medications (methotrexate) and requires frequent toxicity monitoring by blood work. Respective labs were ordered (CBC and CMP).     3) Chronic Kidney Disease Stage 2. The patient's creatinine 1.37 mg/dL and eGFR 61. He has one kidney. He avoids NSAIDs. Repeat labs ordered today. 4) Chronic Anemia. His Hct was 31.4%. this is likely from chronic kidney disease. Repeat labs ordered today. 5) Acute Gout of Left Toe secondary Renal Impairment. This resolved with prednisone. I discontinued Diovan-HCT due to HCTZ inducing hyperuricemia and started Losartan 100 mg daily which has uricosuric properties. He declines initiation of urate lowering therapy at this time. He flared about 10-12 years ago. 6) History of Renal Cancer. This was in 2008 and he is on remission. He has one kidney left. 7) Pancytopenia. (WBC and platelets). This is an interval development noted on most recent labs. I lowered his methotrexate to 15 mg and will repeat today. The patient voiced understanding of the aforementioned assessment and plan. Summary of plan was provided in the After Visit Summary patient instructions.      TODAY'S ORDERS    Orders Placed This Encounter    METHOTREXATE POLYGLUTAMATES    CBC WITH AUTOMATED DIFF    METABOLIC PANEL, COMPREHENSIVE    C REACTIVE PROTEIN, QT    SED RATE (ESR)    URIC ACID    CYCLIC CITRUL PEPTIDE AB, IGG    methotrexate (RHEUMATREX) 2.5 mg tablet    losartan (COZAAR) 100 mg tablet     Future Appointments   Date Time Provider [facility]   3/31/2020  2:45 PM Julianna Nolasco MD [address] [place] [address]   5/6/2020 10:00 AM Adina Suarez MD [place], [place], [address]    Adult Rheumatology   Rheumatology Ultrasound Certified  [facility]  A Part of [facility], [address] [facility]   Phone [phone]  Fax [phone]

## 2020-01-14 NOTE — LETTER
1/14/20 Patient: Devon Puentes YOB: 1955 Date of Visit: 1/14/2020 Beatriz Olguin MD 
[address] [place] [id number] VIA In Basket Dear Beatriz Olguin MD, Thank you for referring Mr. Natalie Hernandez to [address] [place] [address] for evaluation. My notes for this consultation are attached. If you have questions, please do not hesitate to call me. I look forward to following your patient along with you.  
 
 
Sincerely, 
 
Chichi See MD

## 2020-01-16 LAB — CCP IGA+IGG SERPL IA-ACNC: >250 UNITS (ref 0–19)

## 2020-02-05 LAB
ALBUMIN SERPL-MCNC: 3.7 G/DL (ref 3.6–4.8)
ALBUMIN/GLOB SERPL: 1 {RATIO} (ref 1.2–2.2)
ALP SERPL-CCNC: 93 IU/L (ref 39–117)
ALT SERPL-CCNC: 21 IU/L (ref 0–44)
AST SERPL-CCNC: 19 IU/L (ref 0–40)
BASOPHILS # BLD AUTO: 0 X10E3/UL (ref 0–0.2)
BASOPHILS NFR BLD AUTO: 1 %
BILIRUB SERPL-MCNC: 0.4 MG/DL (ref 0–1.2)
BUN SERPL-MCNC: 13 MG/DL (ref 8–27)
BUN/CREAT SERPL: 10 (ref 10–24)
CALCIUM SERPL-MCNC: 8.8 MG/DL (ref 8.6–10.2)
CHLORIDE SERPL-SCNC: 105 MMOL/L (ref 96–106)
CO2 SERPL-SCNC: 23 MMOL/L (ref 20–29)
CREAT SERPL-MCNC: 1.29 MG/DL (ref 0.76–1.27)
CRP SERPL-MCNC: 5 MG/L (ref 0–10)
EOSINOPHIL # BLD AUTO: 0.5 X10E3/UL (ref 0–0.4)
EOSINOPHIL NFR BLD AUTO: 13 %
ERYTHROCYTE [DISTWIDTH] IN BLOOD BY AUTOMATED COUNT: 15.8 % (ref 11.6–15.4)
ERYTHROCYTE [SEDIMENTATION RATE] IN BLOOD BY WESTERGREN METHOD: 59 MM/HR (ref 0–30)
GLOBULIN SER CALC-MCNC: 3.7 G/DL (ref 1.5–4.5)
GLUCOSE SERPL-MCNC: 101 MG/DL (ref 65–99)
HCT VFR BLD AUTO: 34.3 % (ref 37.5–51)
HGB BLD-MCNC: 11.7 G/DL (ref 13–17.7)
IMM GRANULOCYTES # BLD AUTO: 0 X10E3/UL (ref 0–0.1)
IMM GRANULOCYTES NFR BLD AUTO: 0 %
LYMPHOCYTES # BLD AUTO: 1.1 X10E3/UL (ref 0.7–3.1)
LYMPHOCYTES NFR BLD AUTO: 27 %
MCH RBC QN AUTO: 33.4 PG (ref 26.6–33)
MCHC RBC AUTO-ENTMCNC: 34.1 G/DL (ref 31.5–35.7)
MCV RBC AUTO: 98 FL (ref 79–97)
METHOTREXATE PG1: NORMAL NMOL/L RBC
METHOTREXATE PG2: 37.93 NMOL/L RBC
METHOTREXATE PG3: 92.53 NMOL/L RBC
METHOTREXATE PG4: 44.83 NMOL/L RBC
METHOTREXATE PG5: 19.68 NMOL/L RBC
METHOTREXATE-PG TOTAL: NORMAL NMOL/L RBC
MONOCYTES # BLD AUTO: 0.6 X10E3/UL (ref 0.1–0.9)
MONOCYTES NFR BLD AUTO: 14 %
MTXPGSRA INTERPRETATION: NORMAL
NEUTROPHILS # BLD AUTO: 1.9 X10E3/UL (ref 1.4–7)
NEUTROPHILS NFR BLD AUTO: 45 %
PLATELET # BLD AUTO: 119 X10E3/UL (ref 150–450)
POTASSIUM SERPL-SCNC: 3.9 MMOL/L (ref 3.5–5.2)
PROT SERPL-MCNC: 7.4 G/DL (ref 6–8.5)
RBC # BLD AUTO: 3.5 X10E6/UL (ref 4.14–5.8)
SODIUM SERPL-SCNC: 142 MMOL/L (ref 134–144)
URATE SERPL-MCNC: 11.1 MG/DL (ref 3.7–8.6)
WBC # BLD AUTO: 4.2 X10E3/UL (ref 3.4–10.8)

## 2020-02-05 NOTE — PROGRESS NOTES
The results were reviewed. Uric acid is 11.1 mg/dL. Target is less than 5 mg/dL. He declined treatment. Stable mild anemia with elevated ESR 59. Platelets lower thank before at 119,000. I will recommend hematology evaluation. Stable chronic kidney disease (creatinine 1.29 mg/dL, eGFR 66).  Remaining labs are normal.

## 2020-03-24 ENCOUNTER — VIRTUAL VISIT (OUTPATIENT)
Dept: FAMILY MEDICINE CLINIC | Age: 65
End: 2020-03-24

## 2020-03-24 DIAGNOSIS — Z79.60 LONG-TERM USE OF IMMUNOSUPPRESSANT MEDICATION: ICD-10-CM

## 2020-03-24 DIAGNOSIS — R05.9 COUGH: Primary | ICD-10-CM

## 2020-03-24 RX ORDER — AZITHROMYCIN 250 MG/1
TABLET, FILM COATED ORAL
Qty: 6 TAB | Refills: 0 | Status: SHIPPED | OUTPATIENT
Start: 2020-03-24 | End: 2020-03-29

## 2020-03-24 NOTE — PROGRESS NOTES
Laura Lang is a 75 y.o. male who was seen by synchronous (real-time) audio-video technology on 3/24/2020. Assessment & Plan:   Diagnoses and all orders for this visit:    1. Cough    2. Long-term use of immunosuppressant medication    Other orders  -     azithromycin (ZITHROMAX) 250 mg tablet; Take 2 tablets today, then take 1 tablet daily      Patient is immunosuppressed and he worries about worsening symptoms as he approaches the weekend. I recommended to him that I suspect that this is likely viral versus allergic in nature and that if I was him I would hold off on the antibiotics temporarily however to prevent him from needing to go out into the community given that he is immunosuppressed I will send a prescription of azithromycin to his pharmacy. If by Friday his symptoms continue to worsen I believe it is not unreasonable to go ahead and do a trial of antibiotic for treatment of presumed at that point bacterial bronchitis. The patient was understanding and agreeable to this plan. He will continue with Robitussin and supportive measures until Friday and then if worsening he will  the prescription for his antibiotic. He will seek care if his symptoms are worsening or changing or if he develops fevers        CPT Codes 98721-90347 for Established Patients may apply to this Telehealth Visit      Subjective:   Laura Lang was seen for Cough  concerns for URI. He reports he gets one every year or too  He reports he has a cough, his phlegm is clear, his voice is a little raspy  He reports that he has been sick since Sunday   He is not having any fevers or chills    On MTX for rheumatoid arthritis  No ringing or popping in ears    No nausea or vomiting, no dirrhea    Doing well with cough syrup for right now, using robitussin, able to sleep    No gay, pnd. No fever, chills, cp, ha, vision changes    Prior to Admission medications    Medication Sig Start Date End Date Taking?  Authorizing Provider   azithromycin (ZITHROMAX) 250 mg tablet Take 2 tablets today, then take 1 tablet daily 3/24/20 3/29/20 Yes Indy Feliciano MD   methotrexate (RHEUMATREX) 2.5 mg tablet Take 6 Tabs by mouth every Tuesday. 1/14/20  Yes Jw Amador MD   losartan (COZAAR) 100 mg tablet Take 1 Tab by mouth daily. 1/14/20  Yes Jw Amador MD   folic acid (FOLVITE) 1 mg tablet TAKE 1 TABLET BY MOUTH EVERY DAY 12/20/19  Yes Demarcus Light MD   augmented betamethasone dipropionate (DIPROLENE-AF) 0.05 % ointment Apply  to affected area two (2) times daily as needed for Skin Irritation or Itching. 10/1/19  Yes Amanda Morse MD   amLODIPine (NORVASC) 5 mg tablet TAKE 1 TABLET BY MOUTH EVERY DAY 10/1/19  Yes Amanda Morse MD     Allergies   Allergen Reactions    Codeine Other (comments)     Talking out of head       I have reviewed the patient's past medical and surgical history, I have reviewed his social history, I have updated his allergies and medications as are appropriate. ROS  A 12 point review of systems was negative except as noted here or in the HPI. Objective:   Patient reports afebrile, speaking in complete sentences with normal respirations and effort  Est weight: 200  Est Height: 5'10  General: alert, cooperative, no distress   Mental  status: mental status: alert, oriented to person, place, and time, normal mood, behavior, speech, dress, motor activity, and thought processes   Resp: resp: normal effort, no respiratory distress and coughing - productive   Neuro: neuro: no gross deficits   Skin: skin: no discoloration or lesions of concern on visible areas     Due to this being a TeleHealth evaluation, many elements of the physical examination are unable to be assessed. We discussed the expected course, resolution and complications of the diagnosis(es) in detail. Medication risks, benefits, costs, interactions, and alternatives were discussed as indicated.   I advised him to contact the office if his condition worsens, changes or fails to improve as anticipated. He expressed understanding with the diagnosis(es) and plan. Pursuant to the emergency declaration under the [facility], [facility] waiver authority and the [employer] and [employer] Act, this Virtual  Visit was conducted, with patient's consent, to reduce the patient's risk of exposure to COVID-19 and provide continuity of care for an established patient. Services were provided through a video synchronous discussion virtually to substitute for in-person clinic visit.     Reyna Vásquez MD

## 2020-03-31 ENCOUNTER — OFFICE VISIT (OUTPATIENT)
Dept: FAMILY MEDICINE CLINIC | Age: 65
End: 2020-03-31

## 2020-03-31 VITALS
BODY MASS INDEX: 28.63 KG/M2 | SYSTOLIC BLOOD PRESSURE: 129 MMHG | TEMPERATURE: 97.7 F | RESPIRATION RATE: 18 BRPM | HEIGHT: 70 IN | HEART RATE: 60 BPM | WEIGHT: 200 LBS | DIASTOLIC BLOOD PRESSURE: 72 MMHG

## 2020-03-31 DIAGNOSIS — I10 ESSENTIAL HYPERTENSION: Primary | ICD-10-CM

## 2020-03-31 RX ORDER — LOSARTAN POTASSIUM 100 MG/1
100 TABLET ORAL DAILY
Qty: 90 TAB | Refills: 4 | Status: SHIPPED | OUTPATIENT
Start: 2020-03-31 | End: 2020-07-07 | Stop reason: ALTCHOICE

## 2020-03-31 RX ORDER — AMLODIPINE BESYLATE 5 MG/1
TABLET ORAL
Qty: 90 TAB | Refills: 4 | Status: SHIPPED | OUTPATIENT
Start: 2020-03-31 | End: 2021-04-26

## 2020-03-31 NOTE — PROGRESS NOTES
HISTORY OF PRESENT ILLNESS  Ashley Haq is a 75 y.o. male. Ashley Haq is here to follow up on their HTN. This is a chronic problem. The problem occurs constantly and is stable. The symptoms are relieved by losartan, Norvasc, which is/are working well. Review of Systems   Constitutional: Negative for weight loss. No weight gain   Eyes: Negative for blurred vision. Respiratory: Negative for shortness of breath. Cardiovascular: Negative for chest pain and leg swelling. Gastrointestinal: Negative for abdominal pain. Neurological: Negative for dizziness, sensory change, speech change, focal weakness and headaches. Visit Vitals  /72   Pulse 60   Temp 97.7 °F (36.5 °C) (Oral)   Resp 18   Ht 5' 10\" (1.778 m)   Wt 200 lb (90.7 kg)   BMI 28.70 kg/m²     BP Readings from Last 3 Encounters:   03/31/20 129/72   01/14/20 128/81   10/01/19 121/79       Physical Exam  Vitals signs and nursing note reviewed. Constitutional:       General: He is not in acute distress. Appearance: He is well-developed. He is not diaphoretic. Cardiovascular:      Rate and Rhythm: Normal rate and regular rhythm. Heart sounds: Normal heart sounds. No murmur. No friction rub. No gallop. Pulmonary:      Effort: Pulmonary effort is normal. No respiratory distress. Breath sounds: Normal breath sounds. No wheezing or rales. Skin:     General: Skin is warm and dry. Neurological:      Mental Status: He is alert and oriented to person, place, and time. ASSESSMENT and PLAN    ICD-10-CM ICD-9-CM    1. Essential hypertension I10 401.9 losartan (COZAAR) 100 mg tablet      amLODIPine (NORVASC) 5 mg tablet        Blood pressure controlled  Continue current plans. Follow-up and Dispositions    · Return in about 6 months (around 9/30/2020) for blood pressure. Reviewed plan of care. Patient has provided input and agrees with goals.

## 2020-04-07 ENCOUNTER — VIRTUAL VISIT (OUTPATIENT)
Dept: FAMILY MEDICINE CLINIC | Age: 65
End: 2020-04-07

## 2020-04-07 ENCOUNTER — TELEPHONE (OUTPATIENT)
Dept: FAMILY MEDICINE CLINIC | Age: 65
End: 2020-04-07

## 2020-04-07 DIAGNOSIS — R05.9 COUGH: Primary | ICD-10-CM

## 2020-04-07 RX ORDER — BENZONATATE 200 MG/1
200 CAPSULE ORAL
Qty: 30 CAP | Refills: 0 | Status: SHIPPED | OUTPATIENT
Start: 2020-04-07 | End: 2020-08-18

## 2020-04-07 NOTE — TELEPHONE ENCOUNTER
Pt states he is still coughing since being seen 3/24/20 and 3/31/20. States he was told to let Dr Mariya Yung know so she could call in an antibiotic. States No fever, or shortness of breath just the cough with clear mucus. Please call prescription [facility].   Pt can be reached at [phone]

## 2020-04-07 NOTE — PROGRESS NOTES
Jadyn Szymanski is a 75 y.o. male evaluated via telephone on 4/7/2020. Consent:  He and/or health care decision maker is aware that that he may receive a bill for this telephone service, depending on his insurance coverage, and has provided verbal consent to proceed: Yes      Documentation:  I communicated with the patient and/or health care decision maker about a cough he has had for about 1 1/2 weeks. It is getting better, but he does not want to be coughing at work. He is taking Robitussin and Delsym at night, which work. Evidently, he is worried about taking it during the day due to sedation. The cough is productive, occasionally, of clear sputum. Denies fever, chills, wheezing or shortness of breath. Details of this discussion including any medical advice provided:     ICD-10-CM ICD-9-CM    1. Cough R05 786.2 benzonatate (TESSALON) 200 mg capsule         Improving  Reassured that it is not unusual to cough like this after a viral respiratory illness  Rest, push fluids    Follow-up and Dispositions    · Return if not better in 2-3 weeks. Reviewed plan of care. Patient has provided input and agrees with goals. I affirm this is a Patient Initiated Episode with an Established Patient who has not had a related appointment within my department in the past 7 days or scheduled within the next 24 hours.     Total Time: minutes: 11-20 minutes    Note: not billable if this call serves to triage the patient into an appointment for the relevant concern      Alden Kwan MD

## 2020-04-15 DIAGNOSIS — M05.79 SEROPOSITIVE RHEUMATOID ARTHRITIS OF MULTIPLE SITES (HCC): ICD-10-CM

## 2020-04-15 RX ORDER — METHOTREXATE 2.5 MG/1
TABLET ORAL
Qty: 24 TAB | Refills: 2 | Status: SHIPPED | OUTPATIENT
Start: 2020-04-15 | End: 2020-05-05 | Stop reason: SDUPTHER

## 2020-05-06 ENCOUNTER — VIRTUAL VISIT (OUTPATIENT)
Dept: RHEUMATOLOGY | Age: 65
End: 2020-05-06

## 2020-05-06 DIAGNOSIS — Z79.60 LONG-TERM USE OF IMMUNOSUPPRESSANT MEDICATION: ICD-10-CM

## 2020-05-06 DIAGNOSIS — M1A.0720 IDIOPATHIC CHRONIC GOUT OF BOTH FEET: ICD-10-CM

## 2020-05-06 DIAGNOSIS — M05.79 SEROPOSITIVE RHEUMATOID ARTHRITIS OF MULTIPLE SITES (HCC): Primary | ICD-10-CM

## 2020-05-06 DIAGNOSIS — M1A.0710 IDIOPATHIC CHRONIC GOUT OF BOTH FEET: ICD-10-CM

## 2020-05-06 DIAGNOSIS — N18.2 CKD (CHRONIC KIDNEY DISEASE) STAGE 2, GFR 60-89 ML/MIN: ICD-10-CM

## 2020-05-06 RX ORDER — FOLIC ACID 1 MG/1
TABLET ORAL
Qty: 90 TAB | Refills: 3 | Status: SHIPPED | OUTPATIENT
Start: 2020-05-06 | End: 2020-11-05 | Stop reason: SDUPTHER

## 2020-05-06 RX ORDER — METHOTREXATE 2.5 MG/1
TABLET ORAL
Qty: 72 TAB | Refills: 1 | Status: SHIPPED | OUTPATIENT
Start: 2020-05-06 | End: 2020-11-05 | Stop reason: SDUPTHER

## 2020-05-06 NOTE — PROGRESS NOTES
REASON FOR VISIT    This is a follow-up visit for Mr. Field Part for    ICD-10-CM   1. Seropositive rheumatoid arthritis of multiple sites ([facility] M05.79   2. Iritis H20.9     The patient has consented for synchronous (real-time) Telemedicine (audio-video technology) on 5/6/2020 for their care to be delivered over telemedicine in place of their regularly scheduled office visit pursuant to the emergency declaration under the [address] [place] [address] waiver authority and the [employer] Act, this Virtual  Visit was conducted, with patient's consent, to reduce the patient's risk of exposure to COVID-19 and provide continuity of care for an established patient. Services were provided through a video synchronous discussion virtually to substitute for in-person clinic visit. Inflammatory arthritis phenotype includes:  Anti-CCP positive: yes (>250)  Rheumatoid factor positive: yes (179.6)  Erosive disease: no  Extra-articular manifestations include: iritis/uveitis    Immunosuppression Screening (6/04/2019):   Quantiferon TB: negative  PPD:  Not performed  Hepatitis B: negative  Hepatitis C: negative     Therapy History includes:  Current DMARD therapy include: methotrexate 15 mg every Tuesday  Prior DMARD therapy include: methotrexate 20 mg every Tuesday  Discontinued DMARDs because of inefficacy: None  Discontinued DMARDs because of side effects: methotrexate 20 mg weekly (mild pancytopenia (WBC, platelets))    Immunization History   Administered Date(s) Administered    Influenza Vaccine 10/07/2013    Pneumococcal Polysaccharide (PPSV-23) 10/10/2007       Patient Active Problem List   Diagnosis Code    Seropositive rheumatoid arthritis of multiple sites ([facility] M05.79    Eczema L30.9    Elevated cholesterol E78.00    BPH (benign prostatic hypertrophy) N40.0    History of renal cell cancer Z85.528    Essential hypertension I10    Iritis H20.9    Long-term use of immunosuppressant medication Z79.899    GERD (gastroesophageal reflux disease) K21.9    CKD (chronic kidney disease) stage 2, GFR 60-89 ml/min N18.2    Primary osteoarthritis of both knees M17.0     HISTORY OF PRESENT ILLNESS    Mr. Sandro Castellanos returns for a follow-up. On his last visit, I continued methotrexate to 15 mg every Tuesday, which he has taken with good tolerance and discussed initiation with gout therapy but he declined so I changed his DIOVAN-HCT to losartan 100 mg daily due to need to removal HCT. His uric acid was 11.1 mg/dL. Today, he feels well. He denies pain, swelling, or stiffness in his joints. He denies interval gouty flares. He takes folic acid on Tuesday and Thursday instead of daily. He is not playing tennis due to [employer]. His iritis is not active. He denies fever, weight loss, blurred vision, vision loss, oral ulcers, ankle swelling, dry cough, dyspnea, nausea, vomiting, dysphagia, abdominal pain, black or bloody stool, fall since last visit, rash, easy bruising and increased thirst.    Mr. Sandro Castellanos has continued his medications for arthritis and reports good tolerance without significant side effects. Last toxicity monitoring by blood work was done on 1/14/2020 and did not reveal any significant adverse effects, except WBC 2.2, Hct 34.3%, platelets 978,297, creatinine 1.29, eGFR 66. Most recent inflammatory markers from 1/14/2020 revealed a ESR 59 mm/hr and CRP 5 mg/L. The patient has not had any interval hospital admissions, infections, or surgeries. REVIEW OF SYSTEMS    A comprehensive review of systems was performed and pertinent results are documented in the HPI, review of systems is otherwise non-contributory.     PAST MEDICAL HISTORY    He has a past medical history of BPH (benign prostatic hypertrophy) (11/19/2012), Eczema (11/19/2012), Elevated cholesterol (11/19/2012), GERD (gastroesophageal reflux disease) (11/11/2016), History of renal cell cancer (11/19/2012), HTN (hypertension) (11/19/2012), Iritis (11/19/2012), RA (rheumatoid arthritis) ([facility] (11/19/2012), and Renal cell cancer ([facility].) (11/19/2012). FAMILY HISTORY    His family history includes Anemia in his mother; Arthritis-rheumatoid in his sister; Stroke in his maternal grandmother. SOCIAL HISTORY    He reports that he has never smoked. He has never used smokeless tobacco. He reports current alcohol use of about 8.0 - 9.0 standard drinks of alcohol per week. He reports that he does not use drugs. MEDICATIONS    Current Outpatient Medications   Medication Sig Dispense Refill    methotrexate (RHEUMATREX) 2.5 mg tablet TAKE 6 TABLETS BY MOUTH EVERY TUESDAY 72 Tab 1    folic acid (FOLVITE) 1 mg tablet TAKE 1 TABLET BY MOUTH EVERY DAY 90 Tab 3    benzonatate (TESSALON) 200 mg capsule Take 1 Cap by mouth three (3) times daily as needed for Cough. 30 Cap 0    losartan (COZAAR) 100 mg tablet Take 1 Tab by mouth daily. 90 Tab 4    amLODIPine (NORVASC) 5 mg tablet TAKE 1 TABLET BY MOUTH EVERY DAY 90 Tab 4    augmented betamethasone dipropionate (DIPROLENE-AF) 0.05 % ointment Apply  to affected area two (2) times daily as needed for Skin Irritation or Itching. 45 g 2        ALLERGIES    Allergies   Allergen Reactions    Codeine Other (comments)     Talking out of head     PHYSICAL EXAMINATION    There were no vitals taken for this visit. There is no height or weight on file to calculate BMI. General: Patient is alert, oriented x 3, not in acute distress    HEENT:   Sclerae are not injected and appear moist.  There is no alopecia. Neck is supple     Chest:  Breathing comfortably at room air    Musculoskeletal:  A comprehensive musculoskeletal exam was NOT performed for all joints of each upper and lower extremity and assessed for swelling, tenderness and range of motion.       Previous Exam    Decreased ROM of right elbow with flexion deformity  Bilateral knee crepitus without effusion  Left 1st MTP tenderness    Z-Deformities:   no  [place] Deformities:  no  Boutonierre's Deformities:  no  Ulnar Deviation:   no  MCP Subluxation:  no    Joint Count 1/14/2020 6/4/2019 3/1/2019 8/7/2018 5/7/2018 5/11/2017   Patient pain (0-100) 25 25 0 20 40 -   MHAQ 0.875 0 0.125 0.125 0.375 -   Left elbow - Tender - - - 0 - -   Left elbow - Swollen - - - 1 - -   Left wrist- Tender - - 0 - - -   Left wrist- Swollen - - 0 - - -   Right elbow - Tender 0 0 - - 0 -   Right elbow - Swollen 1 1 - - 1 -   Right 3rd MCP - Tender - 0 - - - -   Right 3rd MCP - Swollen - 1 - - - -   Tender Joint Count (Total) 0 0 0 0 0 -   Swollen Joint Count (Total) 1 2 0 1 1 -   Physician Assessment (0-10) 0 1 0 0 1 3   Patient Assessment (0-10) 2.5 2.5 3 2 3 2   CDAI Total (calculated) 3.5 5.5 3 3 5 -   CDAI - - - - - 5     DATA REVIEW    Laboratory     Recent laboratory results were reviewed, summarized, and discussed with the patient. Imaging    Musculoskeletal Ultrasound    None    Radiographs    Right Elbow 5/07/2018: no fracture, dislocation, effusion or other acute abnormality. Moderate joint space narrowing, mild spurring of the olecranon process. No bony erosion, no fracture. The bone is normal in mineral content. No joint effusion    Chest 9/22/2016: The cardiopericardial silhouette is within normal limits. There is no pleural effusion, pneumothorax or focal consolidation present. Bilateral Hand 3/15/2013: LEFT: Density appears normal. No articular erosions are seen. No abnormal soft tissue swelling is seen. RIGHT: Bone density is normal. No joint erosions are seen. There are minor osteoarthritic changes at the first carpometacarpal junction. No abnormal soft tissue swelling is seen    CT Imaging    None    MR Imaging    None    DXA     None    ASSESSMENT AND PLAN    This is a follow-up visit for Mr. Gisselle Parnell. 1) Seropositive Rheumatoid Arthritis with Iritis. This is chronic and longstanding.  He is maintained on methotrexate 15 mg every Tuesday (from 20 mg due to leukopenia and thrombocytopenia) with good tolerance but has been taking folic twice a week, which may explain his leukopenia and mild thrombocytopenia so I asked him to take it daily. Today, he feels well. He denies interval flares. He's not playing tennis due to [place]. He denies interval iritis. His CDAI was 3.5 (previously 5.5, 3, 3, 5) with 0 tender and 1 swollen, consistent with low disease activity after prednisone. I will continue treatent but asked that he take folic acid daily. Labs ordered. 2) Long Term Use of Immunosuppressants. The patient remains on immunomodulatory medications (methotrexate) and requires frequent toxicity monitoring by blood work. Respective labs were ordered (CBC and CMP). 3) Chronic Kidney Disease Stage 2. The patient's creatinine 1.29 mg/dL and eGFR 66. He has one kidney. He avoids NSAIDs. Repeat labs ordered today. 4) Chronic Anemia. His Hct was 31.4%. this is likely from chronic kidney disease. Repeat labs ordered today. 5) Acute Gout of Left Toe secondary Renal Impairment. This resolved with prednisone. He is on Losartan 100 mg daily which has uricosuric properties. He flared about 10-12 years ago. His most recent uric acid was 11.1 mg/dL. Target is less than 5 mg/dL. He declines initiation of urate lowering therapy at this time. 6) History of Renal Cancer. This was in 2008 and he is on remission. He has one kidney left. 7) Pancytopenia. (WBC and platelets). This is an interval development noted on most recent labs and may be due to taking folic acid twice per week rather than daily. I asked him to take it daily. The patient voiced understanding of the aforementioned assessment and plan.      The patient has consented for synchronous (real-time) Telemedicine (audio-video technology) on 5/6/2020 for their care to be delivered over telemedicine in place of their regularly scheduled office visit pursuant to the emergency declaration under the [facility] waiver authority and the [employer] Act, this Virtual  Visit was conducted, with patient's consent, to reduce the patient's risk of exposure to COVID-19 and provide continuity of care for an established patient. Services were provided through a video synchronous discussion virtually to substitute for in-person clinic visit.       TODAY'S ORDERS    Orders Placed This Encounter    CBC WITH AUTOMATED DIFF    METABOLIC PANEL, COMPREHENSIVE    C REACTIVE PROTEIN, QT    SED RATE (ESR)    URIC ACID    methotrexate (RHEUMATREX) 2.5 mg tablet    folic acid (FOLVITE) 1 mg tablet     Future Appointments   Date Time Provider Favio Jyoti   10/1/2020 10:45 AM MD Caron Blanchard MD, [address] [place] [address]    Adult Rheumatology   Rheumatology Ultrasound Certified  [facility]  A Part of [facility], [address]   Phone [phone]  Fax [phone]

## 2020-06-29 NOTE — PROGRESS NOTES
HISTORY OF PRESENT ILLNESS  Sarika Abad is a 59 y.o. male. HPI  Pt here for bp check, is 140/80s in office. He had wrist cuff that read systolics 286 in office. D/w pt to use a manual arm bp cuff, rx given. Advised to continue regimen. Continue activities. ROS  ROS:  Feeling well. No dyspnea or chest pain on exertion. No abdominal pain, change in bowel habits, black or bloody stools. No urinary tract or prostatic symptoms. No neurological complaints. Physical Exam  Gen: Oriented to person, place and time and well-developed, well-nourished and in no distress. HEENT:    Head: normocephalic and atraumatic. Eyes:  EOM are normal. Pupils equal and round. Neck:  Normal range of motion. Neck supple. Cardiovascular: normal rate, regular rhythm and normal heart sounds. Pulmonary/Chest:  Effort normal and breath sounds normal.  No respiratory distress. No wheezes, no rales. Abdominal: soft, normal  bowel sounds. Musculoskeletal:  No edema, no tenderness. No calf tenderness or edema. Neurological:  Alert, oriented to person, place and time. Skin: skin is warm and dry. ASSESSMENT and PLAN  Follow-up Disposition:  Return in about 3 months (around 7/11/2017), or if BP elevated. , for follow up HTN. 1. Essential hypertension     - Blood Pressure Monitor (BLOOD PRESSURE KIT) kit; Check blood pressure twice daily and record values. Goal is less than 140/90. Dispense: 1 Kit; Refill: 0    2. Essential hypertension with goal blood pressure less than 140/90     - valsartan-hydroCHLOROthiazide (DIOVAN-HCT) 320-25 mg per tablet; TAKE 1 TAB BY MOUTH DAILY for hypertension. Dispense: 30 Tab; Refill: 5  - amLODIPine (NORVASC) 5 mg tablet; Take 1 Tab by mouth daily. For hypertension  Dispense: 30 Tab; Refill: 5      I  have discussed the diagnosis with the patient and the intended treatment plan as seen in the above orders. Patient has provided input and agrees with goals.    The patient has received an after-visit summary and questions were answered concerning future plans. I have discussed medication side effects and warnings with the patient as well. 29-Jun-2020 11:14

## 2020-07-06 RX ORDER — LOSARTAN POTASSIUM 50 MG/1
100 TABLET ORAL DAILY
Qty: 180 TAB | Refills: 3 | Status: SHIPPED | OUTPATIENT
Start: 2020-07-06 | End: 2020-07-07 | Stop reason: ALTCHOICE

## 2020-07-07 ENCOUNTER — TELEPHONE (OUTPATIENT)
Dept: FAMILY MEDICINE CLINIC | Age: 65
End: 2020-07-07

## 2020-07-07 DIAGNOSIS — I10 ESSENTIAL HYPERTENSION: Primary | ICD-10-CM

## 2020-07-07 RX ORDER — VALSARTAN 160 MG/1
160 TABLET ORAL DAILY
Qty: 30 TAB | Refills: 1 | Status: SHIPPED | OUTPATIENT
Start: 2020-07-07 | End: 2020-07-22 | Stop reason: SDUPTHER

## 2020-07-07 NOTE — TELEPHONE ENCOUNTER
[facility] on [address] faxed request for substitution for losartan due to backorder no alternatives given. No

## 2020-07-08 NOTE — TELEPHONE ENCOUNTER
Called and spoke with pt, and he has been advised and states understanding of medication change and has kelli scheduled for Tuesday, August 18, 2020 10:45 AM.

## 2020-07-22 DIAGNOSIS — I10 ESSENTIAL HYPERTENSION: ICD-10-CM

## 2020-07-22 RX ORDER — VALSARTAN 160 MG/1
160 TABLET ORAL DAILY
Qty: 90 TAB | Refills: 2 | Status: SHIPPED | OUTPATIENT
Start: 2020-07-22 | End: 2020-08-18

## 2020-08-18 ENCOUNTER — VIRTUAL VISIT (OUTPATIENT)
Dept: FAMILY MEDICINE CLINIC | Age: 65
End: 2020-08-18
Payer: COMMERCIAL

## 2020-08-18 DIAGNOSIS — N18.2 CKD (CHRONIC KIDNEY DISEASE) STAGE 2, GFR 60-89 ML/MIN: ICD-10-CM

## 2020-08-18 DIAGNOSIS — I10 ESSENTIAL HYPERTENSION: Primary | ICD-10-CM

## 2020-08-18 DIAGNOSIS — E78.00 ELEVATED CHOLESTEROL: ICD-10-CM

## 2020-08-18 DIAGNOSIS — I10 ESSENTIAL HYPERTENSION: ICD-10-CM

## 2020-08-18 PROCEDURE — 99214 OFFICE O/P EST MOD 30 MIN: CPT | Performed by: FAMILY MEDICINE

## 2020-08-18 RX ORDER — PREDNISONE 5 MG/1
TABLET ORAL
COMMUNITY
Start: 2020-07-07 | End: 2021-05-06 | Stop reason: ALTCHOICE

## 2020-08-18 RX ORDER — LOSARTAN POTASSIUM 50 MG/1
TABLET ORAL
COMMUNITY
Start: 2020-08-11 | End: 2021-07-27

## 2020-08-18 NOTE — PROGRESS NOTES
Jordi Clifton is a 77 y.o. male who was seen by synchronous (real-time) audio-video technology on 8/18/2020. Assessment & Plan:   Diagnoses and all orders for this visit:    1. Essential hypertension  -     METABOLIC PANEL, BASIC; Future    2. CKD (chronic kidney disease) stage 2, GFR 60-89 ml/min  -     METABOLIC PANEL, BASIC; Future    3. Elevated cholesterol  -     LIPID PANEL; Future  -     HEPATIC FUNCTION PANEL; Future        Unknown BP  Due for follow up labs  Labs per orders. Continue current plans. He will call be with his BP    Follow-up and Dispositions    · Return pending BP result. Reviewed plan of care. Patient has provided input and agrees with goals. CPT Codes 92754-93516 for Established Patients may apply to this Telehealth Visit      Subjective:   Jordi Clifton was seen for Hypertension (Follow up) and Medication Refill      Jordi Clifton is here to follow up on their HTN. This is a chronic problem. The problem occurs constantly and is ? Heron Rein The symptoms are relieved by Norvasc, losartan, which is/are working ? Heron Rein He is unable to take his BP today. Review of Systems   Constitutional: Negative for weight loss. No weight gain   Eyes: Negative for blurred vision. Respiratory: Negative for shortness of breath. Cardiovascular: Negative for chest pain and leg swelling. Gastrointestinal: Negative for abdominal pain. Neurological: Negative for dizziness, sensory change, speech change, focal weakness and headaches. Objective:     Physical Exam  Constitutional:       General: He is not in acute distress. Appearance: Normal appearance. Neurological:      Mental Status: He is alert and oriented to person, place, and time. Due to this being a TeleHealth evaluation, many elements of the physical examination are unable to be assessed.          We discussed the expected course, resolution and complications of the diagnosis(es) in detail. Medication risks, benefits, costs, interactions, and alternatives were discussed as indicated. I advised him to contact the office if his condition worsens, changes or fails to improve as anticipated. He expressed understanding with the diagnosis(es) and plan. Pursuant to the emergency declaration under the [address] [place] [address], [facility] waiver authority and the [employer] Act, this Virtual  Visit was conducted, with patient's consent, to reduce the patient's risk of exposure to COVID-19 and provide continuity of care for an established patient. Services were provided through a video synchronous discussion virtually to substitute for in-person clinic visit.     Leigha Herrera MD

## 2020-08-18 NOTE — PROGRESS NOTES
Chief Complaint   Patient presents with    Hypertension     Follow up    Medication Refill   1. Have you been to the ER, urgent care clinic since your last visit? Hospitalized since your last visit? No    2. Have you seen or consulted any other health care providers outside of the [facility] since your last visit? Include any pap smears or colon screening.  No

## 2020-11-06 ENCOUNTER — VIRTUAL VISIT (OUTPATIENT)
Dept: RHEUMATOLOGY | Age: 65
End: 2020-11-06
Payer: COMMERCIAL

## 2020-11-06 DIAGNOSIS — M1A.0720 IDIOPATHIC CHRONIC GOUT OF BOTH FEET: ICD-10-CM

## 2020-11-06 DIAGNOSIS — M1A.0710 IDIOPATHIC CHRONIC GOUT OF BOTH FEET: ICD-10-CM

## 2020-11-06 DIAGNOSIS — N18.2 CKD (CHRONIC KIDNEY DISEASE) STAGE 2, GFR 60-89 ML/MIN: ICD-10-CM

## 2020-11-06 DIAGNOSIS — Z79.60 LONG-TERM USE OF IMMUNOSUPPRESSANT MEDICATION: ICD-10-CM

## 2020-11-06 DIAGNOSIS — M05.79 SEROPOSITIVE RHEUMATOID ARTHRITIS OF MULTIPLE SITES (HCC): Primary | ICD-10-CM

## 2020-11-06 PROCEDURE — 99215 OFFICE O/P EST HI 40 MIN: CPT | Performed by: INTERNAL MEDICINE

## 2020-11-06 RX ORDER — METHOTREXATE 2.5 MG/1
TABLET ORAL
Qty: 32 TAB | Refills: 0 | Status: SHIPPED | OUTPATIENT
Start: 2020-11-06 | End: 2020-11-25

## 2020-11-06 RX ORDER — FOLIC ACID 1 MG/1
TABLET ORAL
Qty: 90 TAB | Refills: 3 | Status: SHIPPED | OUTPATIENT
Start: 2020-11-06 | End: 2021-08-31 | Stop reason: SDUPTHER

## 2020-11-06 NOTE — PROGRESS NOTES
REASON FOR VISIT    This is a follow-up visit for Mr. Maite Hoover for    ICD-10-CM   1. Seropositive rheumatoid arthritis of multiple sites ([facility] M05.79   2. Iritis H20.9     The patient has consented for synchronous (real-time) Telemedicine (audio-video technology) on 11/6/2020 for their care to be delivered over telemedicine in place of their regularly scheduled office visit pursuant to the emergency declaration under the [facility] [place] [address] waiver authority and the [employer] Act, this Virtual  Visit was conducted, with patient's consent, to reduce the patient's risk of exposure to COVID-19 and provide continuity of care for an established patient. Services were provided through a video synchronous discussion virtually to substitute for in-person clinic visit. Inflammatory arthritis phenotype includes:  Anti-CCP positive: yes (>250)  Rheumatoid factor positive: yes (179.6)  Erosive disease: no  Extra-articular manifestations include: iritis/uveitis    Immunosuppression Screening (6/04/2019): Quantiferon TB: negative  PPD:  Not performed  Hepatitis B: negative  Hepatitis C: negative     Therapy History includes:  Current DMARD therapy include: methotrexate 15 mg every Tuesday  Prior DMARD therapy include: methotrexate 20 mg every Tuesday  Discontinued DMARDs because of inefficacy: None  Discontinued DMARDs because of side effects: methotrexate 20 mg weekly (mild pancytopenia (WBC, platelets))    HISTORY OF PRESENT ILLNESS    Mr. Maite Hoover returns for a follow-up. On his last visit, I continued methotrexate to 15 mg every Tuesday, which he has taken with good tolerance. I asked him to take folic acid daily. I ordered labs which were not done. Today, he feels well. He had a couple of flares in right knee in 7/2020 and 9/2020. He self-medicated with prednisone which resolved it. He also had a flare in his wrists.  Today, he denies pain, swelling, or stiffness in his joints. He denies interval gouty flares. He takes folic acid 4 to 5 times a week. He is playing tennis. His iritis is not active. He denies fever, weight loss, blurred vision, vision loss, oral ulcers, ankle swelling, dry cough, dyspnea, nausea, vomiting, dysphagia, abdominal pain, black or bloody stool, fall since last visit, rash, easy bruising and increased thirst.    Last toxicity monitoring by blood work was done on 1/14/2020 and did not reveal any significant adverse effects, except WBC 2.2, Hct 34.3%, platelets 279,998, creatinine 1.29, eGFR 66. Most recent inflammatory markers from 1/14/2020 revealed a ESR 59 mm/hr and CRP 5 mg/L. The patient has not had any interval hospital admissions, infections, or surgeries. REVIEW OF SYSTEMS    A comprehensive review of systems was performed and pertinent results are documented in the HPI, review of systems is otherwise non-contributory. PAST MEDICAL HISTORY    He has a past medical history of BPH (benign prostatic hypertrophy) (11/19/2012), Eczema (11/19/2012), Elevated cholesterol (11/19/2012), GERD (gastroesophageal reflux disease) (11/11/2016), History of renal cell cancer (11/19/2012), HTN (hypertension) (11/19/2012), Iritis (11/19/2012), RA [facility]heumatoid [facility]) ([facility] (11/19/2012), and Renal cell cancer ([facility] (11/19/2012). FAMILY HISTORY    His family history includes Anemia in his mother; Arthritis-rheumatoid in his sister; Stroke in his maternal grandmother. SOCIAL HISTORY    He reports that he has never smoked. He has never used smokeless tobacco. He reports current alcohol use of about 8.0 - 9.0 standard drinks of alcohol per week. He reports that he does not use drugs.     MEDICATIONS    Current Outpatient Medications   Medication Sig Dispense Refill    methotrexate (RHEUMATREX) 2.5 mg tablet TAKE 6 TABLETS BY MOUTH EVERY TUESDAY 32 Tab 0    folic acid (FOLVITE) 1 mg tablet TAKE 1 TABLET BY MOUTH EVERY DAY 90 Tab 3    losartan (COZAAR) 50 mg tablet TAKE 2 TABLETS BY MOUTH EVERY DAY      predniSONE (DELTASONE) 5 mg tablet PLEASE SEE ATTACHED FOR DETAILED DIRECTIONS      amLODIPine (NORVASC) 5 mg tablet TAKE 1 TABLET BY MOUTH EVERY DAY 90 Tab 4    augmented betamethasone dipropionate (DIPROLENE-AF) 0.05 % ointment Apply  to affected area two (2) times daily as needed for Skin Irritation or Itching. 45 g 2        ALLERGIES    Allergies   Allergen Reactions    Codeine Other (comments)     Talking out of head     PHYSICAL EXAMINATION    There were no vitals taken for this visit. There is no height or weight on file to calculate BMI. General: Patient is alert, oriented x 3, not in acute distress    HEENT:   Sclerae are not injected and appear moist.  There is no alopecia. Chest:  Breathing comfortably at room air    Musculoskeletal:  A comprehensive musculoskeletal exam was NOT performed for all joints of each upper and lower extremity and assessed for swelling, tenderness and range of motion.       Previous Exam    Decreased ROM of right elbow with flexion deformity  Bilateral knee crepitus without effusion  Left 1st MTP tenderness    Z-Deformities:   no  [place]ast[place] Neck Deformities:  no  Boutonierre's Deformities:  no  Ulnar Deviation:   no  MCP Subluxation:  no    Joint Count 1/14/2020 6/4/2019 3/1/2019 8/7/2018 5/7/2018 5/11/2017   Patient pain (0-100) 25 25 0 20 40 -   MHAQ 0.875 0 0.125 0.125 0.375 -   Left elbow - Tender - - - 0 - -   Left elbow - Swollen - - - 1 - -   Left wrist- Tender - - 0 - - -   Left wrist- Swollen - - 0 - - -   Right elbow - Tender 0 0 - - 0 -   Right elbow - Swollen 1 1 - - 1 -   Right 3rd MCP - Tender - 0 - - - -   Right 3rd MCP - Swollen - 1 - - - -   Tender Joint Count (Total) 0 0 0 0 0 -   Swollen Joint Count (Total) 1 2 0 1 1 -   Physician Assessment (0-10) 0 1 0 0 1 3   Patient Assessment (0-10) 2.5 2.5 3 2 3 2   CDAI Total (calculated) 3.5 5.5 3 3 5 -   CDAI - - - - - 5     DATA REVIEW    Laboratory     Recent laboratory results were reviewed, summarized, and discussed with the patient. Imaging    Musculoskeletal Ultrasound    None    Radiographs    Right Elbow 5/07/2018: no fracture, dislocation, effusion or other acute abnormality. Moderate joint space narrowing, mild spurring of the olecranon process. No bony erosion, no fracture. The bone is normal in mineral content. No joint effusion    Chest 9/22/2016: The cardiopericardial silhouette is within normal limits. There is no pleural effusion, pneumothorax or focal consolidation present. Bilateral Hand 3/15/2013: LEFT: Density appears normal. No articular erosions are seen. No abnormal soft tissue swelling is seen. RIGHT: Bone density is normal. No joint erosions are seen. There are minor osteoarthritic changes at the first carpometacarpal junction. No abnormal soft tissue swelling is seen    CT Imaging    None    MR Imaging    None    DXA     None    ASSESSMENT AND PLAN    This is a follow-up visit for Mr. Belkis De La Torre. 1) Seropositive Rheumatoid Arthritis with Iritis. This is chronic and longstanding. He is maintained on methotrexate 15 mg every Tuesday (from 20 mg due to leukopenia and thrombocytopenia due to not taking folic acid) with good tolerance but has been taking folic 4 to 5 times a week. He is playing tennis. He has had several flares, so I will increase his methotrexate to 20 mg weekly and to take folic acid daily. He did not do his labs. Today, he feels well. He denies interval flares. He's not playing tennis due to [place]. He denies interval iritis. His CDAI was 3.5 (previously 5.5, 3, 3, 5) with 0 tender and 1 swollen, consistent with low disease activity after prednisone. I will increase methotrexate to 20 mg but only gave him 30 days supply because he needs to have his labs done. No refills until labs are done. He understood. Labs will be mailed.      2) Long Term Use of Immunosuppressants. The patient remains on immunomodulatory medications (methotrexate) and requires frequent toxicity monitoring by blood work. Respective labs were ordered (CBC and CMP). Labs will be mailed. 3) Chronic Kidney Disease Stage 2. The patient's creatinine 1.29 mg/dL and eGFR 66. He has one kidney. He avoids NSAIDs. Repeat labs ordered today. 4) Chronic Anemia. His Hct was 31.4%. this is likely from chronic kidney disease. Repeat labs ordered today. 5) Acute Gout of Left Toe secondary Renal Impairment. This resolved with prednisone. He is on Losartan 100 mg daily which has uricosuric properties. He flared about 10-12 years ago. His most recent uric acid was 11.1 mg/dL. Target is less than 5 mg/dL. He declined initiation of urate lowering therapy at this time. 6) History of Renal Cancer. This was in 2008 and he is on remission. He has one kidney left. 7) Pancytopenia. (WBC and platelets). This was an interval development noted on most recent labs and may be due to taking folic acid twice per week rather than daily. I asked him to take it daily. The patient voiced understanding of the aforementioned assessment and plan. The patient has consented for synchronous (real-time) Telemedicine (audio-video technology) on 11/6/2020 for their care to be delivered over telemedicine in place of their regularly scheduled office visit pursuant to the emergency declaration under the [facility] waiver authority and the [employer] Act, this Virtual  Visit was conducted, with patient's consent, to reduce the patient's risk of exposure to COVID-19 and provide continuity of care for an established patient. Services were provided through a video synchronous discussion virtually to substitute for in-person clinic visit.     TODAY'S ORDERS    Orders Placed This Encounter    METABOLIC PANEL, COMPREHENSIVE  METHOTREXATE POLYGLUTAMATES    CBC WITH AUTOMATED DIFF    C REACTIVE PROTEIN, QT    SED RATE (ESR)    URIC ACID    PROTEIN ELECTROPHORESIS W/ REFLX ESTEFANIA    methotrexate (RHEUMATREX) 2.5 mg tablet    folic acid (FOLVITE) 1 mg tablet     No future appointments.   Clarke Stovall MD, [address] [place] [address]    Adult Rheumatology   Rheumatology Ultrasound Certified  [address]  [facility], [address] [place] [address]   Phone [phone]  Fax [phone]

## 2020-11-24 ENCOUNTER — TELEPHONE (OUTPATIENT)
Dept: RHEUMATOLOGY | Age: 65
End: 2020-11-24

## 2020-11-24 NOTE — TELEPHONE ENCOUNTER
Pt called stating that he is having pain in his fingers, toes and wrists. He has not increased his methotrexate yet because the script was sent in for 6 tabs once weekly. He took his dose today so I asked him to take 2 more tabs today and to take 8 tabs once weekly from here on out. Dr. Romeo Stanton agreed to send in prednisone 20mg daily for 5 days for him. He stated an understanding.

## 2020-11-25 DIAGNOSIS — M05.79 SEROPOSITIVE RHEUMATOID ARTHRITIS OF MULTIPLE SITES (HCC): ICD-10-CM

## 2020-11-25 RX ORDER — METHOTREXATE 2.5 MG/1
20 TABLET ORAL
Qty: 96 TAB | Refills: 1 | Status: SHIPPED | OUTPATIENT
Start: 2020-12-01 | End: 2021-01-11 | Stop reason: SDUPTHER

## 2020-11-25 RX ORDER — PREDNISONE 20 MG/1
20 TABLET ORAL
Qty: 5 TAB | Refills: 0 | Status: SHIPPED | OUTPATIENT
Start: 2020-11-25 | End: 2020-11-30

## 2021-01-11 DIAGNOSIS — M05.79 SEROPOSITIVE RHEUMATOID ARTHRITIS OF MULTIPLE SITES (HCC): ICD-10-CM

## 2021-01-11 RX ORDER — METHOTREXATE 2.5 MG/1
20 TABLET ORAL
Qty: 96 TAB | Refills: 1 | Status: SHIPPED | OUTPATIENT
Start: 2021-01-12 | End: 2021-07-26

## 2021-04-25 DIAGNOSIS — L30.9 ECZEMA, UNSPECIFIED TYPE: ICD-10-CM

## 2021-04-25 DIAGNOSIS — I10 ESSENTIAL HYPERTENSION: ICD-10-CM

## 2021-04-26 RX ORDER — AMLODIPINE BESYLATE 5 MG/1
TABLET ORAL
Qty: 30 TAB | Refills: 14 | Status: SHIPPED | OUTPATIENT
Start: 2021-04-26 | End: 2021-05-05

## 2021-04-26 RX ORDER — BETAMETHASONE DIPROPIONATE 0.5 MG/G
OINTMENT TOPICAL
Qty: 45 G | Refills: 2 | Status: SHIPPED | OUTPATIENT
Start: 2021-04-26

## 2021-05-02 NOTE — PROGRESS NOTES
HISTORY OF PRESENT ILLNESS  Emmett Adrian is a 77 y.o. male. Patient presents with:  Hypertension: htn fu no issues with meds htn controlled no concerns at this time     Emmett Adrian is here to follow up on their HTN. This is a chronic problem. He continues to see Rheumatology for his RA. Review of Systems   Eyes: Negative for blurred vision. Respiratory: Negative for shortness of breath. Cardiovascular: Negative for chest pain. Neurological: Negative for dizziness, sensory change, speech change, focal weakness and headaches. Visit Vitals  BP (!) 137/90   Pulse 64   Temp 97 °F (36.1 °C) (Oral)   Resp 18   Ht 5' 10\" (1.778 m)   Wt 215 lb (97.5 kg)   SpO2 98%   BMI 30.85 kg/m²     BP Readings from Last 3 Encounters:   05/05/21 (!) 137/90   03/31/20 129/72   01/14/20 128/81       Physical Exam  Vitals signs and nursing note reviewed. Constitutional:       General: He is not in acute distress. Appearance: He is well-developed. He is not diaphoretic. Cardiovascular:      Rate and Rhythm: Normal rate and regular rhythm. Heart sounds: Normal heart sounds. No murmur. No friction rub. No gallop. Pulmonary:      Effort: Pulmonary effort is normal. No respiratory distress. Breath sounds: Normal breath sounds. No wheezing or rales. Skin:     General: Skin is warm and dry. Neurological:      Mental Status: He is alert and oriented to person, place, and time. ASSESSMENT and PLAN    ICD-10-CM ICD-9-CM    1. Essential hypertension  L28 623.2 METABOLIC PANEL, BASIC      amLODIPine (NORVASC) 10 mg tablet   2. Elevated cholesterol  E78.00 272.0 LIPID PANEL      HEPATIC FUNCTION PANEL   3. Seropositive rheumatoid arthritis of multiple sites (HCC)  M05.79 714.0         Blood pressure elevated  Labs per orders. Increase Norvasc  Continue to follow up with Rheumatology      Follow-up and Dispositions    · Return in about 6 weeks (around 6/16/2021) for blood pressure. Reviewed plan of care. Patient has provided input and agrees with goals.

## 2021-05-05 ENCOUNTER — OFFICE VISIT (OUTPATIENT)
Dept: FAMILY MEDICINE CLINIC | Age: 66
End: 2021-05-05
Payer: COMMERCIAL

## 2021-05-05 VITALS
TEMPERATURE: 97 F | DIASTOLIC BLOOD PRESSURE: 88 MMHG | RESPIRATION RATE: 18 BRPM | BODY MASS INDEX: 30.78 KG/M2 | WEIGHT: 215 LBS | SYSTOLIC BLOOD PRESSURE: 150 MMHG | HEIGHT: 70 IN | OXYGEN SATURATION: 98 % | HEART RATE: 64 BPM

## 2021-05-05 DIAGNOSIS — E78.00 ELEVATED CHOLESTEROL: ICD-10-CM

## 2021-05-05 DIAGNOSIS — M05.79 SEROPOSITIVE RHEUMATOID ARTHRITIS OF MULTIPLE SITES (HCC): ICD-10-CM

## 2021-05-05 DIAGNOSIS — I10 ESSENTIAL HYPERTENSION: Primary | ICD-10-CM

## 2021-05-05 PROCEDURE — 99214 OFFICE O/P EST MOD 30 MIN: CPT | Performed by: FAMILY MEDICINE

## 2021-05-05 RX ORDER — AMLODIPINE BESYLATE 10 MG/1
10 TABLET ORAL DAILY
Qty: 30 TAB | Refills: 1 | Status: SHIPPED | OUTPATIENT
Start: 2021-05-05 | End: 2021-05-19 | Stop reason: SDUPTHER

## 2021-05-05 NOTE — PROGRESS NOTES
Chief Complaint   Patient presents with    Hypertension     htn fu no issues with meds htn controlled no concerns at this time    \

## 2021-05-06 ENCOUNTER — VIRTUAL VISIT (OUTPATIENT)
Dept: RHEUMATOLOGY | Age: 66
End: 2021-05-06
Payer: COMMERCIAL

## 2021-05-06 DIAGNOSIS — M05.79 SEROPOSITIVE RHEUMATOID ARTHRITIS OF MULTIPLE SITES (HCC): Primary | ICD-10-CM

## 2021-05-06 DIAGNOSIS — M1A.0710 IDIOPATHIC CHRONIC GOUT OF BOTH FEET: ICD-10-CM

## 2021-05-06 DIAGNOSIS — M1A.0720 IDIOPATHIC CHRONIC GOUT OF BOTH FEET: ICD-10-CM

## 2021-05-06 DIAGNOSIS — H20.9 IRITIS: ICD-10-CM

## 2021-05-06 DIAGNOSIS — Z79.60 LONG-TERM USE OF IMMUNOSUPPRESSANT MEDICATION: ICD-10-CM

## 2021-05-06 PROCEDURE — 99215 OFFICE O/P EST HI 40 MIN: CPT | Performed by: INTERNAL MEDICINE

## 2021-05-06 NOTE — PROGRESS NOTES
REASON FOR VISIT    This is a follow-up visit for Mr. Regino Pearson for    ICD-10-CM   1. Seropositive rheumatoid [facility] sites ([facility] M05.79   2. Iritis H20.9     The patient has consented for synchronous (real-time) Telemedicine (audio-video technology) on 5/6/2021 for their care to be delivered over telemedicine in place of their regularly scheduled office visit pursuant to the emergency declaration under the [address] [place] [address] [facility] authority and the [employer] Act, this Virtual  Visit was conducted, with patient's consent, to reduce the patient's risk of exposure to COVID-19 and provide continuity of care for an established patient. Services were provided through a video synchronous discussion virtually to substitute for in-person clinic visit. Inflammatory arthritis phenotype includes:  Anti-CCP positive: yes (>250)  Rheumatoid factor positive: yes (179.6)  Erosive disease: no  Extra-articular manifestations include: iritis/uveitis    Immunosuppression Screening (6/04/2019): Quantiferon TB: negative  PPD:  Not performed  Hepatitis B: negative  Hepatitis C: negative     Therapy History includes:  Current DMARD therapy include: methotrexate 20 mg every Tuesday (11/24/2020 to present)  Prior DMARD therapy include: methotrexate 15 mg every Tuesday  Discontinued DMARDs because of inefficacy: None  Discontinued DMARDs because of side effects: none    HISTORY OF PRESENT ILLNESS    Mr. Regino Pearson returns for a follow-up. On his last visit, I increased hismethotrexate from 15 mg to 20 mg every Tuesday, which he has taken with good tolerance. I asked him to take folic acid daily. I ordered labs which were not done. He called on 11/242020 flaring but had not increased his methotrexate to 20 mg, so did at that time. Today, he feels good. He denies pain, swelling, or stiffness in his joints.     He denies interval gouty flares. He received his COVID vaccines. He denies fever, weight loss, blurred vision, vision loss, oral ulcers, ankle swelling, dry cough, dyspnea, nausea, vomiting, dysphagia, abdominal pain, black or bloody stool, fall since last visit, rash, easy bruising and increased thirst.    Most recent toxicity monitoring by blood work was done on 5/06/2021 and did not reveal any significant adverse effects, except creatinine 1.41, eGFR >60. Most recent inflammatory markers from 1/14/2020 revealed a ESR 59 mm/hr and CRP 5 mg/L. I reviewed the patient's interval medical history, surgical history, medications, and allergies. The patient has not had any interval hospital admissions, infections, or surgeries. REVIEW OF SYSTEMS    A comprehensive review of systems was performed and pertinent results are documented in the HPI, review of systems is otherwise non-contributory. PAST MEDICAL HISTORY    He has a past medical history of BPH (benign prostatic hypertrophy) (11/19/2012), Eczema (11/19/2012), Elevated cholesterol (11/19/2012), GERD (gastroesophageal reflux disease) (11/11/2016), History of renal cell cancer (11/19/2012), HTN (hypertension) (11/19/2012), Iritis (11/19/2012), RA (rheumatoid arthritis) ([facility].) (11/19/2012), and Renal cell cancer ([facility] (11/19/2012). FAMILY HISTORY    His family history includes Anemia in his mother; Arthritis-rheumatoid in his sister; Stroke in his maternal grandmother. SOCIAL HISTORY    He reports that he has never smoked. He has never used smokeless tobacco. He reports current alcohol use of about 8.0 - 9.0 standard drinks of alcohol per week. He reports that he does not use drugs. MEDICATIONS    Current Outpatient Medications   Medication Sig    amLODIPine (NORVASC) 10 mg tablet Take 1 Tab by mouth daily.     augmented betamethasone dipropionate (DIPROLENE-AF) 0.05 % ointment Apply  to affected area two (2) times daily as needed for Skin Irritation or Itching.  methotrexate (RHEUMATREX) 2.5 mg tablet Take 8 Tabs by mouth every Tuesday. TAKE 6 TABLETS BY MOUTH EVERY TUESDAY    folic acid (FOLVITE) 1 mg tablet TAKE 1 TABLET BY MOUTH EVERY DAY    losartan (COZAAR) 50 mg tablet TAKE 2 TABLETS BY MOUTH EVERY DAY     No current facility-administered medications for this visit. ALLERGIES    Allergies   Allergen Reactions    Codeine Other (comments)     Talking out of head     PHYSICAL EXAMINATION    There were no vitals taken for this visit. There is no height or weight on file to calculate BMI. General: Patient is alert, oriented x 3, not in acute distress    HEENT:   Sclerae are not injected and appear moist.  There is no alopecia. Chest:  Breathing comfortably at room air    Musculoskeletal:  A comprehensive musculoskeletal exam was NOT performed for all joints of each upper and lower extremity and assessed for swelling, tenderness and range of motion.       Previous Exam    Decreased ROM of right elbow with flexion deformity  Bilateral knee crepitus without effusion  Left 1st MTP tenderness    Z-Deformities:   no  [place] Neck Deformities:  no  Boutonierre's Deformities:  no  Ulnar Deviation:   no  MCP Subluxation:  no    Joint Count 1/14/2020 6/4/2019 3/1/2019 8/7/2018 5/7/2018 5/11/2017   Patient pain (0-100) 25 25 0 20 40 -   MHAQ 0.875 0 0.125 0.125 0.375 -   Left elbow - Tender - - - 0 - -   Left elbow - Swollen - - - 1 - -   Left wrist- Tender - - 0 - - -   Left wrist- Swollen - - 0 - - -   Right elbow - Tender 0 0 - - 0 -   Right elbow - Swollen 1 1 - - 1 -   Right 3rd MCP - Tender - 0 - - - -   Right 3rd MCP - Swollen - 1 - - - -   Tender Joint Count (Total) 0 0 0 0 0 -   Swollen Joint Count (Total) 1 2 0 1 1 -   Physician Assessment (0-10) 0 1 0 0 1 3   Patient Assessment (0-10) 2.5 2.5 3 2 3 2   CDAI Total (calculated) 3.5 5.5 3 3 5 -   CDAI - - - - - 5     DATA REVIEW    Laboratory     Recent laboratory results were reviewed, summarized, and discussed with the patient. Imaging    Musculoskeletal Ultrasound    None    Radiographs    Right Elbow 5/07/2018: no fracture, dislocation, effusion or other acute abnormality. Moderate joint space narrowing, mild spurring of the olecranon process. No bony erosion, no fracture. The bone is normal in mineral content. No joint effusion    Chest 9/22/2016: The cardiopericardial silhouette is within normal limits. There is no pleural effusion, pneumothorax or focal consolidation present. Bilateral Hand 3/15/2013: LEFT: Density appears normal. No articular erosions are seen. No abnormal soft tissue swelling is seen. RIGHT: Bone density is normal. No joint erosions are seen. There are minor osteoarthritic changes at the first carpometacarpal junction. No abnormal soft tissue swelling is seen    CT Imaging    None    MR Imaging    None    DXA     None    ASSESSMENT AND PLAN    This is a follow-up visit for Mr. Aden Martinez. 1) Seropositive Rheumatoid Arthritis with Iritis. He is maintained on methotrexate 20 mg every Tuesday, which he has taken with good tolerance. He is now taking his folic acid. Today, he feels well. He did not do his labs, so I asked him to get them done. I sent a new order. He's back to playing tennis. His CDAI was previously 3.5 (previously 5.5, 3, 3, 5) with 0 tender and 1 swollen, consistent with low disease activity after prednisone. I will continue treatment. 2) Long Term Use of Immunosuppressants. The patient remains on high risk immunomodulatory medications (methotrexate) and requires frequent toxicity monitoring by blood work to evaluate for toxicities. Respective labs were ordered (see below orders for details). 3) Chronic Kidney Disease Stage 2. The patient's creatinine 1.41 mg/dL and eGFR >60. He has one kidney. He avoids NSAIDs. 4) Chronic Anemia. His Hct was 31.4%. this is likely from chronic kidney disease. Repeat labs ordered today.     5) Acute Gout of Left Toe secondary Renal Impairment. This resolved with prednisone. He is on Losartan 100 mg daily which has uricosuric properties. He flared about 10-12 years ago. His most recent uric acid was 11.1 mg/dL. Target is less than 5 mg/dL. He declined initiation of urate lowering therapy at this time. I will check his level, but I recommend treatment. 6) History of Renal Cancer. This was in 2008 and he is on remission. He has one kidney left. 7) Pancytopenia. (WBC and platelets). This was an interval development noted on most recent labs and may be due to taking folic acid twice per week rather than daily. He is now taking it daily. I will check his CBCD. The patient voiced understanding of the aforementioned assessment and plan. The patient has consented for synchronous (real-time) Telemedicine (audio-video technology) on 5/6/2021 for their care to be delivered over telemedicine in place of their regularly scheduled office visit pursuant to the emergency declaration under the [facility] waiver authority and the [employer] Act, this Virtual  Visit was conducted, with patient's consent, to reduce the patient's risk of exposure to COVID-19 and provide continuity of care for an established patient. A total of 43 minutes was spent on this visit, reviewing interval notes, interval testing results, ordering tests, refilling medications, documenting the findings in the note, patient education, counseling, and coordination of care as described above. All questions asked and answered. Services were provided through a video synchronous discussion virtually to substitute for in-person clinic visit.     TODAY'S ORDERS    Orders Placed This Encounter    QUANTIFERON-TB GOLD PLUS    METHOTREXATE POLYGLUTAMATES    VITAMIN D, 25 HYDROXY    URIC ACID    SED RATE (ESR)    C REACTIVE PROTEIN, QT    CBC WITH AUTOMATED DIFF    CHRONIC HEPATITIS PANEL     Future Appointments   Date Time Provider Favio Montes   6/16/2021  8:45 AM Ilda Potter MD [facility] BS AMB   8/31/2021 10:00 AM Rosaura Suarez MD [facility] BS AMB     Augusto Tavarez MD, [address] [facility]    Adult Rheumatology   Rheumatology Ultrasound Certified  General acute hospital  A Part of [facility], [address] [place] [address]   Phone [phone]  Fax [phone]

## 2021-05-19 DIAGNOSIS — I10 ESSENTIAL HYPERTENSION: ICD-10-CM

## 2021-05-20 RX ORDER — AMLODIPINE BESYLATE 10 MG/1
10 TABLET ORAL DAILY
Qty: 90 TABLET | Refills: 0 | Status: SHIPPED | OUTPATIENT
Start: 2021-05-20 | End: 2021-09-16

## 2021-08-31 ENCOUNTER — VIRTUAL VISIT (OUTPATIENT)
Dept: RHEUMATOLOGY | Age: 66
End: 2021-08-31
Payer: COMMERCIAL

## 2021-08-31 DIAGNOSIS — M1A.0720 IDIOPATHIC CHRONIC GOUT OF BOTH FEET: ICD-10-CM

## 2021-08-31 DIAGNOSIS — M05.79 SEROPOSITIVE RHEUMATOID ARTHRITIS OF MULTIPLE SITES (HCC): Primary | ICD-10-CM

## 2021-08-31 DIAGNOSIS — M1A.0710 IDIOPATHIC CHRONIC GOUT OF BOTH FEET: ICD-10-CM

## 2021-08-31 DIAGNOSIS — Z79.60 LONG-TERM USE OF IMMUNOSUPPRESSANT MEDICATION: ICD-10-CM

## 2021-08-31 DIAGNOSIS — H20.9 IRITIS: ICD-10-CM

## 2021-08-31 PROCEDURE — 99214 OFFICE O/P EST MOD 30 MIN: CPT | Performed by: INTERNAL MEDICINE

## 2021-08-31 RX ORDER — FOLIC ACID 1 MG/1
TABLET ORAL
Qty: 90 TABLET | Refills: 5 | Status: SHIPPED | OUTPATIENT
Start: 2021-08-31

## 2021-08-31 RX ORDER — PREDNISONE 20 MG/1
20 TABLET ORAL
Qty: 7 TABLET | Refills: 0 | Status: SHIPPED | OUTPATIENT
Start: 2021-08-31 | End: 2022-11-01 | Stop reason: SDUPTHER

## 2021-08-31 NOTE — PROGRESS NOTES
REASON FOR VISIT    This is a follow-up visit for Mr. Jerry Parrish for    ICD-10-CM   1. Seropositive rheumatoid arthritis of multiple sites ([facility] M05.79   2. Iritis H20.9     The patient has consented for synchronous (real-time) Telemedicine (audio-video technology) on 8/31/2021 for their care to be delivered over telemedicine in place of their regularly scheduled office visit pursuant to the emergency declaration under the [address] [place] [address] waiver authority and the [employer] Act, this Virtual  Visit was conducted, with patient's consent, to reduce the patient's risk of exposure to COVID-19 and provide continuity of care for an established patient. Services were provided through a video synchronous discussion virtually to substitute for in-person clinic visit. Inflammatory arthritis phenotype includes:  Anti-CCP positive: yes (>250)  Rheumatoid factor positive: yes (179.6)  Erosive disease: no  Extra-articular manifestations include: iritis/uveitis    Immunosuppression Screening (6/04/2019): Quantiferon TB: negative  PPD:  Not performed  Hepatitis B: negative  Hepatitis C: negative     Therapy History includes:  Current DMARD therapy include: methotrexate 20 mg every Tuesday (11/24/2020 to present)  Prior DMARD therapy include: methotrexate 15 mg every Tuesday  Discontinued DMARDs because of inefficacy: None  Discontinued DMARDs because of side effects: none    HISTORY OF PRESENT ILLNESS    Mr. Jerry Parrish returns for a follow-up. On his last visit, I continued methotrexate 20 mg every Tuesday, which he has taken with good tolerance. I ordered labs which were not done until 8/27/2021, which I reviewed with him. Today, he feels good. He denies pain, swelling, or stiffness in his joints. He had a flare in 7/2021 in his right wrist lasting an hour. He denies interval iritis/uveitis flares.     He denies interval gouty flares. He denies fever, weight loss, blurred vision, vision loss, oral ulcers, ankle swelling, dry cough, dyspnea, nausea, vomiting, dysphagia, abdominal pain, black or bloody stool, fall since last visit, rash, easy bruising and increased thirst.    Most recent toxicity monitoring by blood work was done on 8/27/2021 and did not reveal any significant adverse effects, except HCt 36.9% eGFR 67. Most recent uric acid on 8/27/2021 was 5.9 mg/dL. I reviewed the patient's interval medical history, surgical history, medications, and allergies. The patient has not had any interval hospital admissions, infections, or surgeries. REVIEW OF SYSTEMS    A comprehensive review of systems was performed and pertinent results are documented in the HPI, review of systems is otherwise non-contributory. PAST MEDICAL HISTORY    He has a past medical history of BPH (benign prostatic hypertrophy) (11/19/2012), Eczema (11/19/2012), Elevated cholesterol (11/19/2012), GERD (gastroesophageal reflux disease) (11/11/2016), History of renal cell cancer (11/19/2012), HTN (hypertension) (11/19/2012), Iritis (11/19/2012), RA (rheumatoid arthritis) ([facility] (11/19/2012), and Renal cell cancer ([facility] (11/19/2012). FAMILY HISTORY    His family history includes Anemia in his mother; Arthritis-rheumatoid in his sister; Stroke in his maternal grandmother. SOCIAL HISTORY    He reports that he has never smoked. He has never used smokeless tobacco. He reports current alcohol use of about 8.0 - 9.0 standard drinks of alcohol per week. He reports that he does not use drugs. MEDICATIONS    Current Outpatient Medications   Medication Sig    folic acid (FOLVITE) 1 mg tablet TAKE 1 TABLET BY MOUTH EVERY DAY    predniSONE (DELTASONE) 20 mg tablet Take 20 mg by mouth daily as needed for Pain.  losartan (COZAAR) 100 mg tablet Take 1 Tablet by mouth daily.  methotrexate (RHEUMATREX) 2.5 mg tablet Take 8 Tablets by mouth every Tuesday.  amLODIPine (NORVASC) 10 mg tablet Take 1 Tablet by mouth daily.  augmented betamethasone dipropionate (DIPROLENE-AF) 0.05 % ointment Apply  to affected area two (2) times daily as needed for Skin Irritation or Itching. No current facility-administered medications for this visit. ALLERGIES    Allergies   Allergen Reactions    Codeine Other (comments)     Talking out of head     PHYSICAL EXAMINATION    There were no vitals taken for this visit. There is no height or weight on file to calculate BMI. General: Patient is alert, oriented x 3, not in acute distress    HEENT:   Sclerae are not injected and appear moist.  There is no alopecia. Chest:  Breathing comfortably at room air    Musculoskeletal:  A comprehensive musculoskeletal exam was NOT performed for all joints of each upper and lower extremity and assessed for swelling, tenderness and range of motion.       Previous Exam    Decreased ROM of right elbow with flexion deformity  Bilateral knee crepitus without effusion  Left 1st MTP tenderness    Z-Deformities:   no  [place] Neck Deformities:  no  Boutonierre's Deformities:  no  Ulnar Deviation:   no  MCP Subluxation:  no    Joint Count 1/14/2020 6/4/2019 3/1/2019 8/7/2018 5/7/2018 5/11/2017   Patient pain (0-100) 25 25 0 20 40 -   MHAQ 0.875 0 0.125 0.125 0.375 -   Left elbow - Tender - - - 0 - -   Left elbow - Swollen - - - 1 - -   Left wrist- Tender - - 0 - - -   Left wrist- Swollen - - 0 - - -   Right elbow - Tender 0 0 - - 0 -   Right elbow - Swollen 1 1 - - 1 -   Right 3rd MCP - Tender - 0 - - - -   Right 3rd MCP - Swollen - 1 - - - -   Tender Joint Count (Total) 0 0 0 0 0 -   Swollen Joint Count (Total) 1 2 0 1 1 -   Physician Assessment (0-10) 0 1 0 0 1 3   Patient Assessment (0-10) 2.5 2.5 3 2 3 2   CDAI Total (calculated) 3.5 5.5 3 3 5 -   CDAI - - - - - 5     DATA REVIEW    Laboratory     Recent laboratory results were reviewed, summarized, and discussed with the patient. Imaging    Musculoskeletal Ultrasound    None    Radiographs    Right Elbow 5/07/2018: no fracture, dislocation, effusion or other acute abnormality. Moderate joint space narrowing, mild spurring of the olecranon process. No bony erosion, no fracture. The bone is normal in mineral content. No joint effusion    Chest 9/22/2016: The cardiopericardial silhouette is within normal limits. There is no pleural effusion, pneumothorax or focal consolidation present. Bilateral Hand 3/15/2013: LEFT: Density appears normal. No articular erosions are seen. No abnormal soft tissue swelling is seen. RIGHT: Bone density is normal. No joint erosions are seen. There are minor osteoarthritic changes at the first carpometacarpal junction. No abnormal soft tissue swelling is seen    CT Imaging    None    MR Imaging    None    DXA     None    ASSESSMENT AND PLAN    This is a follow-up visit for Mr. Josie Pretty. 1) Seropositive Rheumatoid Arthritis with Iritis. He is maintained on methotrexate 20 mg every Tuesday, which he has taken with good tolerance. Today, he feels well. His CDAI was previously 3.5 (previously 5.5, 3, 3, 5) with 0 tender and 1 swollen, consistent with low disease activity after prednisone. I will continue treatment. I asked him to get his Booster vaccine for COVID but he is worried about flaring, so I gave him prednisone to take as needed. 2) Long Term Use of Immunosuppressants. The patient remains on high risk immunomodulatory medications (methotrexate) and requires frequent toxicity monitoring by blood work to evaluate for toxicities. 3) Chronic Kidney Disease Stage 2. The patient's eGFR 67. He has one kidney. He avoids NSAIDs. 4) Chronic Anemia. His Hct was 31.4%. this is likely from chronic kidney disease. Repeat labs ordered today. 5) Acute Gout of Left Toe secondary Renal Impairment. This resolved with prednisone.  He is on Losartan 100 mg daily which has uricosuric properties. He flared about 10-12 years ago. His most recent uric acid was 5.9 mg/dL (previously 11.1 mg/dL). Target is less than 5 mg/dL. He declined initiation of urate lowering therapy at this time. 6) History of Renal Cancer. This was in 2008 and he is on remission. He has one kidney left. 7) Pancytopenia. (WBC and platelets). This resolved. The patient voiced understanding of the aforementioned assessment and plan. The patient has consented for synchronous (real-time) Telemedicine (audio-video technology) on 8/31/2021 for their care to be delivered over telemedicine in place of their regularly scheduled office visit pursuant to the emergency declaration under the [facility], [facility] waiver authority and the [employer] Act, this Virtual  Visit was conducted, with patient's consent, to reduce the patient's risk of exposure to COVID-19 and provide continuity of care for an established patient. A total of 33 minutes was spent on this visit, reviewing interval notes, interval testing results, ordering tests, refilling medications, documenting the findings in the note, patient education, counseling, and coordination of care as described above. All questions asked and answered. Services were provided through a video synchronous discussion virtually to substitute for in-person clinic visit.     TODAY'S ORDERS    Orders Placed This Encounter    folic acid (FOLVITE) 1 mg tablet    predniSONE (DELTASONE) 20 mg tablet     Future Appointments   Date Time Provider Department Center   2/2/2022  9:40 AM Michelle Suarez MD [facility]     Kai Claude, MD, [address] [place] [address]    Adult Rheumatology   Rheumatology Ultrasound Certified  [facility]  A Part of [address]   Phone [phone]  Fax [phone]

## 2021-09-03 LAB
ALBUMIN SERPL ELPH-MCNC: 3.5 G/DL (ref 2.9–4.4)
ALBUMIN SERPL-MCNC: 4.2 G/DL (ref 3.8–4.8)
ALBUMIN/GLOB SERPL: 0.9 {RATIO} (ref 0.7–1.7)
ALBUMIN/GLOB SERPL: 1.3 {RATIO} (ref 1.2–2.2)
ALP SERPL-CCNC: 98 IU/L (ref 48–121)
ALPHA1 GLOB SERPL ELPH-MCNC: 0.3 G/DL (ref 0–0.4)
ALPHA2 GLOB SERPL ELPH-MCNC: 0.6 G/DL (ref 0.4–1)
ALT SERPL-CCNC: 13 IU/L (ref 0–44)
AST SERPL-CCNC: 20 IU/L (ref 0–40)
B-GLOBULIN SERPL ELPH-MCNC: 1.4 G/DL (ref 0.7–1.3)
BASOPHILS # BLD AUTO: 0 X10E3/UL (ref 0–0.2)
BASOPHILS NFR BLD AUTO: 1 %
BILIRUB SERPL-MCNC: 0.4 MG/DL (ref 0–1.2)
BUN SERPL-MCNC: 9 MG/DL (ref 8–27)
BUN/CREAT SERPL: 7 (ref 10–24)
CALCIUM SERPL-MCNC: 8.9 MG/DL (ref 8.6–10.2)
CHLORIDE SERPL-SCNC: 104 MMOL/L (ref 96–106)
CO2 SERPL-SCNC: 24 MMOL/L (ref 20–29)
CREAT SERPL-MCNC: 1.27 MG/DL (ref 0.76–1.27)
CRP SERPL-MCNC: 5 MG/L (ref 0–10)
EOSINOPHIL # BLD AUTO: 0.3 X10E3/UL (ref 0–0.4)
EOSINOPHIL NFR BLD AUTO: 6 %
ERYTHROCYTE [DISTWIDTH] IN BLOOD BY AUTOMATED COUNT: 14.5 % (ref 11.6–15.4)
ERYTHROCYTE [SEDIMENTATION RATE] IN BLOOD BY WESTERGREN METHOD: 24 MM/HR (ref 0–30)
GAMMA GLOB SERPL ELPH-MCNC: 1.7 G/DL (ref 0.4–1.8)
GLOBULIN SER CALC-MCNC: 3.3 G/DL (ref 1.5–4.5)
GLOBULIN SER CALC-MCNC: 4 G/DL (ref 2.2–3.9)
GLUCOSE SERPL-MCNC: 103 MG/DL (ref 65–99)
HCT VFR BLD AUTO: 36.9 % (ref 37.5–51)
HGB BLD-MCNC: 12.7 G/DL (ref 13–17.7)
IMM GRANULOCYTES # BLD AUTO: 0 X10E3/UL (ref 0–0.1)
IMM GRANULOCYTES NFR BLD AUTO: 0 %
LYMPHOCYTES # BLD AUTO: 1.4 X10E3/UL (ref 0.7–3.1)
LYMPHOCYTES NFR BLD AUTO: 30 %
M PROTEIN SERPL ELPH-MCNC: ABNORMAL G/DL
MCH RBC QN AUTO: 34.3 PG (ref 26.6–33)
MCHC RBC AUTO-ENTMCNC: 34.4 G/DL (ref 31.5–35.7)
MCV RBC AUTO: 100 FL (ref 79–97)
METHOTREXATE PG1: 55 NMOL/L RBC
METHOTREXATE PG2: 23 NMOL/L RBC
METHOTREXATE PG3: 106 NMOL/L RBC
METHOTREXATE PG4: 61 NMOL/L RBC
METHOTREXATE PG5: 41 NMOL/L RBC
METHOTREXATE-PG TOTAL: 286 NMOL/L RBC
MONOCYTES # BLD AUTO: 0.5 X10E3/UL (ref 0.1–0.9)
MONOCYTES NFR BLD AUTO: 12 %
MTXPGSRA INTERPRETATION: NORMAL
NEUTROPHILS # BLD AUTO: 2.3 X10E3/UL (ref 1.4–7)
NEUTROPHILS NFR BLD AUTO: 51 %
PLATELET # BLD AUTO: 205 X10E3/UL (ref 150–450)
PLEASE NOTE, 011150: ABNORMAL
POTASSIUM SERPL-SCNC: 4.2 MMOL/L (ref 3.5–5.2)
PROT PATTERN SERPL ELPH-IMP: ABNORMAL
PROT SERPL-MCNC: 7.5 G/DL (ref 6–8.5)
RBC # BLD AUTO: 3.7 X10E6/UL (ref 4.14–5.8)
SODIUM SERPL-SCNC: 139 MMOL/L (ref 134–144)
URATE SERPL-MCNC: 5.9 MG/DL (ref 3.8–8.4)
WBC # BLD AUTO: 4.5 X10E3/UL (ref 3.4–10.8)

## 2021-09-07 NOTE — PROGRESS NOTES
The results were reviewed. Hct 36.9%. uric acid 5.9 mg/dL (not on treatment). Remaining labs are good.

## 2021-09-14 DIAGNOSIS — I10 ESSENTIAL HYPERTENSION: ICD-10-CM

## 2021-09-16 RX ORDER — AMLODIPINE BESYLATE 10 MG/1
TABLET ORAL
Qty: 90 TABLET | Refills: 0 | Status: SHIPPED | OUTPATIENT
Start: 2021-09-16 | End: 2021-09-24 | Stop reason: SDUPTHER

## 2021-09-17 NOTE — TELEPHONE ENCOUNTER
----- Message from [facility] sent at 9/17/2021  9:40 AM EDT -----  Regarding: Dr. Nicki Puri / Donnie Christianson (if not patient): N/A      Relationship of caller (if not patient): N/A      Best contact number(s):[phone]      Name of medication and dosage if known: \"Amlodipine Besylate 10 MG\"      Is patient out of this medication (yes/no): Yes      Pharmacy name: [facility]    Pharmacy listed in chart? (yes/no): Yes    Pharmacy phone number: Unable to Provide      Details to clarify the request:      [facility]

## 2021-09-19 RX ORDER — AMLODIPINE BESYLATE 10 MG/1
10 TABLET ORAL DAILY
Qty: 90 TABLET | Refills: 0 | OUTPATIENT
Start: 2021-09-19

## 2021-09-24 ENCOUNTER — VIRTUAL VISIT (OUTPATIENT)
Dept: FAMILY MEDICINE CLINIC | Age: 66
End: 2021-09-24
Payer: COMMERCIAL

## 2021-09-24 DIAGNOSIS — I10 ESSENTIAL HYPERTENSION: Primary | ICD-10-CM

## 2021-09-24 DIAGNOSIS — Z12.11 SCREEN FOR COLON CANCER: ICD-10-CM

## 2021-09-24 DIAGNOSIS — N18.2 CKD (CHRONIC KIDNEY DISEASE) STAGE 2, GFR 60-89 ML/MIN: ICD-10-CM

## 2021-09-24 PROCEDURE — 99214 OFFICE O/P EST MOD 30 MIN: CPT | Performed by: FAMILY MEDICINE

## 2021-09-24 RX ORDER — LOSARTAN POTASSIUM 100 MG/1
100 TABLET ORAL DAILY
Qty: 90 TABLET | Refills: 0 | Status: SHIPPED | OUTPATIENT
Start: 2021-09-24 | End: 2021-12-17

## 2021-09-24 RX ORDER — AMLODIPINE BESYLATE 10 MG/1
10 TABLET ORAL DAILY
Qty: 90 TABLET | Refills: 0 | Status: SHIPPED | OUTPATIENT
Start: 2021-09-24 | End: 2021-12-17

## 2021-09-24 NOTE — PROGRESS NOTES
Jeff Hammer is a 74 y.o. male who was seen by synchronous (real-time) audio-video technology on 9/24/2021. Assessment & Plan:   Diagnoses and all orders for this visit:    1. Essential hypertension  -     amLODIPine (NORVASC) 10 mg tablet; Take 1 Tablet by mouth daily. -     losartan (COZAAR) 100 mg tablet; Take 1 Tablet by mouth daily. 2. CKD (chronic kidney disease) stage 2, GFR 60-89 ml/min    3. Screen for colon cancer  -     REFERRAL TO GASTROENTEROLOGY        SBP borderline, just took medication however  GFR 68 in August  Continue current plans. Refills per orders  Colonoscopy     Follow-up and Dispositions    · Return in about 3 months (around 12/24/2021) for blood pressure. Reviewed plan of care. Patient has provided input and agrees with goals. CPT Codes 43734-53587 for Established Patients may apply to this Telehealth Visit      Subjective:   Jeff Hammer was seen for Hypertension (Follow up)      Patient presents with:  Hypertension: Follow up            Review of Systems   Eyes: Negative for blurred vision. Respiratory: Negative for shortness of breath. Cardiovascular: Negative for chest pain. Neurological: Negative for dizziness, sensory change, speech change, focal weakness and headaches. Objective:   /86    Physical Exam  Constitutional:       General: He is not in acute distress. Appearance: Normal appearance. Neurological:      Mental Status: He is alert and oriented to person, place, and time. Due to this being a TeleHealth evaluation, many elements of the physical examination are unable to be assessed. We discussed the expected course, resolution and complications of the diagnosis(es) in detail. Medication risks, benefits, costs, interactions, and alternatives were discussed as indicated. I advised him to contact the office if his condition worsens, changes or fails to improve as anticipated.  He expressed understanding with the diagnosis(es) and plan. Pursuant to the emergency declaration under the [facility], [facility] waiver authority and the [employer] Act, this Virtual  Visit was conducted, with patient's consent, to reduce the patient's risk of exposure to COVID-19 and provide continuity of care for an established patient. Services were provided through a video synchronous discussion virtually to substitute for in-person clinic visit.     Brisa Macias MD

## 2021-09-24 NOTE — PROGRESS NOTES
Chief Complaint   Patient presents with    Hypertension     Follow up     1. Have you been to the ER, urgent care clinic since your last visit? Hospitalized since your last visit? No    2. Have you seen or consulted any other health care providers outside of the [address] [place] [address] since your last visit? Include any pap smears or colon screening.  No

## 2021-12-15 DIAGNOSIS — I10 ESSENTIAL HYPERTENSION: ICD-10-CM

## 2021-12-15 NOTE — LETTER
12/17/2021 4:10 PM    Mr. Mariel Garcia  [address] [facility] [address]      Dear Mr. Kimi Mcburney:    Yousif Maddox missed you! Please call our office at [phone] and schedule a follow up appointment for your continued care with in the next 60 days, we are offering virtual appointments as well. I will be unable to continue refilling your medication until you have been seen for an appointment. Look forward to seeing you soon.          Sincerely,      Fabiola Edge MD

## 2021-12-17 RX ORDER — AMLODIPINE BESYLATE 10 MG/1
TABLET ORAL
Qty: 90 TABLET | Refills: 0 | Status: SHIPPED | OUTPATIENT
Start: 2021-12-17 | End: 2022-03-04

## 2021-12-17 RX ORDER — LOSARTAN POTASSIUM 100 MG/1
TABLET ORAL
Qty: 90 TABLET | Refills: 0 | Status: SHIPPED | OUTPATIENT
Start: 2021-12-17 | End: 2022-04-14 | Stop reason: SDUPTHER

## 2022-02-23 ENCOUNTER — OFFICE VISIT (OUTPATIENT)
Dept: RHEUMATOLOGY | Age: 67
End: 2022-02-23
Payer: COMMERCIAL

## 2022-02-23 VITALS
TEMPERATURE: 97.6 F | OXYGEN SATURATION: 98 % | HEART RATE: 65 BPM | WEIGHT: 215 LBS | RESPIRATION RATE: 16 BRPM | SYSTOLIC BLOOD PRESSURE: 134 MMHG | BODY MASS INDEX: 30.85 KG/M2 | DIASTOLIC BLOOD PRESSURE: 90 MMHG

## 2022-02-23 DIAGNOSIS — M05.79 SEROPOSITIVE RHEUMATOID ARTHRITIS OF MULTIPLE SITES (HCC): Primary | ICD-10-CM

## 2022-02-23 PROCEDURE — 99215 OFFICE O/P EST HI 40 MIN: CPT | Performed by: PEDIATRICS

## 2022-02-23 RX ORDER — METHOTREXATE 2.5 MG/1
20 TABLET ORAL
Qty: 32 TABLET | Refills: 3 | Status: SHIPPED | OUTPATIENT
Start: 2022-02-27 | End: 2022-09-13 | Stop reason: ALTCHOICE

## 2022-02-23 RX ORDER — PREDNISONE 5 MG/1
TABLET ORAL
Qty: 30 TABLET | Refills: 1 | Status: SHIPPED | OUTPATIENT
Start: 2022-02-23 | End: 2022-04-14

## 2022-02-23 NOTE — PROGRESS NOTES
RHEUMATOLOGY PROBLEM LIST AND CHIEF COMPLAINT  1. Seropositive rheumatoid arthritis - CCP (>250), RF (179.6)  2. Iritis     Immunosuppression Screening (6/04/2019): Quantiferon TB: negative  PPD:  Not performed  Hepatitis B: negative  Hepatitis C: negative     Therapy History includes:  Current DMARD therapy include: methotrexate 20 mg every Tuesday (11/24/2020 to present)  Prior DMARD therapy include: methotrexate 15 mg every Tuesday  Discontinued DMARDs because of inefficacy: None  Discontinued DMARDs because of side effects: none    INTERVAL HISTORY  This is a 74 y.o.  male. Today, the patient complains of pain in the joints. Location: none  Severity: 0 on a scale of 0-10  Timing: none   Context/Associated signs and symptoms: The patient was previously followed by Dr. Jackie Avila and will be switching to my care. Patient has been previously diagnosed with rheumatoid arthritis and iritis and is currently being treated with Methotrexate 20 mg PO weekly. Denies adverse side effects to Methotrexate. The patient reports doing well today with no joint complaints or uveitis/iritis flares.       RHEUMATOLOGY REVIEW OF SYSTEMS   Positives as per history  Negatives as follows:  Ruben Noble:  Denies unexplained persistent fevers, weight change, chronic fatigue  HEAD/EYES:   Denies eye redness, blurry vision or sudden loss of vision, dry eyes, HA, temporal artery pain  ENT:    Denies oral/nasal ulcers, recurrent sinus infections, dry mouth  RESPIRATORY:  No pleuritic pain, history of pleural effusions, hemoptysis, exertional dyspnea  CARDIOVASCULAR: Denies chest pain, history of pericardial effusions  GASTRO:   Denies heartburn, esophageal dysmotility, abdominal pain, nausea, vomiting, diarrhea, blood in the stool  HEMATOLOGIC:  No easy bruising, purpura, swollen lymph nodes  SKIN:    Denies alopecia, ulcers, nodules, sun sensitivity, unexplained persistent rash   VASCULAR:   Denies edema, cyanosis, raynaud phenomenon  NEUROLOGIC:  Denies specific muscle weakness, paresthesias   PSYCHIATRIC:  No sleep disturbance / snoring, depression, anxiety  MSK:    No morning stiffness >1 hour, SI joint pain, persistent joint swelling, persistent joint pain    PAST MEDICAL HISTORY  Reviewed with patient, significant changes in medical history - no    He has a past medical history of BPH (benign prostatic hypertrophy) (11/19/2012), Eczema (11/19/2012), Elevated cholesterol (11/19/2012), GERD (gastroesophageal reflux disease) (11/11/2016), History of renal cell cancer (11/19/2012), HTN (hypertension) (11/19/2012), Iritis (11/19/2012), RA (rheumatoid arthritis) ([facility] (11/19/2012), and Renal cell cancer ([facility] (11/19/2012). FAMILY HISTORY  rheumatoid arthritis - sister    PHYSICAL EXAM  Blood pressure (!) 134/90, pulse 65, temperature 97.6 °F (36.4 °C), temperature source Oral, resp. rate 16, weight 215 lb (97.5 kg), SpO2 98 %. GENERAL APPEARANCE: Well-nourished, no acute distress  EYES: No scleral erythema, conjunctival injection  ENT: No oral ulcer, parotid enlargement  NECK: No adenopathy, thyroid enlargement  CARDIOVASCULAR: Heart rhythm is regular. No murmur, rub, gallop  CHEST: Normal vesicular breath sounds. No wheezes, rales, pleural friction rubs  ABDOMINAL: The abdomen is soft and nontender.  Bowel sounds are normal  EXTREMITIES: There is no evidence of clubbing, cyanosis, edema  SKIN: No rash, palpable purpura, digital ulcer, abnormal thickening, normal nailfold capillaries   NEUROLOGICAL: Normal gait and station, full strength in upper and lower extremities,  normal sensation to light touch  MUSCULOSKELETAL:   Upper extremities - full range of motion, no tenderness, no swelling, no synovial thickening and no deformity of joints  Lower extremities - full range of motion, no tenderness, no swelling, no synovial thickening and no deformity of joints     LABS, RADIOLOGY AND PROCEDURES  Previous labs reviewed -Yes  Previous radiology reviewed -Yes    Right Elbow 5/07/2018: no fracture, dislocation, effusion or other acute abnormality. Moderate joint space narrowing, mild spurring of the olecranon process. No bony erosion, no fracture. The bone is normal in mineral content. No joint effusion    Chest 9/22/2016: The cardiopericardial silhouette is within normal limits. There is no pleural effusion, pneumothorax or focal consolidation present. Bilateral Hand 3/15/2013: LEFT: Density appears normal. No articular erosions are seen. No abnormal soft tissue swelling is seen. RIGHT: Bone density is normal. No joint erosions are seen. There are minor osteoarthritic changes at the first carpometacarpal junction. No abnormal soft tissue swelling is seen    Previous procedures reviewed -Yes  Previous medical records reviewed/summarized -Yes    ASSESSMENT  1. Seropositive rheumatoid arthritis - This patient continues to do well on his current regimen. He should continue on Methotrexate 20 mg PO weekly. He can stop folic acid 1 mg daily as he does not have associated nausea with MTX use and he does not use this medication regularly. I will order labs today. Follow up in 6 months. 2. Iritis - The patient has not had any recent flares. He continues on Methotrexate 20 mg PO weekly. 3. Drug therapy monitoring for toxicity (methotrexate) - CBC, BUN, Cr, AST, ALT and albumin every 3 months    PLAN  1. Methotrexate 20 mg PO weekly    2. Check CBC & CMP   3. Follow up in 6 months     Total time was 40 minutes, greater than 50% of which was spent in counseling and coordination of care. The diagnosis, treatment and various other items were discussed in detail: Test results, medication options, possible side effects, lifestyle changes. Carlo Haile MD  Adult and Pediatric Rheumatology     [facility], [address] [place] [address], Phone [phone], Fax [phone]     Visiting  of Pediatrics    Department of Pediatrics, [facility] of [address], [address] [place] [address], Phone [phone], Fax [phone]    There are no Patient Instructions on file for this visit.     cc:  Tony Harman MD    Written by carol Curran, as dictated by Dr. Oletta Rinne, M.D.

## 2022-02-23 NOTE — PROGRESS NOTES
Chief Complaint   Patient presents with    Joint Pain     1. Have you been to the ER, urgent care clinic since your last visit? Hospitalized since your last visit? No    2. Have you seen or consulted any other health care providers outside of the [address] [place] [address] since your last visit? Include any pap smears or colon screening.  No

## 2022-03-04 DIAGNOSIS — I10 ESSENTIAL HYPERTENSION: ICD-10-CM

## 2022-03-04 RX ORDER — AMLODIPINE BESYLATE 10 MG/1
TABLET ORAL
Qty: 90 TABLET | Refills: 0 | Status: SHIPPED | OUTPATIENT
Start: 2022-03-04 | End: 2022-04-14 | Stop reason: SDUPTHER

## 2022-03-18 PROBLEM — N18.2 CKD (CHRONIC KIDNEY DISEASE) STAGE 2, GFR 60-89 ML/MIN: Status: ACTIVE | Noted: 2018-05-07

## 2022-03-20 PROBLEM — M17.0 PRIMARY OSTEOARTHRITIS OF BOTH KNEES: Status: ACTIVE | Noted: 2018-05-07

## 2022-04-14 ENCOUNTER — VIRTUAL VISIT (OUTPATIENT)
Dept: FAMILY MEDICINE CLINIC | Age: 67
End: 2022-04-14
Payer: COMMERCIAL

## 2022-04-14 DIAGNOSIS — I10 ESSENTIAL HYPERTENSION: Primary | ICD-10-CM

## 2022-04-14 DIAGNOSIS — R19.7 DIARRHEA, UNSPECIFIED TYPE: ICD-10-CM

## 2022-04-14 DIAGNOSIS — Z12.11 SCREEN FOR COLON CANCER: ICD-10-CM

## 2022-04-14 PROCEDURE — 99443 PR PHYS/QHP TELEPHONE EVALUATION 21-30 MIN: CPT | Performed by: FAMILY MEDICINE

## 2022-04-14 RX ORDER — LOSARTAN POTASSIUM 100 MG/1
100 TABLET ORAL DAILY
Qty: 90 TABLET | Refills: 4 | Status: SHIPPED | OUTPATIENT
Start: 2022-04-14

## 2022-04-14 RX ORDER — AMLODIPINE BESYLATE 10 MG/1
10 TABLET ORAL DAILY
Qty: 90 TABLET | Refills: 4 | Status: SHIPPED | OUTPATIENT
Start: 2022-04-14

## 2022-04-14 NOTE — PROGRESS NOTES
Esme Nava is a 74 y.o. male evaluated via telephone on 4/14/2022. Consent:  He and/or health care decision maker is aware that he may receive a bill for this telephone service, depending on his insurance coverage, and has provided verbal consent to proceed: Yes      Documentation:  I communicated with the patient and/or health care decision maker about his HTN and diarrhea. Details of this discussion including any medical advice provided:       Subjective:   Esme Nava was seen for Hypertension (follow up) and Diarrhea (sicne this morning )      Patient presents with:  Hypertension: follow up  Diarrhea: sicne this morning     He went out to eat last night and started with diarrhea this am.          Review of Systems   Constitutional: Negative for chills and fever. Eyes: Negative for blurred vision. Respiratory: Negative for shortness of breath. Cardiovascular: Negative for chest pain. Gastrointestinal: Positive for diarrhea. Negative for abdominal pain, blood in stool, melena, nausea and vomiting. Neurological: Negative for dizziness, sensory change, speech change, focal weakness and headaches. Objective:     /74    Assessment & Plan:   Diagnoses and all orders for this visit:    1. Essential hypertension  -     amLODIPine (NORVASC) 10 mg tablet; Take 1 Tablet by mouth daily. -     losartan (COZAAR) 100 mg tablet; Take 1 Tablet by mouth daily. 2. Diarrhea, unspecified type    3. Screen for colon cancer  -     REFERRAL TO GASTROENTEROLOGY        Blood pressure controlled  Possible food poisoning  Continue current plans. Refills per orders  Push fluids  Avoid dairy except for yogurt with live active cultures  Colonoscopy     Follow-up and Dispositions    · Return in about 6 months (around 10/14/2022) for blood pressure. Reviewed plan of care. Patient has provided input and agrees with goals.       I  this is a Patient Initiated Episode with an Established Patient who has not had a related appointment within my department in the past 7 days or scheduled within the next 24 hours.     Total Time: minutes: 21-30 minutes    Note: not billable if this call serves to triage the patient into an appointment for the relevant concern      Muriel Amaya MD

## 2022-04-14 NOTE — PROGRESS NOTES
Chief Complaint   Patient presents with    Hypertension     follow up    Diarrhea     sicne this morning

## 2022-08-24 NOTE — TELEPHONE ENCOUNTER
"    CARDIOLOGY        Patient Name: Sachin Tolliver  :1959  Age: 63 y.o.  Primary Cardiologist: Иван Irene MD  Encounter Provider:  MITCHELL Barrientos    Date of Service: 22            CHIEF COMPLAINT / REASON FOR OFFICE VISIT     Coronary Artery Disease (1 yr f/u)      HISTORY OF PRESENT ILLNESS       HPI  Sachin Tolliver is a 63 y.o. male who presents today for annual follow up.     Pt has a  history significant for CAD with prior CABG, aortic valve replacement secondary to aortic valve stenosis.    Patient reports that he has done very well for the past year.  He exercises at the gym 3 days/week without any exertional symptoms.  Blood pressure at home is averaging 110s-120s.  Has occasional episodes of positional lightheadedness but is otherwise asymptomatic and denies chest pain, dyspnea, dyspnea with exertion, palpitations, swelling, edema.    The following portions of the patient's history were reviewed and updated as appropriate: allergies, current medications, past family history, past medical history, past social history, past surgical history and problem list.      VITAL SIGNS     Visit Vitals  /80 (BP Location: Left arm, Patient Position: Sitting, Cuff Size: Large Adult)   Pulse 52   Ht 188 cm (74\")   Wt 117 kg (257 lb 3.2 oz)   SpO2 97%   BMI 33.02 kg/m²         Wt Readings from Last 3 Encounters:   22 117 kg (257 lb 3.2 oz)   22 117 kg (259 lb)   21 118 kg (260 lb)     Body mass index is 33.02 kg/m².      REVIEW OF SYSTEMS   Review of Systems   Constitutional: Negative for chills, fever, weight gain and weight loss.   Cardiovascular: Negative for leg swelling.   Respiratory: Negative for cough, snoring and wheezing.    Hematologic/Lymphatic: Negative for bleeding problem. Does not bruise/bleed easily.   Skin: Negative for color change.   Musculoskeletal: Negative for falls, joint pain and myalgias.   Gastrointestinal: Negative for melena.   Genitourinary: " Called patient and The following appointments have been successfully scheduled:    Date/time Tuesday, January 14, 2020 08:20 AM  Patient Mike Calvert 1955 ([id number] M) #[id number]  Department [facility] OFFICE  Appointment type Established Patient  Provider Deisi Martinez Negative for hematuria.   Neurological: Positive for light-headedness. Negative for excessive daytime sleepiness.   Psychiatric/Behavioral: Negative for depression. The patient is not nervous/anxious.            PHYSICAL EXAMINATION     Constitutional:       Appearance: Normal appearance. Well-developed.   Eyes:      Conjunctiva/sclera: Conjunctivae normal.   Neck:      Vascular: No carotid bruit.   Pulmonary:      Effort: Pulmonary effort is normal.      Breath sounds: Normal breath sounds.   Cardiovascular:      Normal rate. Regular rhythm. Normal S1. Normal S2.      Murmurs: There is no murmur.      No gallop. No click. No rub.   Edema:     Peripheral edema absent.   Musculoskeletal: Normal range of motion. Skin:     General: Skin is warm and dry.   Neurological:      Mental Status: Alert and oriented to person, place, and time.      GCS: GCS eye subscore is 4. GCS verbal subscore is 5. GCS motor subscore is 6.   Psychiatric:         Speech: Speech normal.         Behavior: Behavior normal.         Thought Content: Thought content normal.         Judgment: Judgment normal.           REVIEWED DATA       ECG 12 Lead    Date/Time: 8/24/2022 9:06 AM  Performed by: Nicci Napier APRN  Authorized by: Nicci Napier APRN   Comparison: compared with previous ECG from 8/18/2021  Rhythm: sinus rhythm  Rate: normal  BPM: 52  Conduction: conduction normal  ST Segments: ST segments normal  T Waves: T waves normal  QRS axis: normal  Other findings: non-specific ST-T wave changes    Clinical impression: non-specific ECG            Cardiac Procedures:  1. Echocardiogram 5/30/2017. LVEF 61%. Borderline LVH. Grade 1 diastolic dysfunction. Trace aortic valve regurgitation. Moderate to severe aortic valve stenosis. In mobile right coronary cusp. Mean pressure gradient 44.6 mmHg.  2. Echocardiogram 6/13/2018. LVEF 65%. LV cavity is moderate to severely dilated. LV wall thickness is consistent with mild hypertrophy. Moderate  to severe aortic valve stenosis. Maximum pressure gradient 83.2 mmHg, mean pressure gradient 46.1 mmHg. Compared to 5/30/2017 gradients have not changed significantly however the LV is now dilated.  3. Cardiac catheterization 7/10/2018. Aortic valve stenosis, normal pulmonary pressures, small single small vessel CAD. Admit for surgical AVR.  4. Echocardiogram 9/14/2018. LVEF 52%. Normal LV cavity size and wall thickness. All LV wall segments contract normally. Diastolic function is normal. Porcine bioprosthetic valve present, prosthetic valve is normal. Trace to mild tricuspid valve regurgitation. Estimated RVSP less than 35 mmHg.  5. Echocardiogram 1/9/2020. LVEF 57%. Normal LV cavity size and wall thickness. All LV wall segments normally. 29 mm porcine bioprosthetic valve. Peak and mean gradients are within defined limits. No significant prosthetic valve stenosis or regurgitation. Trace mitral valve regurgitation. Mild tricuspid valve regurgitation. Calculated RVSP 24 mmHg. Mild dilation of ascending aorta. Ascending aorta measures 3.8 cm.  6. Myocardial perfusion stress test 6/2/2020. Normal myocardial perfusion study without evidence of ischemia. Impressions consistent with low risk study.      Lipid Panel    Lipid Panel 8/31/21 8/12/22   Total Cholesterol 142    Total Cholesterol  166   Triglycerides 72 90   HDL Cholesterol 48 46   VLDL Cholesterol 14 17   LDL Cholesterol  80 103 (A)   LDL/HDL Ratio 1.66 2.2   (A) Abnormal value       Comments are available for some flowsheets but are not being displayed.           BUN   Date Value Ref Range Status   08/12/2022 13 8 - 27 mg/dL Final   08/24/2018 13 8 - 23 mg/dL Final     Creatinine   Date Value Ref Range Status   08/12/2022 1.14 0.76 - 1.27 mg/dL Final   12/27/2019 1.10 0.60 - 1.30 mg/dL Final     Comment:     Serial Number: [id number]:  [id number]     Potassium   Date Value Ref Range Status   08/12/2022 4.9 3.5 - 5.2 mmol/L Final   08/24/2018 4.3 3.5 - 5.2  mmol/L Final     ALT (SGPT)   Date Value Ref Range Status   08/12/2022 28 0 - 44 IU/L Final   08/24/2018 49 (H) 1 - 41 U/L Final     AST (SGOT)   Date Value Ref Range Status   08/12/2022 33 0 - 40 IU/L Final   08/24/2018 26 1 - 40 U/L Final           ASSESSMENT & PLAN     Diagnoses and all orders for this visit:    1. Coronary artery disease involving coronary bypass graft of native heart without angina pectoris (Primary)  -     ECG 12 Lead  2. S/P CABG x 1  · Currently denies angina or dyspnea  · Exercising 3 days weekly without exertional symptoms  · Continue GDMT with atorvastatin, metoprolol tartrate, aspirin    3. S/P aortic valve replacement with tissue  · Currently asymptomatic  · Due for surveillance echocardiogram next year, this has been ordered  -     Adult Transthoracic Echo Complete W/ Cont if Necessary Per Protocol; Future    4. Benign essential hypertension  · BP controlled 118/80  · Continue metoprolol tartrate 50 mg twice per day          Return in about 1 year (around 8/24/2023) for Dr. Irene- Routine with echo before.    Future Appointments       Provider Department Center    2/10/2023 9:00 AM Milvia Briceno MD [facility] PRIMARY CARE [place]    8/21/2023 7:45 AM [facility] OUTPATIENT ECHOCARDIOGRAPHY [place]    8/21/2023 8:20 AM Иван Irene MD [facility] CARDIOLOGY [place]                MEDICATIONS         Discharge Medications          Accurate as of August 24, 2022  9:31 AM. If you have any questions, ask your nurse or doctor.            Continue These Medications      Instructions Start Date   aspirin 81 MG tablet   Oral      atorvastatin 40 MG tablet  Commonly known as: LIPITOR   Take 1 tablet by mouth every night.      butalbital-acetaminophen-caffeine -40 MG per tablet  Commonly known as: FIORICET, ESGIC   Take 1 tablet by mouth every 6 (Six) hours as needed for headache.      citalopram 20 MG  tablet  Commonly known as: CeleXA   20 mg, Oral, Daily      GLUCOSAMINE CHONDR 1500 COMPLX PO   Oral      Melatonin 5 MG capsule   Oral, Nightly PRN      metoprolol tartrate 50 MG tablet  Commonly known as: LOPRESSOR   50 mg, Oral, 2 Times Daily      MULTIVITAMINS PO   Oral                 **Dragon Disclaimer:   Much of this encounter note is an electronic transcription/translation of spoken language to printed text. The electronic translation of spoken language may permit erroneous, or at times, nonsensical words or phrases to be inadvertently transcribed. Although I have reviewed the note for such errors, some may still exist.

## 2022-10-04 ENCOUNTER — TELEPHONE (OUTPATIENT)
Dept: RHEUMATOLOGY | Age: 67
End: 2022-10-04

## 2022-10-04 NOTE — TELEPHONE ENCOUNTER
Patient called for a refill of methotrexate. Advised patient will need to have updated labs and a follow up appointment before refill. He did state understanding, but was adamant about a refill. His last appointment was in 2/22, and 6mo would have been in 8/22. No labs since 2021. He was just booked for an appointment in 12/22.
no

## 2022-10-17 ENCOUNTER — TELEPHONE (OUTPATIENT)
Dept: RHEUMATOLOGY | Age: 67
End: 2022-10-17

## 2022-10-17 NOTE — TELEPHONE ENCOUNTER
Pt left a vm to receive a call back. Returned pt call, pt stated he has been without Methotrexate for 2 weeks. Please advise. Pt has a follow up scheduled for 12/5. No current labs.     [phone]

## 2022-10-21 NOTE — TELEPHONE ENCOUNTER
Spoke to pt informed pt per Dr Barth Nicely that the labs that were ordered on 02/23/22 needs to be completed before the refills for medication can be submitted, pt verbally acknowledged understanding and stated that he will get the labs done on Monday

## 2022-10-21 NOTE — TELEPHONE ENCOUNTER
Pt called to follow up on previous message about Methotrexate. Pt stated he would like to receive a prescription to hold him over until his appt in December. Pt also stated he now has pain in his right shoulder and wrist, he would like a prescription of Prednisone as well. Please advise.     [phone]

## 2022-10-25 LAB
ALBUMIN SERPL-MCNC: 4.4 G/DL (ref 3.8–4.8)
ALBUMIN/GLOB SERPL: 1.3 {RATIO} (ref 1.2–2.2)
ALP SERPL-CCNC: 98 IU/L (ref 44–121)
ALT SERPL-CCNC: 15 IU/L (ref 0–44)
AST SERPL-CCNC: 17 IU/L (ref 0–40)
BASOPHILS # BLD AUTO: 0.1 X10E3/UL (ref 0–0.2)
BASOPHILS NFR BLD AUTO: 1 %
BILIRUB SERPL-MCNC: 0.6 MG/DL (ref 0–1.2)
BUN SERPL-MCNC: 18 MG/DL (ref 8–27)
BUN/CREAT SERPL: 13 (ref 10–24)
CALCIUM SERPL-MCNC: 9.1 MG/DL (ref 8.6–10.2)
CHLORIDE SERPL-SCNC: 102 MMOL/L (ref 96–106)
CO2 SERPL-SCNC: 23 MMOL/L (ref 20–29)
CREAT SERPL-MCNC: 1.44 MG/DL (ref 0.76–1.27)
EGFR: 53 ML/MIN/1.73
EOSINOPHIL # BLD AUTO: 0.2 X10E3/UL (ref 0–0.4)
EOSINOPHIL NFR BLD AUTO: 3 %
ERYTHROCYTE [DISTWIDTH] IN BLOOD BY AUTOMATED COUNT: 15.3 % (ref 11.6–15.4)
GLOBULIN SER CALC-MCNC: 3.3 G/DL (ref 1.5–4.5)
GLUCOSE SERPL-MCNC: 87 MG/DL (ref 70–99)
HCT VFR BLD AUTO: 37.6 % (ref 37.5–51)
HGB BLD-MCNC: 13 G/DL (ref 13–17.7)
IMM GRANULOCYTES # BLD AUTO: 0 X10E3/UL (ref 0–0.1)
IMM GRANULOCYTES NFR BLD AUTO: 0 %
LYMPHOCYTES # BLD AUTO: 2 X10E3/UL (ref 0.7–3.1)
LYMPHOCYTES NFR BLD AUTO: 27 %
MCH RBC QN AUTO: 33.5 PG (ref 26.6–33)
MCHC RBC AUTO-ENTMCNC: 34.6 G/DL (ref 31.5–35.7)
MCV RBC AUTO: 97 FL (ref 79–97)
MONOCYTES # BLD AUTO: 1 X10E3/UL (ref 0.1–0.9)
MONOCYTES NFR BLD AUTO: 14 %
NEUTROPHILS # BLD AUTO: 4.1 X10E3/UL (ref 1.4–7)
NEUTROPHILS NFR BLD AUTO: 55 %
PLATELET # BLD AUTO: 293 X10E3/UL (ref 150–450)
POTASSIUM SERPL-SCNC: 4.3 MMOL/L (ref 3.5–5.2)
PROT SERPL-MCNC: 7.7 G/DL (ref 6–8.5)
RBC # BLD AUTO: 3.88 X10E6/UL (ref 4.14–5.8)
SODIUM SERPL-SCNC: 139 MMOL/L (ref 134–144)
WBC # BLD AUTO: 7.4 X10E3/UL (ref 3.4–10.8)

## 2022-10-28 RX ORDER — METHOTREXATE 2.5 MG/1
20 TABLET ORAL
Qty: 32 TABLET | Refills: 1 | Status: SHIPPED | OUTPATIENT
Start: 2022-10-30 | End: 2022-11-23 | Stop reason: SDUPTHER

## 2022-11-01 DIAGNOSIS — M05.79 SEROPOSITIVE RHEUMATOID ARTHRITIS OF MULTIPLE SITES (HCC): ICD-10-CM

## 2022-11-01 RX ORDER — PREDNISONE 20 MG/1
20 TABLET ORAL
Qty: 7 TABLET | Refills: 0 | Status: SHIPPED | OUTPATIENT
Start: 2022-11-01

## 2022-11-23 NOTE — TELEPHONE ENCOUNTER
Received 90 day refill request for methotrexate 2.5mg tablet    Last visit 2/23/22  Follow up scheduled for 12/5/22  Lab Results   Component Value Date/Time    Sodium 139 10/24/2022 03:43 PM    Potassium 4.3 10/24/2022 03:43 PM    Chloride 102 10/24/2022 03:43 PM    CO2 23 10/24/2022 03:43 PM    Anion gap 8 05/05/2021 09:21 AM    Glucose 87 10/24/2022 03:43 PM    BUN 18 10/24/2022 03:43 PM    Creatinine 1.44 (H) 10/24/2022 03:43 PM    BUN/Creatinine ratio 13 10/24/2022 03:43 PM    GFR est AA 67 08/27/2021 08:53 AM    GFR est non-AA 58 (L) 08/27/2021 08:53 AM    Calcium 9.1 10/24/2022 03:43 PM    Bilirubin, total 0.6 10/24/2022 03:43 PM    Alk.  phosphatase 98 10/24/2022 03:43 PM    Protein, total 7.7 10/24/2022 03:43 PM    Albumin 4.4 10/24/2022 03:43 PM    Globulin 4.2 (H) 05/05/2021 09:21 AM    A-G Ratio 1.3 10/24/2022 03:43 PM    ALT (SGPT) 15 10/24/2022 03:43 PM    AST (SGOT) 17 10/24/2022 03:43 PM     Lab Results   Component Value Date/Time    WBC 7.4 10/24/2022 03:43 PM    HGB 13.0 10/24/2022 03:43 PM    HCT 37.6 10/24/2022 03:43 PM    PLATELET 460 41/09/5830 03:43 PM    MCV 97 10/24/2022 03:43 PM

## 2022-11-28 RX ORDER — METHOTREXATE 2.5 MG/1
20 TABLET ORAL
Qty: 32 TABLET | Refills: 0 | Status: SHIPPED | OUTPATIENT
Start: 2022-12-04

## 2022-12-05 ENCOUNTER — OFFICE VISIT (OUTPATIENT)
Dept: RHEUMATOLOGY | Age: 67
End: 2022-12-05
Payer: COMMERCIAL

## 2022-12-05 VITALS
OXYGEN SATURATION: 97 % | BODY MASS INDEX: 29.99 KG/M2 | DIASTOLIC BLOOD PRESSURE: 75 MMHG | SYSTOLIC BLOOD PRESSURE: 147 MMHG | TEMPERATURE: 97.7 F | RESPIRATION RATE: 16 BRPM | HEART RATE: 61 BPM | WEIGHT: 209 LBS

## 2022-12-05 DIAGNOSIS — M05.79 SEROPOSITIVE RHEUMATOID ARTHRITIS OF MULTIPLE SITES (HCC): Primary | ICD-10-CM

## 2022-12-05 DIAGNOSIS — Z79.60 LONG-TERM USE OF IMMUNOSUPPRESSANT MEDICATION: ICD-10-CM

## 2022-12-05 PROCEDURE — 1123F ACP DISCUSS/DSCN MKR DOCD: CPT | Performed by: PEDIATRICS

## 2022-12-05 PROCEDURE — 3074F SYST BP LT 130 MM HG: CPT | Performed by: PEDIATRICS

## 2022-12-05 PROCEDURE — 99214 OFFICE O/P EST MOD 30 MIN: CPT | Performed by: PEDIATRICS

## 2022-12-05 PROCEDURE — 3078F DIAST BP <80 MM HG: CPT | Performed by: PEDIATRICS

## 2022-12-05 RX ORDER — METHOTREXATE 2.5 MG/1
15 TABLET ORAL
Qty: 24 TABLET | Refills: 4 | Status: SHIPPED | OUTPATIENT
Start: 2022-12-11

## 2022-12-05 NOTE — PROGRESS NOTES
RHEUMATOLOGY PROBLEM LIST AND CHIEF COMPLAINT  1. Seropositive rheumatoid arthritis - CCP (>250), RF (179.6)  2. Iritis     Immunosuppression Screening (6/04/2019): Quantiferon TB: negative  PPD:  Not performed  Hepatitis B: negative  Hepatitis C: negative     Therapy History includes:  Current DMARD therapy include: methotrexate 20 mg every Tuesday (11/24/2020 to present)  Prior DMARD therapy include: methotrexate 15 mg every Tuesday  Discontinued DMARDs because of inefficacy: None  Discontinued DMARDs because of side effects: none    INTERVAL HISTORY  This is a 69 y.o.  male. Today, the patient complains of pain in the joints. Location: none  Severity: 0 on a scale of 0-10  Timing: none   Context/Associated signs and symptoms: The patient was last seen 2/23/22. He continues on methotrexate and has been taking 15 mg PO weekly. He went a month without medication since he did not have lab work and notes recurrence of joint symptoms. Reviewed October lab work including elevated creatinine (1.44). Notes that he is s/p nephrectomy due to kidney cancer.      RHEUMATOLOGY REVIEW OF SYSTEMS   Positives as per history  Negatives as follows:  Kary Sarai:  Denies unexplained persistent fevers, weight change, chronic fatigue  HEAD/EYES:   Denies eye redness, blurry vision or sudden loss of vision, dry eyes, HA, temporal artery pain  ENT:    Denies oral/nasal ulcers, recurrent sinus infections, dry mouth  RESPIRATORY:  No pleuritic pain, history of pleural effusions, hemoptysis, exertional dyspnea  CARDIOVASCULAR: Denies chest pain, history of pericardial effusions  GASTRO:   Denies heartburn, esophageal dysmotility, abdominal pain, nausea, vomiting, diarrhea, blood in the stool  HEMATOLOGIC:  No easy bruising, purpura, swollen lymph nodes  SKIN:    Denies alopecia, ulcers, nodules, sun sensitivity, unexplained persistent rash   VASCULAR:   Denies edema, cyanosis, raynaud phenomenon  NEUROLOGIC:  Denies specific muscle weakness, paresthesias   PSYCHIATRIC:  No sleep disturbance / snoring, depression, anxiety  MSK:    No morning stiffness >1 hour, SI joint pain, persistent joint swelling, persistent joint pain    PAST MEDICAL HISTORY  Reviewed with patient, significant changes in medical history - no    He has a past medical history of BPH (benign prostatic hypertrophy) (11/19/2012), Eczema (11/19/2012), Elevated cholesterol (11/19/2012), GERD (gastroesophageal reflux disease) (11/11/2016), History of renal cell cancer (11/19/2012), HTN (hypertension) (11/19/2012), Iritis (11/19/2012), RA (rheumatoid arthritis) ([facility] (11/19/2012), and Renal cell cancer ([facility] (11/19/2012). FAMILY HISTORY  rheumatoid arthritis - sister    PHYSICAL EXAM  There were no vitals taken for this visit. GENERAL APPEARANCE: Well-nourished, no acute distress  EYES: No scleral erythema, conjunctival injection  ENT: No oral ulcer, parotid enlargement  NECK: No adenopathy, thyroid enlargement  CARDIOVASCULAR: Heart rhythm is regular. No murmur, rub, gallop  CHEST: Normal vesicular breath sounds. No wheezes, rales, pleural friction rubs  ABDOMINAL: The abdomen is soft and nontender. Bowel sounds are normal  EXTREMITIES: There is no evidence of clubbing, cyanosis, edema  SKIN: No rash, palpable purpura, digital ulcer, abnormal thickening, normal nailfold capillaries   NEUROLOGICAL: Normal gait and station, full strength in upper and lower extremities,  normal sensation to light touch  MUSCULOSKELETAL:   Upper extremities - full range of motion, no tenderness, no swelling, no synovial thickening and no deformity of joints except OA changes and puffiness in the PIPs.    Lower extremities - full range of motion, no tenderness, no swelling, no synovial thickening and no deformity of joints     LABS, RADIOLOGY AND PROCEDURES  Previous labs reviewed -Yes  Previous radiology reviewed -Yes    Right Elbow 5/07/2018: no fracture, dislocation, effusion or other acute abnormality. Moderate joint space narrowing, mild spurring of the olecranon process. No bony erosion, no fracture. The bone is normal in mineral content. No joint effusion    Chest 9/22/2016: The cardiopericardial silhouette is within normal limits. There is no pleural effusion, pneumothorax or focal consolidation present. Bilateral Hand 3/15/2013: LEFT: Density appears normal. No articular erosions are seen. No abnormal soft tissue swelling is seen. RIGHT: Bone density is normal. No joint erosions are seen. There are minor osteoarthritic changes at the first carpometacarpal junction. No abnormal soft tissue swelling is seen    Previous procedures reviewed -Yes  Previous medical records reviewed/summarized -Yes    ASSESSMENT  1. Seropositive rheumatoid arthritis - This patient continues to do well on his current regimen. He should continue on methotrexate 15 mg PO weekly. We will not increase methotrexate in the future if he has worsening joint symptoms or eye symptoms since he only has one kidney. No labs needed today. 2. Iritis - The patient has not had any recent flares. He continues on Methotrexate 15 mg PO weekly. 3. Drug therapy monitoring for toxicity (methotrexate) - CBC, BUN, Cr, AST, ALT and albumin every 3 months    PLAN  1. Methotrexate 15 mg PO weekly    2. Follow up in 4 months     Carlo Galeas MD  Adult and Pediatric Rheumatology     [facility], [address] [place] [address], Phone [phone], Fax [phone]    Visiting  of Pediatrics    Department of Pediatrics, [facility] of [address] [place] [address], Phone [phone], Fax [phone]    There are no Patient Instructions on file for this visit.     cc:  Karen Mcqueen MD    I, Queen Marely MD, personally performed the services described in the documentation as scribed by Jackie Case in my presence and have reviewed and agree with the note as scribed.

## 2022-12-05 NOTE — PROGRESS NOTES
Chief Complaint   Patient presents with    Joint Pain     1. Have you been to the ER, urgent care clinic since your last visit? Hospitalized since your last visit? No    2. Have you seen or consulted any other health care providers outside of the [address] [place] [address] since your last visit? Include any pap smears or colon screening.  No

## 2023-01-09 RX ORDER — METHOTREXATE 2.5 MG/1
20 TABLET ORAL
Qty: 32 TABLET | Refills: 1 | Status: SHIPPED | OUTPATIENT
Start: 2023-01-15

## 2023-02-20 DIAGNOSIS — M05.79 SEROPOSITIVE RHEUMATOID ARTHRITIS OF MULTIPLE SITES (HCC): Primary | ICD-10-CM

## 2023-02-20 RX ORDER — METHOTREXATE 2.5 MG/1
20 TABLET ORAL
Qty: 32 TABLET | Refills: 0 | Status: SHIPPED | OUTPATIENT
Start: 2023-02-26

## 2023-02-20 NOTE — TELEPHONE ENCOUNTER
Received 90 day refill request for Methotrexate 2.5mg tablets. Last visit was 12/5/22  Follow up scheduled for 4/5/23    Lab Results   Component Value Date/Time    Sodium 139 10/24/2022 03:43 PM    Potassium 4.3 10/24/2022 03:43 PM    Chloride 102 10/24/2022 03:43 PM    CO2 23 10/24/2022 03:43 PM    Anion gap 8 05/05/2021 09:21 AM    Glucose 87 10/24/2022 03:43 PM    BUN 18 10/24/2022 03:43 PM    Creatinine 1.44 (H) 10/24/2022 03:43 PM    BUN/Creatinine ratio 13 10/24/2022 03:43 PM    GFR est AA 67 08/27/2021 08:53 AM    GFR est non-AA 58 (L) 08/27/2021 08:53 AM    Calcium 9.1 10/24/2022 03:43 PM    Bilirubin, total 0.6 10/24/2022 03:43 PM    Alk.  phosphatase 98 10/24/2022 03:43 PM    Protein, total 7.7 10/24/2022 03:43 PM    Albumin 4.4 10/24/2022 03:43 PM    Globulin 4.2 (H) 05/05/2021 09:21 AM    A-G Ratio 1.3 10/24/2022 03:43 PM    ALT (SGPT) 15 10/24/2022 03:43 PM    AST (SGOT) 17 10/24/2022 03:43 PM     Lab Results   Component Value Date/Time    WBC 7.4 10/24/2022 03:43 PM    HGB 13.0 10/24/2022 03:43 PM    HCT 37.6 10/24/2022 03:43 PM    PLATELET 968 87/15/0931 03:43 PM    MCV 97 10/24/2022 03:43 PM

## 2023-04-05 ENCOUNTER — OFFICE VISIT (OUTPATIENT)
Dept: RHEUMATOLOGY | Age: 68
End: 2023-04-05

## 2023-04-05 NOTE — PROGRESS NOTES
RHEUMATOLOGY PROBLEM LIST AND CHIEF COMPLAINT  1. Seropositive rheumatoid arthritis - CCP (>250), RF (179.6)  2. Iritis     Immunosuppression Screening (6/04/2019): Quantiferon TB: negative  PPD:  Not performed  Hepatitis B: negative  Hepatitis C: negative     Therapy History includes:  Current DMARD therapy include: methotrexate 20 mg every Tuesday (11/24/2020 to present)  Prior DMARD therapy include: methotrexate 15 mg every Tuesday  Discontinued DMARDs because of inefficacy: None  Discontinued DMARDs because of side effects: none    INTERVAL HISTORY  This is a 69 y.o.  male. Today, the patient complains of pain in the joints. Location: none  Severity: 0 on a scale of 0-10  Timing: none   Context/Associated signs and symptoms: The patient has been doing well. He continues on methotrexate 20 mg PO weekly. He remains active playing tennis. He denies any new medical problems or new medications. Reviewed October lab work including elevated creatinine (1.44).      RHEUMATOLOGY REVIEW OF SYSTEMS   Positives as per history  Negatives as follows:  Gallo Mutton:  Denies unexplained persistent fevers, weight change, chronic fatigue  HEAD/EYES:   Denies eye redness, blurry vision or sudden loss of vision, dry eyes, HA, temporal artery pain  ENT:    Denies oral/nasal ulcers, recurrent sinus infections, dry mouth  RESPIRATORY:  No pleuritic pain, history of pleural effusions, hemoptysis, exertional dyspnea  CARDIOVASCULAR: Denies chest pain, history of pericardial effusions  GASTRO:   Denies heartburn, esophageal dysmotility, abdominal pain, nausea, vomiting, diarrhea, blood in the stool  HEMATOLOGIC:  No easy bruising, purpura, swollen lymph nodes  SKIN:    Denies alopecia, ulcers, nodules, sun sensitivity, unexplained persistent rash   VASCULAR:   Denies edema, cyanosis, raynaud phenomenon  NEUROLOGIC:  Denies specific muscle weakness, paresthesias   PSYCHIATRIC:  No sleep disturbance / snoring, depression, anxiety  MSK:    No morning stiffness >1 hour, SI joint pain, persistent joint swelling, persistent joint pain    PAST MEDICAL HISTORY  Reviewed with patient, significant changes in medical history - no    He has a past medical history of BPH (benign prostatic hypertrophy) (11/19/2012), Eczema (11/19/2012), Elevated cholesterol (11/19/2012), GERD (gastroesophageal reflux disease) (11/11/2016), History of renal cell cancer (11/19/2012), HTN (hypertension) (11/19/2012), Iritis (11/19/2012), RA (rheumatoid arthritis) ([facility] (11/19/2012), and Renal cell cancer ([facility] (11/19/2012). FAMILY HISTORY  rheumatoid arthritis - sister    PHYSICAL EXAM  Blood pressure (!) 129/53, pulse 71, temperature 97.6 °F (36.4 °C), temperature source Oral, resp. rate 16, weight 208 lb (94.3 kg), SpO2 97 %. GENERAL APPEARANCE: Well-nourished, no acute distress  EYES: No scleral erythema, conjunctival injection  ENT: No oral ulcer, parotid enlargement  NECK: No adenopathy, thyroid enlargement  CARDIOVASCULAR: Heart rhythm is regular. No murmur, rub, gallop  CHEST: Normal vesicular breath sounds. No wheezes, rales, pleural friction rubs  ABDOMINAL: The abdomen is soft and nontender. Bowel sounds are normal  EXTREMITIES: There is no evidence of clubbing, cyanosis, edema  SKIN: No rash, palpable purpura, digital ulcer, abnormal thickening, normal nailfold capillaries   NEUROLOGICAL: Normal gait and station, full strength in upper and lower extremities,  normal sensation to light touch  MUSCULOSKELETAL:   Upper extremities - full range of motion, no tenderness, no swelling, no synovial thickening and no deformity of joints except OA changes and puffiness in the PIPs.    Lower extremities - full range of motion, no tenderness, no swelling, no synovial thickening and no deformity of joints     LABS, RADIOLOGY AND PROCEDURES  Previous labs reviewed -Yes  Previous radiology reviewed -Yes    Right Elbow 5/07/2018: no fracture, dislocation, effusion or other acute abnormality. Moderate joint space narrowing, mild spurring of the olecranon process. No bony erosion, no fracture. The bone is normal in mineral content. No joint effusion    Chest 9/22/2016: The cardiopericardial silhouette is within normal limits. There is no pleural effusion, pneumothorax or focal consolidation present. Bilateral Hand 3/15/2013: LEFT: Density appears normal. No articular erosions are seen. No abnormal soft tissue swelling is seen. RIGHT: Bone density is normal. No joint erosions are seen. There are minor osteoarthritic changes at the first carpometacarpal junction. No abnormal soft tissue swelling is seen    Previous procedures reviewed -Yes  Previous medical records reviewed/summarized -Yes    ASSESSMENT  1. Seropositive rheumatoid arthritis - This patient continues to do well on his current regimen. He will reduce methotrexate to 15 mg PO weekly due to elevated creatinine. We will not increase methotrexate in the future if he has worsening joint symptoms or eye symptoms since he only has one kidney. Lab work ordered today. 2. Iritis - The patient has not had any recent flares. He will decrease methotrexate to 15 mg PO weekly. 3. Drug therapy monitoring for toxicity (methotrexate) - CBC, BUN, Cr, AST, ALT and albumin every 3 months    PLAN  1. Decrease methotrexate to 15 mg PO weekly    2. Check CBC, CMP  3. Follow up in 4-5 months     Carlo Romero MD  Adult and Pediatric Rheumatology     [facility], [address], Phone [phone], Fax [phone]    Visiting  of Pediatrics    Department of Pediatrics, [facility] of [address] [place] [address], [address] [place] [address], Phone [phone], Fax [phone]    There are no Patient Instructions on file for this visit.     cc:  MD BEKA Romero, Celso Saeed MD, personally performed the services described in the documentation as scribed by Joshua Macias in my presence and have reviewed and agree with the note as scribed.

## 2023-04-05 NOTE — PROGRESS NOTES
Chief Complaint   Patient presents with    Joint Pain     1. Have you been to the ER, urgent care clinic since your last visit? Hospitalized since your last visit? No    2. Have you seen or consulted any other health care providers outside of the [address] [place] [address] since your last visit? Include any pap smears or colon screening.  No

## 2023-04-18 DIAGNOSIS — I10 ESSENTIAL HYPERTENSION: ICD-10-CM

## 2023-04-18 RX ORDER — AMLODIPINE BESYLATE 10 MG/1
TABLET ORAL
Qty: 90 TABLET | Refills: 3 | Status: SHIPPED | OUTPATIENT
Start: 2023-04-18

## 2023-04-26 ENCOUNTER — TELEPHONE (OUTPATIENT)
Dept: FAMILY MEDICINE CLINIC | Age: 68
End: 2023-04-26

## 2023-04-26 NOTE — TELEPHONE ENCOUNTER
[facility]ed [facility]... It says \"Pt requests new RX, Pt says he is supposed to get medication sent but not sure what it is\"    PT thinks something should be refilled.     Darío Hutson

## 2023-04-28 DIAGNOSIS — I10 ESSENTIAL HYPERTENSION: ICD-10-CM

## 2023-04-28 NOTE — TELEPHONE ENCOUNTER
Called pt, and left a voice message, asking that he call the office back and advised of which medications needed to be refill and which pharmacy they will be going to.

## 2023-05-01 RX ORDER — LOSARTAN POTASSIUM 100 MG/1
100 TABLET ORAL DAILY
Qty: 90 TABLET | Refills: 3 | Status: SHIPPED | OUTPATIENT
Start: 2023-05-01

## 2023-06-30 DIAGNOSIS — M05.79 RHEUMATOID ARTHRITIS WITH RHEUMATOID FACTOR OF MULTIPLE SITES WITHOUT ORGAN OR SYSTEMS INVOLVEMENT (HCC): ICD-10-CM

## 2023-07-12 NOTE — TELEPHONE ENCOUNTER
Last visit 04/05/23 Next visit 08/21/23  Lab Results   Component Value Date     10/24/2022    K 4.3 10/24/2022     10/24/2022    CO2 23 10/24/2022    BUN 18 10/24/2022    CREATININE 1.44 (H) 10/24/2022    GLUCOSE 87 10/24/2022    CALCIUM 9.1 10/24/2022    PROT 7.7 10/24/2022    LABALBU 4.4 10/24/2022    BILITOT 0.6 10/24/2022    ALKPHOS 98 10/24/2022    AST 17 10/24/2022    ALT 15 10/24/2022    LABGLOM 53 (L) 10/24/2022    GFRAA 67 08/27/2021    AGRATIO 1.3 10/24/2022    GLOB 4.2 (H) 05/05/2021     Lab Results   Component Value Date    WBC 7.4 10/24/2022    HGB 13.0 10/24/2022    HCT 37.6 10/24/2022    MCV 97 10/24/2022     10/24/2022

## 2023-07-12 NOTE — TELEPHONE ENCOUNTER
PT called and requested a refill on Methotrexate and he'd like [facility] [address]. PT stated he's out of meds.

## 2023-07-13 DIAGNOSIS — M05.79 RHEUMATOID ARTHRITIS WITH RHEUMATOID FACTOR OF MULTIPLE SITES WITHOUT ORGAN OR SYSTEMS INVOLVEMENT (HCC): Primary | ICD-10-CM

## 2023-07-27 ENCOUNTER — TELEPHONE (OUTPATIENT)
Age: 68
End: 2023-07-27

## 2023-07-27 LAB
BASOPHILS # BLD AUTO: 0 X10E3/UL (ref 0–0.2)
BASOPHILS NFR BLD AUTO: 1 %
EOSINOPHIL # BLD AUTO: 0.3 X10E3/UL (ref 0–0.4)
EOSINOPHIL NFR BLD AUTO: 5 %
ERYTHROCYTE [DISTWIDTH] IN BLOOD BY AUTOMATED COUNT: 15.9 % (ref 11.6–15.4)
HCT VFR BLD AUTO: 37.4 % (ref 37.5–51)
HGB BLD-MCNC: 12.5 G/DL (ref 13–17.7)
IMM GRANULOCYTES # BLD AUTO: 0 X10E3/UL (ref 0–0.1)
IMM GRANULOCYTES NFR BLD AUTO: 0 %
LYMPHOCYTES # BLD AUTO: 1.9 X10E3/UL (ref 0.7–3.1)
LYMPHOCYTES NFR BLD AUTO: 31 %
MCH RBC QN AUTO: 33.3 PG (ref 26.6–33)
MCHC RBC AUTO-ENTMCNC: 33.4 G/DL (ref 31.5–35.7)
MCV RBC AUTO: 100 FL (ref 79–97)
MONOCYTES # BLD AUTO: 0.7 X10E3/UL (ref 0.1–0.9)
MONOCYTES NFR BLD AUTO: 11 %
NEUTROPHILS # BLD AUTO: 3.2 X10E3/UL (ref 1.4–7)
NEUTROPHILS NFR BLD AUTO: 52 %
PLATELET # BLD AUTO: 254 X10E3/UL (ref 150–450)
RBC # BLD AUTO: 3.75 X10E6/UL (ref 4.14–5.8)
WBC # BLD AUTO: 6.2 X10E3/UL (ref 3.4–10.8)

## 2023-07-27 NOTE — TELEPHONE ENCOUNTER
Patient called stated he had gone and went and got labs done today and he needs his rx refill for methotrexate 2.5 mg advise pt physician will be out of the office from July until August 6. Pt call back number is [phone].  Sdh

## 2023-07-28 LAB
ALBUMIN SERPL-MCNC: 3.8 G/DL (ref 3.9–4.9)
ALBUMIN/GLOB SERPL: 1 {RATIO} (ref 1.2–2.2)
ALP SERPL-CCNC: 111 IU/L (ref 44–121)
ALT SERPL-CCNC: 30 IU/L (ref 0–44)
AST SERPL-CCNC: 40 IU/L (ref 0–40)
BILIRUB SERPL-MCNC: 0.6 MG/DL (ref 0–1.2)
BUN SERPL-MCNC: 14 MG/DL (ref 8–27)
BUN/CREAT SERPL: 11 (ref 10–24)
CALCIUM SERPL-MCNC: 9 MG/DL (ref 8.6–10.2)
CHLORIDE SERPL-SCNC: 101 MMOL/L (ref 96–106)
CO2 SERPL-SCNC: 22 MMOL/L (ref 20–29)
CREAT SERPL-MCNC: 1.25 MG/DL (ref 0.76–1.27)
EGFRCR SERPLBLD CKD-EPI 2021: 62 ML/MIN/1.73
GLOBULIN SER CALC-MCNC: 3.9 G/DL (ref 1.5–4.5)
GLUCOSE SERPL-MCNC: 94 MG/DL (ref 70–99)
POTASSIUM SERPL-SCNC: 3.9 MMOL/L (ref 3.5–5.2)
PROT SERPL-MCNC: 7.7 G/DL (ref 6–8.5)
SODIUM SERPL-SCNC: 137 MMOL/L (ref 134–144)

## 2023-08-04 ENCOUNTER — NURSE TRIAGE (OUTPATIENT)
Dept: OTHER | Facility: CLINIC | Age: 68
End: 2023-08-04

## 2023-08-04 NOTE — TELEPHONE ENCOUNTER
Location of patient: [address] [facility] [place] call from Vijaya at [facility] with [employer]. Subjective: Caller states \"My L leg is swollen\"     Current Symptoms: Entire leg is swollen from ankle to thigh. No known injury. No chest pain or SOB at time of call. SOB at times with going up the steps. Onset: a few weeks ago; worsening    Associated Symptoms: NA    Pain Severity: tightness      Temperature: denies fever     LMP: NA Pregnant: NA    Recommended disposition: Go to ED/UCC Now (Or to Office with PCP Approval)    Care advice provided, patient verbalizes understanding; denies any other questions or concerns; instructed to call back for any new or worsening symptoms. Writer provided warm transfer to Imer Dias at [facility] [employer]  for second level triage. Attention Provider: Thank you for allowing me to participate in the care of your patient. The patient was connected to triage in response to information provided to the ECC/PSC. Please do not respond through this encounter as the response is not directed to a shared pool.     Reason for Disposition   SEVERE swelling (e.g., swelling extends above knee, entire leg is swollen, weeping fluid)    Protocols used: Leg Swelling and Edema-ADULT-[place]

## 2023-08-07 ENCOUNTER — TELEPHONE (OUTPATIENT)
Age: 68
End: 2023-08-07

## 2023-08-07 RX ORDER — PREDNISONE 5 MG/1
TABLET ORAL
Qty: 30 TABLET | Refills: 1 | Status: SHIPPED | OUTPATIENT
Start: 2023-08-07 | End: 2023-09-07

## 2023-08-07 RX ORDER — FOLIC ACID 1 MG/1
1000 TABLET ORAL DAILY
Qty: 30 TABLET | Refills: 4 | Status: SHIPPED | OUTPATIENT
Start: 2023-08-07

## 2023-08-07 NOTE — TELEPHONE ENCOUNTER
Patient is calling to check the status of his Methotraxate per patient he has already has his labs done waiting on  to review them also patient is requesting Predisone to be sent as well, per patient he is aching all over.  Please advise [phone]

## 2023-08-07 NOTE — TELEPHONE ENCOUNTER
Spoke to pt informed pt per Dr Bebe Bello that the MTX and the folic acid has been sent to the pharmacy. Pt stated that he also requested a prednisone taper, pt was informed that the message will be sent to Dr Bebe Bello to address.  Pt verbally acknowledged understanding

## 2023-08-08 ENCOUNTER — HOSPITAL ENCOUNTER (EMERGENCY)
Facility: HOSPITAL | Age: 68
Discharge: LEFT BEFORE DISCHARGE | End: 2023-08-08
Attending: STUDENT IN AN ORGANIZED HEALTH CARE EDUCATION/TRAINING PROGRAM

## 2023-08-08 ENCOUNTER — APPOINTMENT (OUTPATIENT)
Dept: VASCULAR SURGERY | Facility: HOSPITAL | Age: 68
End: 2023-08-08
Payer: MEDICARE

## 2023-08-08 ENCOUNTER — OFFICE VISIT (OUTPATIENT)
Facility: CLINIC | Age: 68
End: 2023-08-08

## 2023-08-08 ENCOUNTER — TELEPHONE (OUTPATIENT)
Facility: CLINIC | Age: 68
End: 2023-08-08

## 2023-08-08 ENCOUNTER — APPOINTMENT (OUTPATIENT)
Facility: HOSPITAL | Age: 68
End: 2023-08-08

## 2023-08-08 VITALS
SYSTOLIC BLOOD PRESSURE: 127 MMHG | HEIGHT: 70 IN | RESPIRATION RATE: 18 BRPM | TEMPERATURE: 97.6 F | DIASTOLIC BLOOD PRESSURE: 75 MMHG | WEIGHT: 206.79 LBS | BODY MASS INDEX: 29.6 KG/M2 | OXYGEN SATURATION: 99 % | HEART RATE: 63 BPM

## 2023-08-08 VITALS
OXYGEN SATURATION: 97 % | HEART RATE: 69 BPM | RESPIRATION RATE: 20 BRPM | WEIGHT: 204 LBS | BODY MASS INDEX: 29.2 KG/M2 | TEMPERATURE: 97.9 F | DIASTOLIC BLOOD PRESSURE: 70 MMHG | HEIGHT: 70 IN | SYSTOLIC BLOOD PRESSURE: 127 MMHG

## 2023-08-08 DIAGNOSIS — M79.89 LEFT LEG SWELLING: Primary | ICD-10-CM

## 2023-08-08 DIAGNOSIS — I82.A12 ACUTE DEEP VEIN THROMBOSIS (DVT) OF AXILLARY VEIN OF LEFT UPPER EXTREMITY (HCC): Primary | ICD-10-CM

## 2023-08-08 LAB
ALBUMIN SERPL-MCNC: 3.2 G/DL (ref 3.5–5)
ALBUMIN/GLOB SERPL: 0.6 (ref 1.1–2.2)
ALP SERPL-CCNC: 107 U/L (ref 45–117)
ALT SERPL-CCNC: 21 U/L (ref 12–78)
ANION GAP SERPL CALC-SCNC: 4 MMOL/L (ref 5–15)
AST SERPL-CCNC: 17 U/L (ref 15–37)
BASOPHILS # BLD: 0 K/UL (ref 0–0.1)
BASOPHILS NFR BLD: 0 % (ref 0–1)
BILIRUB SERPL-MCNC: 0.7 MG/DL (ref 0.2–1)
BUN SERPL-MCNC: 16 MG/DL (ref 6–20)
BUN/CREAT SERPL: 12 (ref 12–20)
CALCIUM SERPL-MCNC: 9 MG/DL (ref 8.5–10.1)
CHLORIDE SERPL-SCNC: 109 MMOL/L (ref 97–108)
CO2 SERPL-SCNC: 25 MMOL/L (ref 21–32)
COMMENT:: NORMAL
CREAT SERPL-MCNC: 1.3 MG/DL (ref 0.7–1.3)
DIFFERENTIAL METHOD BLD: ABNORMAL
ECHO BSA: 2.15 M2
EOSINOPHIL # BLD: 0 K/UL (ref 0–0.4)
EOSINOPHIL NFR BLD: 0 % (ref 0–7)
ERYTHROCYTE [DISTWIDTH] IN BLOOD BY AUTOMATED COUNT: 14.4 % (ref 11.5–14.5)
GLOBULIN SER CALC-MCNC: 5.1 G/DL (ref 2–4)
GLUCOSE SERPL-MCNC: 116 MG/DL (ref 65–100)
HCT VFR BLD AUTO: 34.5 % (ref 36.6–50.3)
HGB BLD-MCNC: 12 G/DL (ref 12.1–17)
IMM GRANULOCYTES # BLD AUTO: 0 K/UL (ref 0–0.04)
IMM GRANULOCYTES NFR BLD AUTO: 0 % (ref 0–0.5)
INR PPP: 1.1 (ref 0.9–1.1)
LYMPHOCYTES # BLD: 1.1 K/UL (ref 0.8–3.5)
LYMPHOCYTES NFR BLD: 12 % (ref 12–49)
MCH RBC QN AUTO: 33.4 PG (ref 26–34)
MCHC RBC AUTO-ENTMCNC: 34.8 G/DL (ref 30–36.5)
MCV RBC AUTO: 96.1 FL (ref 80–99)
MONOCYTES # BLD: 0.8 K/UL (ref 0–1)
MONOCYTES NFR BLD: 9 % (ref 5–13)
NEUTS SEG # BLD: 7.1 K/UL (ref 1.8–8)
NEUTS SEG NFR BLD: 79 % (ref 32–75)
NRBC # BLD: 0 K/UL (ref 0–0.01)
NRBC BLD-RTO: 0 PER 100 WBC
PLATELET # BLD AUTO: 232 K/UL (ref 150–400)
PMV BLD AUTO: 10.6 FL (ref 8.9–12.9)
POTASSIUM SERPL-SCNC: 3.8 MMOL/L (ref 3.5–5.1)
PROT SERPL-MCNC: 8.3 G/DL (ref 6.4–8.2)
PROTHROMBIN TIME: 11.6 SEC (ref 9–11.1)
RBC # BLD AUTO: 3.59 M/UL (ref 4.1–5.7)
SODIUM SERPL-SCNC: 138 MMOL/L (ref 136–145)
SPECIMEN HOLD: NORMAL
WBC # BLD AUTO: 9 K/UL (ref 4.1–11.1)

## 2023-08-08 PROCEDURE — 71275 CT ANGIOGRAPHY CHEST: CPT

## 2023-08-08 PROCEDURE — 74177 CT ABD & PELVIS W/CONTRAST: CPT

## 2023-08-08 PROCEDURE — 6360000004 HC RX CONTRAST MEDICATION

## 2023-08-08 PROCEDURE — 85025 COMPLETE CBC W/AUTO DIFF WBC: CPT

## 2023-08-08 PROCEDURE — 85610 PROTHROMBIN TIME: CPT

## 2023-08-08 PROCEDURE — 93971 EXTREMITY STUDY: CPT

## 2023-08-08 PROCEDURE — 99215 OFFICE O/P EST HI 40 MIN: CPT | Performed by: FAMILY MEDICINE

## 2023-08-08 PROCEDURE — 80053 COMPREHEN METABOLIC PANEL: CPT

## 2023-08-08 PROCEDURE — 6370000000 HC RX 637 (ALT 250 FOR IP)

## 2023-08-08 PROCEDURE — 99285 EMERGENCY DEPT VISIT HI MDM: CPT

## 2023-08-08 PROCEDURE — 3078F DIAST BP <80 MM HG: CPT | Performed by: FAMILY MEDICINE

## 2023-08-08 PROCEDURE — 2580000003 HC RX 258

## 2023-08-08 PROCEDURE — 3074F SYST BP LT 130 MM HG: CPT | Performed by: FAMILY MEDICINE

## 2023-08-08 PROCEDURE — 1123F ACP DISCUSS/DSCN MKR DOCD: CPT | Performed by: FAMILY MEDICINE

## 2023-08-08 PROCEDURE — 36415 COLL VENOUS BLD VENIPUNCTURE: CPT

## 2023-08-08 RX ORDER — 0.9 % SODIUM CHLORIDE 0.9 %
500 INTRAVENOUS SOLUTION INTRAVENOUS ONCE
Status: COMPLETED | OUTPATIENT
Start: 2023-08-08 | End: 2023-08-08

## 2023-08-08 RX ADMIN — IOPAMIDOL 100 ML: 755 INJECTION, SOLUTION INTRAVENOUS at 11:58

## 2023-08-08 RX ADMIN — APIXABAN 10 MG: 5 TABLET, FILM COATED ORAL at 14:29

## 2023-08-08 RX ADMIN — SODIUM CHLORIDE 500 ML: 9 INJECTION, SOLUTION INTRAVENOUS at 11:27

## 2023-08-08 ASSESSMENT — PATIENT HEALTH QUESTIONNAIRE - PHQ9
SUM OF ALL RESPONSES TO PHQ QUESTIONS 1-9: 0
1. LITTLE INTEREST OR PLEASURE IN DOING THINGS: 0
SUM OF ALL RESPONSES TO PHQ QUESTIONS 1-9: 0
2. FEELING DOWN, DEPRESSED OR HOPELESS: 0
SUM OF ALL RESPONSES TO PHQ QUESTIONS 1-9: 0
SUM OF ALL RESPONSES TO PHQ QUESTIONS 1-9: 0
SUM OF ALL RESPONSES TO PHQ9 QUESTIONS 1 & 2: 0

## 2023-08-08 ASSESSMENT — PAIN - FUNCTIONAL ASSESSMENT: PAIN_FUNCTIONAL_ASSESSMENT: NONE - DENIES PAIN

## 2023-08-08 ASSESSMENT — ENCOUNTER SYMPTOMS: SHORTNESS OF BREATH: 0

## 2023-08-08 NOTE — DISCHARGE INSTRUCTIONS
Take the Eqliuis as instructed. Follow-up with your primary care provider as soon as possible. Go to the nearest emergency department with new shortness of breath, chest pain, or any other concerns.

## 2023-08-08 NOTE — ED TRIAGE NOTES
Patient arrives with c/o left leg swelling and tightness, with intermittent SOB. Denies chest pain, SOB in triage. States had US of left this morning at [facility] outpatient, and reports concern for DVT. Was advised to come to ED for evaluation. Denies use of blood thinning medication.

## 2023-08-08 NOTE — ED PROVIDER NOTES
[facility] EMERGENCY DEPT  EMERGENCY DEPARTMENT ENCOUNTER      Pt Name: Sunshine Zamora  MRN: [id number]  [address] [facility] [place] 1955  Date of evaluation: 8/8/2023  Provider: MOHIT Perla - NP    [address]       Chief Complaint   Patient presents with    Leg Swelling         HISTORY OF PRESENT ILLNESS   (Location/Symptom, Timing/Onset, Context/Setting, Quality, Duration, Modifying Factors, Severity)  Note limiting factors. Sunshine Zamora is a 77 y.o. male presenting to the ED c/o left leg swelling for the past 3 weeks to 1 month. Pt reports being sent by [facility] to r/o DVT due to abnormal ultrasound. Pt reports having intermittent sob but denies chest pain or shortness of breath at this time. Denies use of blood thinners, hx blood clots, recent hospitalization, recent travels. Medical hx: BPH, elevated cholesterol, GERD, renal cell CA, RA, HTN  Surgery: right kidney removal        The history is provided by the patient. No  was used. Review of External Medical Records:     Nursing Notes were reviewed. REVIEW OF SYSTEMS    (2-9 systems for level 4, 10 or more for level 5)     Review of Systems   Constitutional:  Negative for activity change and appetite change. Respiratory:  Negative for shortness of breath. Cardiovascular:  Positive for leg swelling. Negative for chest pain. Left leg swelling   Musculoskeletal:  Negative for myalgias. Skin:  Negative for rash. Neurological:  Negative for weakness, numbness and headaches. All other systems reviewed and are negative. Except as noted above the remainder of the review of systems was reviewed and negative.        PAST MEDICAL HISTORY     Past Medical History:   Diagnosis Date    BPH (benign prostatic hypertrophy) 11/19/2012    Eczema 11/19/2012    Elevated cholesterol 11/19/2012    GERD (gastroesophageal reflux disease) 11/11/2016    History of renal cell cancer 11/19/2012    Clear cell    HTN

## 2023-08-08 NOTE — ASSESSMENT & PLAN NOTE
Concern high for proximal DVT with uncertain level of extension. Needs to have formal imaging that includes pelvis with preference for CTV. Patient would then need anticoagulation. Discussed that differential prevents securing guaranteed outpatient management and payer issues with oral anticoag an issue as well. Recommend ER visit now. Will call ahead.

## 2023-08-08 NOTE — ED NOTES
Pt DC by another RN.  Patient leaving against medical advice to stay in hospital.      Vlad Briseno RN  08/08/23 3959

## 2023-08-08 NOTE — TELEPHONE ENCOUNTER
Pt called to advise Dr. Obi Hamilton he was diagnosed with DVT of left leg and chose not to remain in hospital for overnight observation. Pt notes he was given Eliquis, starting with dose for today and will  prescription, but wants to know what Dr. Obi Hamilton recommends. Pt aware of potential complications, including risk of pulmonary embolism which can be fatal but states he will seek immediate medical care if symptoms occur. Please advise at [phone].  Ldm

## 2023-08-08 NOTE — PROGRESS NOTES
Chief Complaint   Patient presents with    Leg Swelling     Left leg x2 weeks   Unknown reason why - is elevating his leg in recliner.

## 2023-08-08 NOTE — TELEPHONE ENCOUNTER
Called and got patient's voicemail box. Left message explaining plan:    Pickup and start Eliquis starter pack from pharmacy [facility]  I have sent the additional 3 months of therapy after that with refills. He needs to make a follow up appointment with one of us soon, doesn't matter which provider. He should return to the emergency room at the first sign of worsening. He should wrap and elevated leg to help with healing of veins so he doesn't develop chronic post-thrombotic pain syndrome. Please call patient again and make sure that he got the message, as this has potential to be life threatening.

## 2023-08-09 ENCOUNTER — TELEPHONE (OUTPATIENT)
Facility: CLINIC | Age: 68
End: 2023-08-09

## 2023-08-21 ENCOUNTER — OFFICE VISIT (OUTPATIENT)
Age: 68
End: 2023-08-21

## 2023-08-21 VITALS
WEIGHT: 204 LBS | BODY MASS INDEX: 29.27 KG/M2 | TEMPERATURE: 98 F | RESPIRATION RATE: 16 BRPM | SYSTOLIC BLOOD PRESSURE: 143 MMHG | DIASTOLIC BLOOD PRESSURE: 74 MMHG | OXYGEN SATURATION: 96 % | HEART RATE: 69 BPM

## 2023-08-21 DIAGNOSIS — I82.90 THROMBOSIS: ICD-10-CM

## 2023-08-21 DIAGNOSIS — M05.79 RHEUMATOID ARTHRITIS WITH RHEUMATOID FACTOR OF MULTIPLE SITES WITHOUT ORGAN OR SYSTEMS INVOLVEMENT (HCC): Primary | ICD-10-CM

## 2023-08-21 PROCEDURE — 99214 OFFICE O/P EST MOD 30 MIN: CPT | Performed by: PEDIATRICS

## 2023-08-21 PROCEDURE — 3078F DIAST BP <80 MM HG: CPT | Performed by: PEDIATRICS

## 2023-08-21 PROCEDURE — 1123F ACP DISCUSS/DSCN MKR DOCD: CPT | Performed by: PEDIATRICS

## 2023-08-21 PROCEDURE — 3077F SYST BP >= 140 MM HG: CPT | Performed by: PEDIATRICS

## 2023-08-21 ASSESSMENT — PATIENT HEALTH QUESTIONNAIRE - PHQ9
SUM OF ALL RESPONSES TO PHQ QUESTIONS 1-9: 0
1. LITTLE INTEREST OR PLEASURE IN DOING THINGS: 0
SUM OF ALL RESPONSES TO PHQ QUESTIONS 1-9: 0
SUM OF ALL RESPONSES TO PHQ QUESTIONS 1-9: 0
SUM OF ALL RESPONSES TO PHQ9 QUESTIONS 1 & 2: 0
2. FEELING DOWN, DEPRESSED OR HOPELESS: 0
SUM OF ALL RESPONSES TO PHQ QUESTIONS 1-9: 0

## 2023-08-21 NOTE — PROGRESS NOTES
Chief Complaint   Patient presents with    Joint Pain     1. Have you been to the ER, urgent care clinic since your last visit? Hospitalized since your last visit? No    2. Have you seen or consulted any other health care providers outside of the [address] [place] [address] since your last visit? Include any pap smears or colon screening.  No
AND PROCEDURES  Previous labs reviewed -Yes  Previous radiology reviewed -Yes    Right Elbow 5/07/2018: no fracture, dislocation, effusion or other acute abnormality. Moderate joint space narrowing, mild spurring of the olecranon process. No bony erosion, no fracture. The bone is normal in mineral content. No joint effusion    Chest 9/22/2016: The cardiopericardial silhouette is within normal limits. There is no pleural effusion, pneumothorax or focal consolidation present. Bilateral Hand 3/15/2013: LEFT: Density appears normal. No articular erosions are seen. No abnormal soft tissue swelling is seen. RIGHT: Bone density is normal. No joint erosions are seen. There are minor osteoarthritic changes at the first carpometacarpal junction. No abnormal soft tissue swelling is seen    Previous procedures reviewed -Yes  Previous medical records reviewed/summarized -Yes    ASSESSMENT  1. Seropositive rheumatoid arthritis - This patient continues to do well on his current regimen. He continue methotrexate 15 mg PO weekly. We will not increase methotrexate in the future if he has worsening joint symptoms or eye symptoms since he only has one kidney. We will check APS labs since he recently had an unprovoked DVT. If APS labs are positive we will repeat them in November. 2. Iritis - The patient has not had any recent flares. He will decrease methotrexate to 15 mg PO weekly. 3. Drug therapy monitoring for toxicity (methotrexate) - CBC, BUN, Cr, AST, ALT and albumin every 3 months    PLAN  1. Methotrexate 15 mg PO weekly    2. Check for anti-phospholipid syndrome - anticardiolipin IgG, IgM, IgA lupus anti-coagulant panel and beta-2 glycoprotein IgG, IgA. 3. Follow up in 6 months     Park Bello MD  Adult and Pediatric Rheumatology     [facility], [address], Phone [phone], Fax [phone]    Visiting  of Pediatrics    Department of

## 2023-09-01 NOTE — TELEPHONE ENCOUNTER
Last visit 08/21/23  Lab Results   Component Value Date     08/08/2023    K 3.8 08/08/2023     (H) 08/08/2023    CO2 25 08/08/2023    BUN 16 08/08/2023    CREATININE 1.30 08/08/2023    GLUCOSE 116 (H) 08/08/2023    CALCIUM 9.0 08/08/2023    PROT 8.3 (H) 08/08/2023    LABALBU 3.2 (L) 08/08/2023    BILITOT 0.7 08/08/2023    ALKPHOS 107 08/08/2023    AST 17 08/08/2023    ALT 21 08/08/2023    LABGLOM 59 (L) 08/08/2023    GFRAA 67 08/27/2021    AGRATIO 1.0 (L) 07/27/2023    GLOB 5.1 (H) 08/08/2023     Lab Results   Component Value Date    WBC 9.0 08/08/2023    HGB 12.0 (L) 08/08/2023    HCT 34.5 (L) 08/08/2023    MCV 96.1 08/08/2023     08/08/2023

## 2023-09-07 ENCOUNTER — TELEMEDICINE (OUTPATIENT)
Facility: CLINIC | Age: 68
End: 2023-09-07

## 2023-09-07 DIAGNOSIS — I26.99 PULMONARY EMBOLISM WITHOUT ACUTE COR PULMONALE, UNSPECIFIED CHRONICITY, UNSPECIFIED PULMONARY EMBOLISM TYPE (HCC): ICD-10-CM

## 2023-09-07 DIAGNOSIS — Z79.01 ANTICOAGULATED: ICD-10-CM

## 2023-09-07 DIAGNOSIS — I82.4Y2 ACUTE DEEP VEIN THROMBOSIS (DVT) OF PROXIMAL VEIN OF LEFT LOWER EXTREMITY (HCC): Primary | ICD-10-CM

## 2023-09-07 PROCEDURE — 1123F ACP DISCUSS/DSCN MKR DOCD: CPT | Performed by: FAMILY MEDICINE

## 2023-09-07 PROCEDURE — 99213 OFFICE O/P EST LOW 20 MIN: CPT | Performed by: FAMILY MEDICINE

## 2023-09-07 SDOH — ECONOMIC STABILITY: FOOD INSECURITY: WITHIN THE PAST 12 MONTHS, YOU WORRIED THAT YOUR FOOD WOULD RUN OUT BEFORE YOU GOT MONEY TO BUY MORE.: NEVER TRUE

## 2023-09-07 SDOH — ECONOMIC STABILITY: FOOD INSECURITY: WITHIN THE PAST 12 MONTHS, THE FOOD YOU BOUGHT JUST DIDN'T LAST AND YOU DIDN'T HAVE MONEY TO GET MORE.: NEVER TRUE

## 2023-09-07 SDOH — ECONOMIC STABILITY: INCOME INSECURITY: HOW HARD IS IT FOR YOU TO PAY FOR THE VERY BASICS LIKE FOOD, HOUSING, MEDICAL CARE, AND HEATING?: NOT HARD AT ALL

## 2023-09-07 SDOH — ECONOMIC STABILITY: HOUSING INSECURITY
IN THE LAST 12 MONTHS, WAS THERE A TIME WHEN YOU DID NOT HAVE A STEADY PLACE TO SLEEP OR SLEPT IN A SHELTER (INCLUDING NOW)?: NO

## 2023-09-07 ASSESSMENT — PATIENT HEALTH QUESTIONNAIRE - PHQ9
1. LITTLE INTEREST OR PLEASURE IN DOING THINGS: 0
2. FEELING DOWN, DEPRESSED OR HOPELESS: 0
SUM OF ALL RESPONSES TO PHQ QUESTIONS 1-9: 0
SUM OF ALL RESPONSES TO PHQ9 QUESTIONS 1 & 2: 0

## 2023-09-07 ASSESSMENT — ENCOUNTER SYMPTOMS: SHORTNESS OF BREATH: 0

## 2023-09-07 NOTE — PROGRESS NOTES
Liseth Teran, was evaluated through a synchronous (real-time) audio-video encounter. The patient (or guardian if applicable) is aware that this is a billable service, which includes applicable co-pays. This Virtual Visit was conducted with patient's (and/or legal guardian's) consent. Patient identification was verified, and a caregiver was present when appropriate. The patient was located at Home: [address] [place] [address] [address] [id number]  Provider was located at Home ([address] [facility]): [address] [facility] (:  1955) is a Established patient, presenting virtually for evaluation of the following:    Assessment & Plan   Below is the assessment and plan developed based on review of pertinent history, physical exam, labs, studies, and medications. 1. Acute deep vein thrombosis (DVT) of proximal vein of left lower extremity (HCC)  -     apixaban (ELIQUIS) 5 MG TABS tablet; Take 1 tablet by mouth 2 times daily, Disp-180 tablet, [id number] Garner MD, Hematology/Oncology, [facility]  2. Pulmonary embolism without acute cor pulmonale, unspecified chronicity, unspecified pulmonary embolism type (HCC)  -     apixaban (ELIQUIS) 5 MG TABS tablet; Take 1 tablet by mouth 2 times daily, Disp-180 tablet, [id number] Garner MD, Hematology/Oncology, [facility]  3. Anticoagulated  -     Tricia Silverman MD, Hematology/Oncology, [facility]    Uncertain if this is his second clot or not as it is not 100% clear if the PE is chronic or not  Continue Eliquis  Refills per orders  Hematology referral regarding length of anticoagulation    Return in about 6 months (around 3/7/2024) for HTN. Subjective   Patient presents with: Follow-Up from Hospital  Medication Refill    He was seen in the ER  for an Acute deep vein thrombosis (DVT) of the left LE. He needs a refill on his Eliquis. His leg is almost back to normal without pain.   A single, peripheral, segmental, RLL

## 2023-09-07 NOTE — PROGRESS NOTES
Chief Complaint   Patient presents with    Follow-Up from Hospital    Medication Refill     1. Have you been to the ER, urgent care clinic since your last visit? Hospitalized since your last visit? Yes When: 08/08/23 Where: [facility] Reason for visit: DVT left leg. 2. Have you seen or consulted any other health care providers outside of the [address] [place] [address] Avenue since your last visit? Include any pap smears or colon screening.  No

## 2023-09-08 ENCOUNTER — TELEPHONE (OUTPATIENT)
Age: 68
End: 2023-09-08

## 2023-09-08 NOTE — TELEPHONE ENCOUNTER
Called patient to set up NP appt. No answer.  Left vm     Np / Acute deep vein thrombosis (DVT) of proximal vein of left lower extremity ([address]) / Aureliano Paul

## 2024-02-21 ENCOUNTER — OFFICE VISIT (OUTPATIENT)
Age: 69
End: 2024-02-21
Payer: MEDICARE

## 2024-02-21 VITALS
SYSTOLIC BLOOD PRESSURE: 131 MMHG | HEART RATE: 70 BPM | WEIGHT: 204.8 LBS | RESPIRATION RATE: 16 BRPM | DIASTOLIC BLOOD PRESSURE: 73 MMHG | OXYGEN SATURATION: 96 % | TEMPERATURE: 97.1 F | BODY MASS INDEX: 29.39 KG/M2

## 2024-02-21 DIAGNOSIS — M05.79 RHEUMATOID ARTHRITIS WITH RHEUMATOID FACTOR OF MULTIPLE SITES WITHOUT ORGAN OR SYSTEMS INVOLVEMENT (HCC): ICD-10-CM

## 2024-02-21 DIAGNOSIS — I82.90 THROMBOSIS: Primary | ICD-10-CM

## 2024-02-21 DIAGNOSIS — N18.31 STAGE 3A CHRONIC KIDNEY DISEASE (HCC): ICD-10-CM

## 2024-02-21 DIAGNOSIS — Z79.60 LONG TERM (CURRENT) USE OF UNSPECIFIED IMMUNOMODULATORS AND IMMUNOSUPPRESSANTS: ICD-10-CM

## 2024-02-21 PROBLEM — N18.30 CHRONIC RENAL DISEASE, STAGE III (HCC): Status: ACTIVE | Noted: 2024-02-21

## 2024-02-21 PROCEDURE — 99214 OFFICE O/P EST MOD 30 MIN: CPT | Performed by: PEDIATRICS

## 2024-02-21 PROCEDURE — 1036F TOBACCO NON-USER: CPT | Performed by: PEDIATRICS

## 2024-02-21 PROCEDURE — G8484 FLU IMMUNIZE NO ADMIN: HCPCS | Performed by: PEDIATRICS

## 2024-02-21 PROCEDURE — G8427 DOCREV CUR MEDS BY ELIG CLIN: HCPCS | Performed by: PEDIATRICS

## 2024-02-21 PROCEDURE — 3078F DIAST BP <80 MM HG: CPT | Performed by: PEDIATRICS

## 2024-02-21 PROCEDURE — 1123F ACP DISCUSS/DSCN MKR DOCD: CPT | Performed by: PEDIATRICS

## 2024-02-21 PROCEDURE — 3017F COLORECTAL CA SCREEN DOC REV: CPT | Performed by: PEDIATRICS

## 2024-02-21 PROCEDURE — G8419 CALC BMI OUT NRM PARAM NOF/U: HCPCS | Performed by: PEDIATRICS

## 2024-02-21 PROCEDURE — 3075F SYST BP GE 130 - 139MM HG: CPT | Performed by: PEDIATRICS

## 2024-02-21 RX ORDER — METHOTREXATE 2.5 MG/1
15 TABLET ORAL WEEKLY
Qty: 72 TABLET | Refills: 1 | Status: SHIPPED | OUTPATIENT
Start: 2024-02-21 | End: 2024-05-21

## 2024-02-21 NOTE — PROGRESS NOTES
RHEUMATOLOGY PROBLEM LIST AND CHIEF COMPLAINT  1. Seropositive rheumatoid arthritis - CCP (>250), RF (179.6)  2. Iritis     Immunosuppression Screening (6/04/2019):  Quantiferon TB: negative  PPD:  Not performed  Hepatitis B: negative  Hepatitis C: negative     Therapy History includes:  Current DMARD therapy include: methotrexate (11/24/2020 to present)  Prior DMARD therapy include: methotrexate 15 mg every Tuesday  Discontinued DMARDs because of inefficacy: None  Discontinued DMARDs because of side effects: none    INTERVAL HISTORY  This is a 68 y.o.  male.  Today, the patient complains of no pain in the joints.  Location: none  Severity: 0 on a scale of 0-10  Timing: none   Context/Associated signs and symptoms: The patient has been doing well. He continues on methotrexate 15 mg PO weekly. He denies any joint pain, joint swelling, morning stiffness. Of note, he took Eliquis for 1 month for hx of DVT, but stopped when he finished the bottle.     RHEUMATOLOGY REVIEW OF SYSTEMS   Positives as per history  Negatives as follows:  CONSTITUTlONAL:  Denies unexplained persistent fevers, weight change, chronic fatigue  HEAD/EYES:   Denies eye redness, blurry vision or sudden loss of vision, dry eyes, HA, temporal artery pain  ENT:    Denies oral/nasal ulcers, recurrent sinus infections, dry mouth  RESPIRATORY:  No pleuritic pain, history of pleural effusions, hemoptysis, exertional dyspnea  CARDIOVASCULAR: Denies chest pain, history of pericardial effusions  GASTRO:   Denies heartburn, esophageal dysmotility, abdominal pain, nausea, vomiting, diarrhea, blood in the stool  HEMATOLOGIC:  No easy bruising, purpura, swollen lymph nodes  SKIN:    Denies alopecia, ulcers, nodules, sun sensitivity, unexplained persistent rash   VASCULAR:   Denies edema, cyanosis, raynaud phenomenon  NEUROLOGIC:  Denies specific muscle weakness, paresthesias   PSYCHIATRIC:  No sleep disturbance / snoring, depression, anxiety  MSK:

## 2024-05-13 RX ORDER — AMLODIPINE BESYLATE 10 MG/1
10 TABLET ORAL DAILY
Qty: 90 TABLET | Refills: 0 | Status: SHIPPED | OUTPATIENT
Start: 2024-05-13

## 2024-05-13 RX ORDER — LOSARTAN POTASSIUM 100 MG/1
100 TABLET ORAL DAILY
Qty: 90 TABLET | Refills: 0 | Status: SHIPPED | OUTPATIENT
Start: 2024-05-13

## 2024-05-13 NOTE — TELEPHONE ENCOUNTER
Faxed:     Losartan Potassium 100mg tab  Qty:90  Refills:3    Amlodipine Besylate 10mg tab  Qty: 90  Refills:3    [id number]

## 2024-05-28 ENCOUNTER — TELEPHONE (OUTPATIENT)
Age: 69
End: 2024-05-28

## 2024-05-28 NOTE — TELEPHONE ENCOUNTER
Called patient to schedule a new patient appointment. Patient stated that he does not need to schedule with us anymore.

## 2024-06-06 ENCOUNTER — OFFICE VISIT (OUTPATIENT)
Facility: CLINIC | Age: 69
End: 2024-06-06
Payer: MEDICARE

## 2024-06-06 VITALS
BODY MASS INDEX: 28.06 KG/M2 | HEIGHT: 70 IN | RESPIRATION RATE: 20 BRPM | SYSTOLIC BLOOD PRESSURE: 124 MMHG | HEART RATE: 64 BPM | DIASTOLIC BLOOD PRESSURE: 67 MMHG | WEIGHT: 196 LBS | OXYGEN SATURATION: 96 % | TEMPERATURE: 97.7 F

## 2024-06-06 DIAGNOSIS — Z86.718 HISTORY OF DVT OF LOWER EXTREMITY: ICD-10-CM

## 2024-06-06 DIAGNOSIS — I49.9 IRREGULAR HEARTBEAT: ICD-10-CM

## 2024-06-06 DIAGNOSIS — Z85.528 HISTORY OF RENAL CELL CANCER: ICD-10-CM

## 2024-06-06 DIAGNOSIS — R01.1 HEART MURMUR: ICD-10-CM

## 2024-06-06 DIAGNOSIS — R06.02 SHORTNESS OF BREATH: ICD-10-CM

## 2024-06-06 DIAGNOSIS — N18.31 STAGE 3A CHRONIC KIDNEY DISEASE (HCC): ICD-10-CM

## 2024-06-06 DIAGNOSIS — R06.09 EXERTIONAL DYSPNEA: ICD-10-CM

## 2024-06-06 DIAGNOSIS — I10 ESSENTIAL HYPERTENSION: Primary | ICD-10-CM

## 2024-06-06 DIAGNOSIS — I25.119 CORONARY ARTERY DISEASE INVOLVING NATIVE CORONARY ARTERY OF NATIVE HEART WITH ANGINA PECTORIS (HCC): ICD-10-CM

## 2024-06-06 PROBLEM — I82.412 DVT OF DEEP FEMORAL VEIN, LEFT (HCC): Status: ACTIVE | Noted: 2023-08-08

## 2024-06-06 PROBLEM — N18.2 CKD (CHRONIC KIDNEY DISEASE) STAGE 2, GFR 60-89 ML/MIN: Status: RESOLVED | Noted: 2018-05-07 | Resolved: 2024-06-06

## 2024-06-06 PROCEDURE — G8419 CALC BMI OUT NRM PARAM NOF/U: HCPCS | Performed by: FAMILY MEDICINE

## 2024-06-06 PROCEDURE — 1123F ACP DISCUSS/DSCN MKR DOCD: CPT | Performed by: FAMILY MEDICINE

## 2024-06-06 PROCEDURE — 3078F DIAST BP <80 MM HG: CPT | Performed by: FAMILY MEDICINE

## 2024-06-06 PROCEDURE — 3017F COLORECTAL CA SCREEN DOC REV: CPT | Performed by: FAMILY MEDICINE

## 2024-06-06 PROCEDURE — 99215 OFFICE O/P EST HI 40 MIN: CPT | Performed by: FAMILY MEDICINE

## 2024-06-06 PROCEDURE — 3074F SYST BP LT 130 MM HG: CPT | Performed by: FAMILY MEDICINE

## 2024-06-06 PROCEDURE — G8427 DOCREV CUR MEDS BY ELIG CLIN: HCPCS | Performed by: FAMILY MEDICINE

## 2024-06-06 PROCEDURE — 1036F TOBACCO NON-USER: CPT | Performed by: FAMILY MEDICINE

## 2024-06-06 RX ORDER — ASPIRIN 81 MG/1
81 TABLET ORAL DAILY
Qty: 90 TABLET | Refills: 0 | COMMUNITY
Start: 2024-06-06

## 2024-06-06 ASSESSMENT — PATIENT HEALTH QUESTIONNAIRE - PHQ9
SUM OF ALL RESPONSES TO PHQ QUESTIONS 1-9: 0
1. LITTLE INTEREST OR PLEASURE IN DOING THINGS: NOT AT ALL
SUM OF ALL RESPONSES TO PHQ9 QUESTIONS 1 & 2: 0
SUM OF ALL RESPONSES TO PHQ QUESTIONS 1-9: 0
2. FEELING DOWN, DEPRESSED OR HOPELESS: NOT AT ALL

## 2024-06-06 NOTE — PROGRESS NOTES
SUBJECTIVES  with respective ASSESSMENT/PLAN:  Mr. Joe Kaur is a 68 y.o. male established patient who presents for Follow-up (6 mo fu would like heart listened to since having blood clot has notice some sob with exertion. Gen health is pretty good /)  , found to have the followin. Essential hypertension  Overview:  BP Readings from Last 3 Encounters:   24 124/67   24 131/73   23 (!) 143/74     Medication: amlodipine 10mg daily, losartan 100mg daily  Assessment & Plan:   Well-controlled, continue current medications and continue current treatment plan pending workup.  Orders:  -     EKG 12 Lead; Future  2. Exertional dyspnea  Overview:  6 months of nonprogressive intermittent exertional dyspnea improved with rest.  No associated chest pains or lightheadedness.  Comes in the wake of his recent acute DVT of the left lower extremity including the femoral and external iliac vein.  Some signs of small left lower lobe PE possibly chronic versus acute on his CTA at that time.  Completed 3 months of anticoagulation medication Eliquis and then discontinued.  No follow-up imaging performed.    Notable exam findings include irregular heartbeat and aortic ejection murmur that radiates to other areas of the heart.  Patient otherwise feels asymptomatic in the setting of these things.    Ultimate goal is to feel safe playing tennis again so he can get active.    Distant history of cocaine use in his youth.  Comorbidities include hypertension, history of renal cell cancer, immunosuppression secondary to rheumatoid arthritis.  Coronary artery calcifications.  Assessment & Plan:     Orders:  -     EKG 12 Lead; Future  -     Exercise stress test; Future  -     Echo (TTE) complete (PRN contrast/bubble/strain/3D); Future  -     XR CHEST (2 VIEWS); Future  3. Irregular heartbeat  Overview:  Found incidental on exam, two to three normal beats followed by a pause, occasional extra beat.    No results found

## 2024-06-06 NOTE — ASSESSMENT & PLAN NOTE
Well-controlled, continue current medications and continue current treatment plan pending workup.

## 2024-06-06 NOTE — PROGRESS NOTES
Chief Complaint   Patient presents with    Follow-up     6 mo fu would like heart listened to since having blood clot has notice some sob with exertion. Gen health is pretty good

## 2024-06-11 ENCOUNTER — TELEPHONE (OUTPATIENT)
Facility: CLINIC | Age: 69
End: 2024-06-11

## 2024-06-11 NOTE — TELEPHONE ENCOUNTER
Pt called requesting call back to advise if Dr. Wisdom wants him to get all of the cardiac tests ordered at his visit yesterday done on the same day how to proceed because he called to schedule and states  wasn't sure.    Please advise at [phone].  Ldm

## 2024-07-12 ENCOUNTER — HOSPITAL ENCOUNTER (OUTPATIENT)
Facility: HOSPITAL | Age: 69
Setting detail: OBSERVATION
Discharge: HOME | End: 2024-07-13
Attending: EMERGENCY MEDICINE | Admitting: FAMILY MEDICINE
Payer: MEDICARE

## 2024-07-12 ENCOUNTER — APPOINTMENT (OUTPATIENT)
Dept: VASCULAR SURGERY | Facility: HOSPITAL | Age: 69
End: 2024-07-12
Attending: EMERGENCY MEDICINE
Payer: MEDICARE

## 2024-07-12 ENCOUNTER — OFFICE VISIT (OUTPATIENT)
Facility: CLINIC | Age: 69
End: 2024-07-12
Payer: MEDICARE

## 2024-07-12 ENCOUNTER — APPOINTMENT (OUTPATIENT)
Facility: HOSPITAL | Age: 69
End: 2024-07-12
Payer: MEDICARE

## 2024-07-12 VITALS
BODY MASS INDEX: 27.2 KG/M2 | WEIGHT: 190 LBS | TEMPERATURE: 98.3 F | RESPIRATION RATE: 20 BRPM | HEART RATE: 85 BPM | SYSTOLIC BLOOD PRESSURE: 124 MMHG | DIASTOLIC BLOOD PRESSURE: 68 MMHG | HEIGHT: 70 IN | OXYGEN SATURATION: 98 %

## 2024-07-12 DIAGNOSIS — D61.818 PANCYTOPENIA (HCC): ICD-10-CM

## 2024-07-12 DIAGNOSIS — L97.929 VENOUS STASIS ULCER OF LEFT LOWER LEG WITH EDEMA OF LEFT LOWER LEG (HCC): ICD-10-CM

## 2024-07-12 DIAGNOSIS — L97.329 VENOUS STASIS ULCER OF LEFT ANKLE WITHOUT VARICOSE VEINS, UNSPECIFIED ULCER STAGE (HCC): ICD-10-CM

## 2024-07-12 DIAGNOSIS — I26.99 PULMONARY EMBOLISM WITHOUT ACUTE COR PULMONALE, UNSPECIFIED CHRONICITY, UNSPECIFIED PULMONARY EMBOLISM TYPE (HCC): ICD-10-CM

## 2024-07-12 DIAGNOSIS — R79.89 ELEVATED SERUM CREATININE: ICD-10-CM

## 2024-07-12 DIAGNOSIS — I83.892 VENOUS STASIS ULCER OF LEFT LOWER LEG WITH EDEMA OF LEFT LOWER LEG (HCC): ICD-10-CM

## 2024-07-12 DIAGNOSIS — R06.09 DOE (DYSPNEA ON EXERTION): ICD-10-CM

## 2024-07-12 DIAGNOSIS — I87.2 VENOUS STASIS ULCER OF LEFT ANKLE WITHOUT VARICOSE VEINS, UNSPECIFIED ULCER STAGE (HCC): ICD-10-CM

## 2024-07-12 DIAGNOSIS — I83.029 VENOUS STASIS ULCER OF LEFT LOWER LEG WITH EDEMA OF LEFT LOWER LEG (HCC): ICD-10-CM

## 2024-07-12 DIAGNOSIS — R06.09 EXERTIONAL DYSPNEA: Primary | ICD-10-CM

## 2024-07-12 DIAGNOSIS — R79.89 ELEVATED BRAIN NATRIURETIC PEPTIDE (BNP) LEVEL: ICD-10-CM

## 2024-07-12 DIAGNOSIS — I82.90 VTE (VENOUS THROMBOEMBOLISM): ICD-10-CM

## 2024-07-12 DIAGNOSIS — R60.0 VENOUS STASIS ULCER OF LEFT LOWER LEG WITH EDEMA OF LEFT LOWER LEG (HCC): ICD-10-CM

## 2024-07-12 DIAGNOSIS — R06.02 SHORTNESS OF BREATH: ICD-10-CM

## 2024-07-12 DIAGNOSIS — I26.09 ACUTE PULMONARY EMBOLISM WITH ACUTE COR PULMONALE, UNSPECIFIED PULMONARY EMBOLISM TYPE (HCC): Primary | ICD-10-CM

## 2024-07-12 LAB
ALBUMIN SERPL-MCNC: 3 G/DL (ref 3.5–5)
ALBUMIN/GLOB SERPL: 0.6 (ref 1.1–2.2)
ALP SERPL-CCNC: 86 U/L (ref 45–117)
ALT SERPL-CCNC: 31 U/L (ref 12–78)
ANION GAP SERPL CALC-SCNC: 10 MMOL/L (ref 5–15)
APTT PPP: 24.5 SEC (ref 22.1–31)
AST SERPL-CCNC: ABNORMAL U/L (ref 15–37)
BASOPHILS # BLD: 0.1 K/UL (ref 0–0.1)
BASOPHILS NFR BLD: 2 % (ref 0–1)
BILIRUB SERPL-MCNC: 1.6 MG/DL (ref 0.2–1)
BUN SERPL-MCNC: 12 MG/DL (ref 6–20)
BUN/CREAT SERPL: 7 (ref 12–20)
CALCIUM SERPL-MCNC: 8.5 MG/DL (ref 8.5–10.1)
CHLORIDE SERPL-SCNC: 105 MMOL/L (ref 97–108)
CO2 SERPL-SCNC: 21 MMOL/L (ref 21–32)
CREAT SERPL-MCNC: 1.61 MG/DL (ref 0.7–1.3)
DIFFERENTIAL METHOD BLD: ABNORMAL
EKG ATRIAL RATE: 76 BPM
EKG DIAGNOSIS: NORMAL
EKG P AXIS: 29 DEGREES
EKG P-R INTERVAL: 246 MS
EKG Q-T INTERVAL: 392 MS
EKG QRS DURATION: 72 MS
EKG QTC CALCULATION (BAZETT): 441 MS
EKG R AXIS: 98 DEGREES
EKG T AXIS: -13 DEGREES
EKG VENTRICULAR RATE: 76 BPM
EOSINOPHIL # BLD: 0.1 K/UL (ref 0–0.4)
EOSINOPHIL NFR BLD: 2 % (ref 0–7)
ERYTHROCYTE [DISTWIDTH] IN BLOOD BY AUTOMATED COUNT: 15.7 % (ref 11.5–14.5)
GLOBULIN SER CALC-MCNC: 4.8 G/DL (ref 2–4)
GLUCOSE SERPL-MCNC: 126 MG/DL (ref 65–100)
HCT VFR BLD AUTO: 24.2 % (ref 36.6–50.3)
HGB BLD-MCNC: 8.6 G/DL (ref 12.1–17)
IMM GRANULOCYTES # BLD AUTO: 0 K/UL
IMM GRANULOCYTES NFR BLD AUTO: 0 %
INR PPP: 1.4 (ref 0.9–1.1)
LYMPHOCYTES # BLD: 0.4 K/UL (ref 0.8–3.5)
LYMPHOCYTES NFR BLD: 14 % (ref 12–49)
MCH RBC QN AUTO: 37.9 PG (ref 26–34)
MCHC RBC AUTO-ENTMCNC: 35.5 G/DL (ref 30–36.5)
MCV RBC AUTO: 106.6 FL (ref 80–99)
MONOCYTES # BLD: 0.1 K/UL (ref 0–1)
MONOCYTES NFR BLD: 2 % (ref 5–13)
NEUTS SEG # BLD: 1.8 K/UL (ref 1.8–8)
NEUTS SEG NFR BLD: 80 % (ref 32–75)
NRBC # BLD: 0 K/UL (ref 0–0.01)
NRBC BLD-RTO: 0 PER 100 WBC
NT PRO BNP: 3892 PG/ML
PLATELET # BLD AUTO: 112 K/UL (ref 150–400)
PMV BLD AUTO: 12.7 FL (ref 8.9–12.9)
POTASSIUM SERPL-SCNC: ABNORMAL MMOL/L (ref 3.5–5.1)
PROT SERPL-MCNC: 7.8 G/DL (ref 6.4–8.2)
PROTHROMBIN TIME: 13.9 SEC (ref 9–11.1)
RBC # BLD AUTO: 2.27 M/UL (ref 4.1–5.7)
RBC MORPH BLD: ABNORMAL
RBC MORPH BLD: ABNORMAL
SODIUM SERPL-SCNC: 136 MMOL/L (ref 136–145)
THERAPEUTIC RANGE: NORMAL SECS (ref 58–77)
TROPONIN I SERPL HS-MCNC: 23 NG/L (ref 0–76)
UFH PPP CHRO-ACNC: <0.1 IU/ML
WBC # BLD AUTO: 2.5 K/UL (ref 4.1–11.1)

## 2024-07-12 PROCEDURE — G8419 CALC BMI OUT NRM PARAM NOF/U: HCPCS | Performed by: FAMILY MEDICINE

## 2024-07-12 PROCEDURE — 99285 EMERGENCY DEPT VISIT HI MDM: CPT

## 2024-07-12 PROCEDURE — 93971 EXTREMITY STUDY: CPT

## 2024-07-12 PROCEDURE — G0378 HOSPITAL OBSERVATION PER HR: HCPCS

## 2024-07-12 PROCEDURE — 3078F DIAST BP <80 MM HG: CPT | Performed by: FAMILY MEDICINE

## 2024-07-12 PROCEDURE — 1123F ACP DISCUSS/DSCN MKR DOCD: CPT | Performed by: FAMILY MEDICINE

## 2024-07-12 PROCEDURE — 96366 THER/PROPH/DIAG IV INF ADDON: CPT

## 2024-07-12 PROCEDURE — 85025 COMPLETE CBC W/AUTO DIFF WBC: CPT

## 2024-07-12 PROCEDURE — 6360000004 HC RX CONTRAST MEDICATION: Performed by: RADIOLOGY

## 2024-07-12 PROCEDURE — 3017F COLORECTAL CA SCREEN DOC REV: CPT | Performed by: FAMILY MEDICINE

## 2024-07-12 PROCEDURE — 6360000002 HC RX W HCPCS: Performed by: EMERGENCY MEDICINE

## 2024-07-12 PROCEDURE — 85610 PROTHROMBIN TIME: CPT

## 2024-07-12 PROCEDURE — 93010 ELECTROCARDIOGRAM REPORT: CPT | Performed by: SPECIALIST

## 2024-07-12 PROCEDURE — 85730 THROMBOPLASTIN TIME PARTIAL: CPT

## 2024-07-12 PROCEDURE — 36415 COLL VENOUS BLD VENIPUNCTURE: CPT

## 2024-07-12 PROCEDURE — 71275 CT ANGIOGRAPHY CHEST: CPT

## 2024-07-12 PROCEDURE — 94761 N-INVAS EAR/PLS OXIMETRY MLT: CPT

## 2024-07-12 PROCEDURE — G8427 DOCREV CUR MEDS BY ELIG CLIN: HCPCS | Performed by: FAMILY MEDICINE

## 2024-07-12 PROCEDURE — 93005 ELECTROCARDIOGRAM TRACING: CPT | Performed by: EMERGENCY MEDICINE

## 2024-07-12 PROCEDURE — 83880 ASSAY OF NATRIURETIC PEPTIDE: CPT

## 2024-07-12 PROCEDURE — 2700000000 HC OXYGEN THERAPY PER DAY

## 2024-07-12 PROCEDURE — 1036F TOBACCO NON-USER: CPT | Performed by: FAMILY MEDICINE

## 2024-07-12 PROCEDURE — 3074F SYST BP LT 130 MM HG: CPT | Performed by: FAMILY MEDICINE

## 2024-07-12 PROCEDURE — 85520 HEPARIN ASSAY: CPT

## 2024-07-12 PROCEDURE — 99215 OFFICE O/P EST HI 40 MIN: CPT | Performed by: FAMILY MEDICINE

## 2024-07-12 PROCEDURE — 96365 THER/PROPH/DIAG IV INF INIT: CPT

## 2024-07-12 PROCEDURE — 80053 COMPREHEN METABOLIC PANEL: CPT

## 2024-07-12 PROCEDURE — 84484 ASSAY OF TROPONIN QUANT: CPT

## 2024-07-12 PROCEDURE — 6370000000 HC RX 637 (ALT 250 FOR IP)

## 2024-07-12 RX ORDER — POLYETHYLENE GLYCOL 3350 17 G/17G
17 POWDER, FOR SOLUTION ORAL DAILY PRN
Status: DISCONTINUED | OUTPATIENT
Start: 2024-07-12 | End: 2024-07-13 | Stop reason: HOSPADM

## 2024-07-12 RX ORDER — AMLODIPINE BESYLATE 5 MG/1
10 TABLET ORAL DAILY
Status: DISCONTINUED | OUTPATIENT
Start: 2024-07-12 | End: 2024-07-13 | Stop reason: HOSPADM

## 2024-07-12 RX ORDER — HEPARIN SODIUM 1000 [USP'U]/ML
40 INJECTION, SOLUTION INTRAVENOUS; SUBCUTANEOUS PRN
Status: DISCONTINUED | OUTPATIENT
Start: 2024-07-12 | End: 2024-07-12

## 2024-07-12 RX ORDER — LOSARTAN POTASSIUM 50 MG/1
100 TABLET ORAL DAILY
Status: DISCONTINUED | OUTPATIENT
Start: 2024-07-12 | End: 2024-07-13 | Stop reason: HOSPADM

## 2024-07-12 RX ORDER — METHOTREXATE 2.5 MG/1
20 TABLET ORAL WEEKLY
Status: DISCONTINUED | OUTPATIENT
Start: 2024-07-16 | End: 2024-07-13 | Stop reason: HOSPADM

## 2024-07-12 RX ORDER — SODIUM CHLORIDE 0.9 % (FLUSH) 0.9 %
5-40 SYRINGE (ML) INJECTION EVERY 12 HOURS SCHEDULED
Status: DISCONTINUED | OUTPATIENT
Start: 2024-07-12 | End: 2024-07-13 | Stop reason: HOSPADM

## 2024-07-12 RX ORDER — HEPARIN SODIUM 1000 [USP'U]/ML
80 INJECTION, SOLUTION INTRAVENOUS; SUBCUTANEOUS PRN
Status: DISCONTINUED | OUTPATIENT
Start: 2024-07-12 | End: 2024-07-12

## 2024-07-12 RX ORDER — ACETAMINOPHEN 500 MG
1000 TABLET ORAL EVERY 6 HOURS SCHEDULED
Status: DISCONTINUED | OUTPATIENT
Start: 2024-07-12 | End: 2024-07-13 | Stop reason: HOSPADM

## 2024-07-12 RX ORDER — ACETAMINOPHEN 325 MG/1
650 TABLET ORAL EVERY 6 HOURS PRN
Status: DISCONTINUED | OUTPATIENT
Start: 2024-07-12 | End: 2024-07-12

## 2024-07-12 RX ORDER — AMOXICILLIN AND CLAVULANATE POTASSIUM 500; 125 MG/1; MG/1
1 TABLET, FILM COATED ORAL EVERY 8 HOURS SCHEDULED
Status: DISCONTINUED | OUTPATIENT
Start: 2024-07-12 | End: 2024-07-13 | Stop reason: HOSPADM

## 2024-07-12 RX ORDER — ASPIRIN 81 MG/1
81 TABLET ORAL DAILY
Status: DISCONTINUED | OUTPATIENT
Start: 2024-07-12 | End: 2024-07-13 | Stop reason: HOSPADM

## 2024-07-12 RX ORDER — SODIUM CHLORIDE 9 MG/ML
INJECTION, SOLUTION INTRAVENOUS PRN
Status: DISCONTINUED | OUTPATIENT
Start: 2024-07-12 | End: 2024-07-13 | Stop reason: HOSPADM

## 2024-07-12 RX ORDER — SODIUM CHLORIDE 0.9 % (FLUSH) 0.9 %
5-40 SYRINGE (ML) INJECTION PRN
Status: DISCONTINUED | OUTPATIENT
Start: 2024-07-12 | End: 2024-07-13 | Stop reason: HOSPADM

## 2024-07-12 RX ORDER — ONDANSETRON 4 MG/1
4 TABLET, ORALLY DISINTEGRATING ORAL EVERY 8 HOURS PRN
Status: DISCONTINUED | OUTPATIENT
Start: 2024-07-12 | End: 2024-07-13 | Stop reason: HOSPADM

## 2024-07-12 RX ORDER — HEPARIN SODIUM 10000 [USP'U]/100ML
5-30 INJECTION, SOLUTION INTRAVENOUS CONTINUOUS
Status: DISCONTINUED | OUTPATIENT
Start: 2024-07-12 | End: 2024-07-12

## 2024-07-12 RX ORDER — ACETAMINOPHEN 650 MG/1
650 SUPPOSITORY RECTAL EVERY 6 HOURS PRN
Status: DISCONTINUED | OUTPATIENT
Start: 2024-07-12 | End: 2024-07-12

## 2024-07-12 RX ORDER — ONDANSETRON 2 MG/ML
4 INJECTION INTRAMUSCULAR; INTRAVENOUS EVERY 6 HOURS PRN
Status: DISCONTINUED | OUTPATIENT
Start: 2024-07-12 | End: 2024-07-13 | Stop reason: HOSPADM

## 2024-07-12 RX ORDER — HEPARIN SODIUM 1000 [USP'U]/ML
80 INJECTION, SOLUTION INTRAVENOUS; SUBCUTANEOUS ONCE
Status: COMPLETED | OUTPATIENT
Start: 2024-07-12 | End: 2024-07-12

## 2024-07-12 RX ADMIN — HEPARIN SODIUM AND DEXTROSE 18 UNITS/KG/HR: 10000; 5 INJECTION INTRAVENOUS at 15:19

## 2024-07-12 RX ADMIN — AMOXICILLIN AND CLAVULANATE POTASSIUM 1 TABLET: 500; 125 TABLET, FILM COATED ORAL at 21:22

## 2024-07-12 RX ADMIN — ACETAMINOPHEN 1000 MG: 500 TABLET ORAL at 17:24

## 2024-07-12 RX ADMIN — APIXABAN 10 MG: 5 TABLET, FILM COATED ORAL at 17:24

## 2024-07-12 RX ADMIN — HEPARIN SODIUM 6900 UNITS: 1000 INJECTION INTRAVENOUS; SUBCUTANEOUS at 15:16

## 2024-07-12 RX ADMIN — IOPAMIDOL 80 ML: 755 INJECTION, SOLUTION INTRAVENOUS at 13:37

## 2024-07-12 ASSESSMENT — ENCOUNTER SYMPTOMS
VOMITING: 0
SHORTNESS OF BREATH: 1
NAUSEA: 0
DIARRHEA: 0
COUGH: 0
CONSTIPATION: 0
WHEEZING: 0
BACK PAIN: 0

## 2024-07-12 ASSESSMENT — PAIN - FUNCTIONAL ASSESSMENT: PAIN_FUNCTIONAL_ASSESSMENT: 0-10

## 2024-07-12 ASSESSMENT — PAIN DESCRIPTION - LOCATION: LOCATION: LEG

## 2024-07-12 ASSESSMENT — PAIN SCALES - GENERAL: PAINLEVEL_OUTOF10: 10

## 2024-07-12 ASSESSMENT — PAIN DESCRIPTION - ORIENTATION: ORIENTATION: LEFT

## 2024-07-12 NOTE — H&P
once a week 9/1/23   Park Epperson MD   augmented betamethasone dipropionate (DIPROLENE-AF) 0.05 % ointment Apply topically 2 times daily as needed 4/26/21   Automatic Reconciliation, Ar   predniSONE (DELTASONE) 20 MG tablet Take 1 tablet by mouth daily as needed  Patient not taking: Reported on 7/12/2024 11/1/22   Automatic Reconciliation, Ar       Allergies  Allergies   Allergen Reactions    Codeine Other (See Comments)     Talking out of head       Past Medical History:   Diagnosis Date    BPH (benign prostatic hypertrophy) 11/19/2012    Eczema 11/19/2012    Elevated cholesterol 11/19/2012    GERD (gastroesophageal reflux disease) 11/11/2016    History of renal cell cancer 11/19/2012    Clear cell    HTN (hypertension) 11/19/2012    Iritis 11/19/2012    RA (rheumatoid arthritis) (HCC) 11/19/2012    Renal cell cancer (HCC) 11/19/2012       Past Surgical History:   Procedure Laterality Date    KIDNEY REMOVAL         Family History   Problem Relation Age of Onset    Stroke Maternal Grandmother     Anemia Mother     Rheum Arthritis Sister        Social History  Social History     Socioeconomic History    Marital status: Single     Spouse name: Not on file    Number of children: Not on file    Years of education: Not on file    Highest education level: Not on file   Occupational History    Not on file   Tobacco Use    Smoking status: Never     Passive exposure: Never    Smokeless tobacco: Never   Vaping Use    Vaping Use: Never used   Substance and Sexual Activity    Alcohol use: Yes     Alcohol/week: 8.0 - 9.0 standard drinks of alcohol    Drug use: No    Sexual activity: Not on file   Other Topics Concern    Not on file   Social History Narrative    Not on file     Social Determinants of Health     Financial Resource Strain: Low Risk  (9/7/2023)    Overall Financial Resource Strain (CARDIA)     Difficulty of Paying Living Expenses: Not hard at all   Food Insecurity: Not on file (9/7/2023)   Transportation Needs:  previously on Eliquis, HTN, HLD, RA on MTX, RCC s/p nephrectomy, and eczema who is admitted for PE/DVT and concern for cellulitis in LLE venous ulcer.    PE/DVT  Prior hx of PE/DVT on Eliquis  Pt reports progressively worsening SOB on exertion and now at rest for 6 mo. Noted LLE swelling and pain in last several weeks. Hx of PE/DVT in 8/2023, treated with 3mo of Eliquis. CTA Chest with bilateral PE. Satting 100% on RA. No respiratory distress currently, HDS. Coags wnl. This is now the second episode of PE/DVT, may require further workup into hypercoagulable state especially given hx of cancer and AI disease (RA).   - Admit to Observation  - Monitor vitals per unit protocol  - I&O  - O2 prn, wean as tolerated  - Eliquis 10mg BID for 7d; then 5mg indefinitely  - holding home ASA 81  - Daily CBC, BMP  - A1c, Lipids  - US Duplex LLE ordered  - Tylenol 1g q6h scheduled for pain  - Regular diet    Venous ulcer, concern for cellulitis  Pt with 3 weeks of progressively worsening venous ulcer on LLE with spreading erythema and extremely TTP. Not concerned for sepsis, 1/4 SIRS (WBC <4k). No hx of skin infections before. Likely due to fluid extravasation and edema with DVT. High suspicion for cellulitis given exam findings and concurrent DVT, predisposing to infection.   - Augmentin 500-125mg q8h for 10d  - Consulted wound care; appreciate recs    Elevated Cr  POA Cr 1.61 (bl: 1.3), BUN/Cr: 7; likely 2/2 IVVD.  - no IVF currently  - monitor on daily labs  - If does not improve, consider urine lytes  - Avoid nephrotoxic meds    Elevated BNP 3892 POA. Euvolemic on exam. No hx of CHF. CTA without pulm edema. Likely secondary to strain on heart given PE.   - continue to monitor fluid status    Hypertension: POA /69. Remains normotensive. Home meds Losartan 100mg qd, amlodipine 10mg qd.   - Holding home medications given controlled BP   - Will continue to monitor at this time and readjust as BP's trend.

## 2024-07-12 NOTE — ASSESSMENT & PLAN NOTE
Signs and symptoms most concerning for DVT with PE and concern fro R heart strain given the need to come off blood pressure meds and slight irregular rhythm. Recommend going to emergency room downstairs now. Calling ahead. Other differential is progressive heart failure/falve abnormality given slight murmur + venous stasis 2/2 venous damage from prior clot.

## 2024-07-12 NOTE — ED TRIAGE NOTES
Pt arrives to the ER for complaints of swelling to left leg that started about three weeks ago. Pt states that he has been having shortness of breath ever since he had a blood clot in last August. Pt reports that the SOB has gotten worse since 6/7.    Pt also reports that he has a sore to his left lower extremity.     Pt reports his PCP sent him to the ER to rule out a blood clot.     Denies any cough or chest pain.

## 2024-07-12 NOTE — ED PROVIDER NOTES
[facility] EMERGENCY DEPT  EMERGENCY DEPARTMENT ENCOUNTER      Pt Name: Joe Kaur  MRN: [id number]  Birthdate 1955  Date of evaluation: 7/12/2024  Provider: Linden Gonsales DO      HISTORY OF PRESENT ILLNESS      HPI    69-year-old male with history as below presents to the emergency department referred by his primary care doctor with concerns for recurrent DVT and possible PE.  He reportedly has a prior DVT history and was treated with blood thinners for 3 months but for the last 3 weeks he has had increasing left leg swelling.  He was noted to have a venous stasis type ulcer to his left lower leg but denies any trauma to it.  He also notes increasing shortness of breath significantly worsening since 6/7.  He notes specific dyspnea on exertion but denies any chest pain or pleurisy.  He denies any cough or URI symptoms or fever or chills.  He is on no blood thinners currently.    Nursing Notes were reviewed.    REVIEW OF SYSTEMS         Review of Systems        PAST MEDICAL HISTORY     Past Medical History:   Diagnosis Date    BPH (benign prostatic hypertrophy) 11/19/2012    Eczema 11/19/2012    Elevated cholesterol 11/19/2012    GERD (gastroesophageal reflux disease) 11/11/2016    History of renal cell cancer 11/19/2012    Clear cell    HTN (hypertension) 11/19/2012    Iritis 11/19/2012    RA (rheumatoid arthritis) (HCC) 11/19/2012    Renal cell cancer (HCC) 11/19/2012         SURGICAL HISTORY       Past Surgical History:   Procedure Laterality Date    KIDNEY REMOVAL           CURRENT MEDICATIONS       Previous Medications    AMLODIPINE (NORVASC) 10 MG TABLET    Take 1 tablet by mouth daily    ASPIRIN 81 MG EC TABLET    Take 1 tablet by mouth daily    AUGMENTED BETAMETHASONE DIPROPIONATE (DIPROLENE-AF) 0.05 % OINTMENT    Apply topically 2 times daily as needed    LOSARTAN (COZAAR) 100 MG TABLET    Take 1 tablet by mouth daily    METHOTREXATE (RHEUMATREX) 2.5 MG CHEMO TABLET    Take 8 tablets by mouth once

## 2024-07-12 NOTE — PROGRESS NOTES
Chief Complaint   Patient presents with    Follow-up     Has a sore on his L leg for about three weeks.  Went to the pharmacy and got some supplies to treat which is no helping.  Has noticed increase in sob with exertion lately which is causing him to be sent home from work.  [employer] sauer apt with cars at the end of the month but wants to be seen sooner.      Hypertension     Has not taken his bp meds due to having some low reading at home 85/44 in the last week

## 2024-07-12 NOTE — CARE COORDINATION
Initial Case Management Assessment           07/12/24 1522   Service Assessment   Patient Orientation Alert and Oriented   Cognition Alert   History Provided By Patient   Primary Caregiver Self   Support Systems Family Members   Patient's Healthcare Decision Maker is: Legal Next of Kin  (daughter Destiny Kaur [phone])   PCP Verified by CM Yes  (Dr. Wisdom)   Last Visit to PCP Within last 3 months   Prior Functional Level Independent in ADLs/IADLs   Can patient return to prior living arrangement Yes   Ability to make needs known: Good   Family able to assist with home care needs: Yes   Financial Resources Other (Comment)  ([employer] medicare)   Social/Functional History   Lives With Family   Type of Home House   Home Layout Two level   Home Access Stairs to enter with rails   Entrance Stairs - Number of Steps 4 garage, 7 front   Bathroom Equipment Grab bars in shower   Home Equipment Cane   ADL Assistance Independent   Homemaking Assistance Independent   Ambulation Assistance Independent   Transfer Assistance Independent   Active  Yes   Discharge Planning   Type of Residence House   Living Arrangements Friends   Current Services Prior To Admission None   Potential Assistance Purchasing Medications No   Patient expects to be discharged to: House   One/Two Story Residence Two story   # of Interior Steps 13   Services At/After Discharge   [place] Resource Information Provided? No   Mode of Transport at Discharge Other (see comment)  (family)           Patient was admitted on 7/12/24 for a blood clot. This CM met with patient at bedside, introduced self, explained role, and confirmed demographic information on face sheet. Patient lives at home with friend who is able to assist at discharge if needed. Pt is independent with all ADL's and drives. Pt has a cane and grab bars in the shower.        HH: no hx  IPR/SNF: no hx  AD: legal NOK daughter Destiny Kaur [phone], does not have paperwork. Declines at  this time.  PCP: Dr. Wisdom  Emergency Contact: daughter Destiny Kaur [phone]   DC ride: family         Patient plans to dc home once medically stable. CM will continue to follow.           ___________________________________________   Dania Luz RN Case Manager  7/12/2024   3:27 PM

## 2024-07-12 NOTE — PROGRESS NOTES
SUBJECTIVES  with respective ASSESSMENT/PLAN:  Mr. Joe Kaur is a 69 y.o. male established patient who presents for Follow-up (Has a sore on his L leg for about three weeks.  Went to the pharmacy and got some supplies to treat which is no helping.  Has noticed increase in sob with exertion lately which is causing him to be sent home from work.  [employer] apt with cars at the end of the month but wants to be seen sooner./) and Hypertension (Has not taken his bp meds due to having some low reading at home 85/44 in the last week /)  , found to have the followin. Exertional dyspnea  Overview:  6 months of nonprogressive intermittent exertional dyspnea improved with rest.  No associated chest pains or lightheadedness.  Comes in the wake of his recent acute DVT of the left lower extremity including the femoral and external iliac vein.  Some signs of small left lower lobe PE possibly chronic versus acute on his CTA at that time.  Completed 3 months of anticoagulation medication Eliquis and then discontinued.  No follow-up imaging performed.    Notable exam findings include irregular heartbeat and aortic ejection murmur that radiates to other areas of the heart.  Patient otherwise feels asymptomatic in the setting of these things.    Ultimate goal is to feel safe playing tennis again so he can get active.    Distant history of cocaine use in his youth.  Comorbidities include hypertension, history of renal cell cancer, immunosuppression secondary to rheumatoid arthritis.  Coronary artery calcifications.      Interval Hx:  - since last visit, has had progression of dyspnea, now happening at rest and has new L leg swelling and venous ulcer formation. Also has had lower blood pressures necessitating taking self off home BP medications. No episodes of syncope.  Assessment & Plan:  Signs and symptoms most concerning for DVT with PE and concern fro R heart strain given the need to come off blood pressure meds and

## 2024-07-12 NOTE — ED NOTES
12:17 PM    Patient was seen by PCP today and sent to the ED by him, Dr Wisdom. He is having left leg swelling for a couple weeks, and also short of breath. History of DVT last August.  Off blood thinner and also short of breath for about a week or so, but no chest pain.      I have evaluated the patient as the Provider in Rapid Medical Evaluation (RME). I have reviewed his vital signs and the triage nurse assessment. I have talked with the patient and any available family and advised that I am the provider in triage and have ordered the appropriate study to initiate their work up based on the clinical presentation during my assessment. I have advised that the patient will be accommodated in the Main ED as soon as possible. I have also requested to contact the triage nurse or myself immediately if the patient experiences any changes in their condition during this brief waiting period.  DIONNE Rodriguez, Radha NUNO PA-C  07/12/24 1063

## 2024-07-13 VITALS
RESPIRATION RATE: 16 BRPM | OXYGEN SATURATION: 99 % | WEIGHT: 190 LBS | HEIGHT: 70 IN | TEMPERATURE: 97.8 F | SYSTOLIC BLOOD PRESSURE: 123 MMHG | BODY MASS INDEX: 27.2 KG/M2 | DIASTOLIC BLOOD PRESSURE: 62 MMHG | HEART RATE: 64 BPM

## 2024-07-13 PROBLEM — I26.09 ACUTE PULMONARY EMBOLISM WITH ACUTE COR PULMONALE (HCC): Status: ACTIVE | Noted: 2024-07-13

## 2024-07-13 PROBLEM — I83.892 VENOUS STASIS ULCER OF LEFT LOWER LEG WITH EDEMA OF LEFT LOWER LEG (HCC): Status: ACTIVE | Noted: 2024-07-13

## 2024-07-13 PROBLEM — L03.116 CELLULITIS OF LEFT LEG: Status: ACTIVE | Noted: 2024-07-13

## 2024-07-13 PROBLEM — L97.929 VENOUS STASIS ULCER OF LEFT LOWER LEG WITH EDEMA OF LEFT LOWER LEG (HCC): Status: ACTIVE | Noted: 2024-07-13

## 2024-07-13 PROBLEM — R60.0 VENOUS STASIS ULCER OF LEFT LOWER LEG WITH EDEMA OF LEFT LOWER LEG (HCC): Status: ACTIVE | Noted: 2024-07-13

## 2024-07-13 PROBLEM — I83.029 VENOUS STASIS ULCER OF LEFT LOWER LEG WITH EDEMA OF LEFT LOWER LEG (HCC): Status: ACTIVE | Noted: 2024-07-13

## 2024-07-13 LAB
ANION GAP SERPL CALC-SCNC: 7 MMOL/L (ref 5–15)
BASOPHILS # BLD: 0 K/UL (ref 0–0.1)
BASOPHILS NFR BLD: 0 % (ref 0–1)
BUN SERPL-MCNC: 16 MG/DL (ref 6–20)
BUN/CREAT SERPL: 11 (ref 12–20)
CALCIUM SERPL-MCNC: 8.3 MG/DL (ref 8.5–10.1)
CHLORIDE SERPL-SCNC: 107 MMOL/L (ref 97–108)
CHOLEST SERPL-MCNC: 100 MG/DL
CO2 SERPL-SCNC: 21 MMOL/L (ref 21–32)
CREAT SERPL-MCNC: 1.4 MG/DL (ref 0.7–1.3)
DIFFERENTIAL METHOD BLD: ABNORMAL
ECHO BSA: 2.06 M2
EOSINOPHIL # BLD: 0 K/UL (ref 0–0.4)
EOSINOPHIL NFR BLD: 1 % (ref 0–7)
ERYTHROCYTE [DISTWIDTH] IN BLOOD BY AUTOMATED COUNT: 15.4 % (ref 11.5–14.5)
EST. AVERAGE GLUCOSE BLD GHB EST-MCNC: 97 MG/DL
GLUCOSE SERPL-MCNC: 104 MG/DL (ref 65–100)
HBA1C MFR BLD: 5 % (ref 4–5.6)
HCT VFR BLD AUTO: 19.9 % (ref 36.6–50.3)
HCT VFR BLD AUTO: 20.7 % (ref 36.6–50.3)
HDLC SERPL-MCNC: 34 MG/DL
HDLC SERPL: 2.9 (ref 0–5)
HGB BLD-MCNC: 7.2 G/DL (ref 12.1–17)
HGB BLD-MCNC: 7.4 G/DL (ref 12.1–17)
IMM GRANULOCYTES # BLD AUTO: 0 K/UL
IMM GRANULOCYTES NFR BLD AUTO: 0 %
LDLC SERPL CALC-MCNC: 54.4 MG/DL (ref 0–100)
LYMPHOCYTES # BLD: 1.1 K/UL (ref 0.8–3.5)
LYMPHOCYTES NFR BLD: 47 % (ref 12–49)
MCH RBC QN AUTO: 38.3 PG (ref 26–34)
MCHC RBC AUTO-ENTMCNC: 36.2 G/DL (ref 30–36.5)
MCV RBC AUTO: 105.9 FL (ref 80–99)
MONOCYTES # BLD: 0 K/UL (ref 0–1)
MONOCYTES NFR BLD: 1 % (ref 5–13)
NEUTS BAND NFR BLD MANUAL: 1 % (ref 0–6)
NEUTS SEG # BLD: 1.2 K/UL (ref 1.8–8)
NEUTS SEG NFR BLD: 50 % (ref 32–75)
NRBC # BLD: 0 K/UL (ref 0–0.01)
NRBC BLD-RTO: 0 PER 100 WBC
PLATELET # BLD AUTO: 96 K/UL (ref 150–400)
PMV BLD AUTO: 12.4 FL (ref 8.9–12.9)
POTASSIUM SERPL-SCNC: 3.7 MMOL/L (ref 3.5–5.1)
RBC # BLD AUTO: 1.88 M/UL (ref 4.1–5.7)
RBC MORPH BLD: ABNORMAL
SODIUM SERPL-SCNC: 135 MMOL/L (ref 136–145)
TRIGL SERPL-MCNC: 58 MG/DL
VLDLC SERPL CALC-MCNC: 11.6 MG/DL
WBC # BLD AUTO: 2.3 K/UL (ref 4.1–11.1)

## 2024-07-13 PROCEDURE — 85025 COMPLETE CBC W/AUTO DIFF WBC: CPT

## 2024-07-13 PROCEDURE — 99238 HOSP IP/OBS DSCHRG MGMT 30/<: CPT

## 2024-07-13 PROCEDURE — 6370000000 HC RX 637 (ALT 250 FOR IP)

## 2024-07-13 PROCEDURE — 83036 HEMOGLOBIN GLYCOSYLATED A1C: CPT

## 2024-07-13 PROCEDURE — 80061 LIPID PANEL: CPT

## 2024-07-13 PROCEDURE — 80048 BASIC METABOLIC PNL TOTAL CA: CPT

## 2024-07-13 PROCEDURE — 36415 COLL VENOUS BLD VENIPUNCTURE: CPT

## 2024-07-13 PROCEDURE — 85018 HEMOGLOBIN: CPT

## 2024-07-13 PROCEDURE — 99223 1ST HOSP IP/OBS HIGH 75: CPT

## 2024-07-13 PROCEDURE — 94761 N-INVAS EAR/PLS OXIMETRY MLT: CPT

## 2024-07-13 PROCEDURE — 85014 HEMATOCRIT: CPT

## 2024-07-13 PROCEDURE — 2580000003 HC RX 258

## 2024-07-13 PROCEDURE — G0378 HOSPITAL OBSERVATION PER HR: HCPCS

## 2024-07-13 RX ORDER — AMOXICILLIN AND CLAVULANATE POTASSIUM 500; 125 MG/1; MG/1
1 TABLET, FILM COATED ORAL EVERY 8 HOURS SCHEDULED
Qty: 28 TABLET | Refills: 0 | Status: CANCELLED | OUTPATIENT
Start: 2024-07-13 | End: 2024-07-23

## 2024-07-13 RX ORDER — AMOXICILLIN 500 MG/1
500 CAPSULE ORAL EVERY 8 HOURS
Qty: 30 CAPSULE | Refills: 0 | Status: SHIPPED | OUTPATIENT
Start: 2024-07-13 | End: 2024-07-23

## 2024-07-13 RX ORDER — FOLIC ACID 1 MG/1
1 TABLET ORAL DAILY
Qty: 90 TABLET | Refills: 1 | Status: SHIPPED | OUTPATIENT
Start: 2024-07-13

## 2024-07-13 RX ADMIN — ACETAMINOPHEN 1000 MG: 500 TABLET ORAL at 13:20

## 2024-07-13 RX ADMIN — ACETAMINOPHEN 1000 MG: 500 TABLET ORAL at 06:13

## 2024-07-13 RX ADMIN — SODIUM CHLORIDE, PRESERVATIVE FREE 10 ML: 5 INJECTION INTRAVENOUS at 08:24

## 2024-07-13 RX ADMIN — ACETAMINOPHEN 1000 MG: 500 TABLET ORAL at 00:04

## 2024-07-13 RX ADMIN — AMOXICILLIN AND CLAVULANATE POTASSIUM 1 TABLET: 500; 125 TABLET, FILM COATED ORAL at 13:19

## 2024-07-13 RX ADMIN — APIXABAN 10 MG: 5 TABLET, FILM COATED ORAL at 08:21

## 2024-07-13 RX ADMIN — AMOXICILLIN AND CLAVULANATE POTASSIUM 1 TABLET: 500; 125 TABLET, FILM COATED ORAL at 06:13

## 2024-07-13 ASSESSMENT — PAIN SCALES - GENERAL
PAINLEVEL_OUTOF10: 0
PAINLEVEL_OUTOF10: 0

## 2024-07-13 NOTE — DISCHARGE SUMMARY
[address] [place] [address]   Office [phone]  Fax [phone]       Discharge / Transfer / Off-Service Note     Name: Joe Kaur MRN: [id number]  Sex: Male   YOB: 1955  Age: 69 y.o.  PCP: Ochoa Wisdom MD     Date of admission: 7/12/2024  Date of discharge/transfer: 7/13/2024    Attending physician at admission: Kiah Griffiths.  Attending physician at discharge/transfer: Elbert Lundberg.  Resident physician at discharge/transfer: Shirley Rausch MD     Consultants during hospitalization  IP WOUND CARE NURSE CONSULT TO EVAL     Admission diagnoses   Shortness of breath [R06.02]  MADDOX (dyspnea on exertion) [R06.09]  Elevated serum creatinine [R79.89]  VTE (venous thromboembolism) [I82.90]  Elevated brain natriuretic peptide (BNP) level [R79.89]  Pancytopenia (HCC) [D61.818]  Venous stasis ulcer of left lower leg with edema of left lower leg (HCC) [I83.029, I83.892, L97.929, R60.0]  Acute pulmonary embolism with acute cor pulmonale, unspecified pulmonary embolism type (HCC) [I26.09]    Recommended follow-up after discharge    1. PCP-Ochoa Wisdom MD  2. Rheumatologist - Dr. Epperson  3. Hematology referral     To follow up on with PCP:   - repeat CBC to assess for macrocytic anemia after new folic acid supplementation for methotrexate tx  - ensure adherence to medications, especially Eliquis and folic acid  - assess resolution of cellulitis on antibiotics  - ensure follow up at above specialists   ------------------------------------------------------------------------------------------------------------------    History of Present Illness    As per admitting provider, Dr. Rausch:   \"Joe Kaur is a 69 y.o. male with known history of DVT/PE previously on Eliquis, HTN, HLD, RA on MTX, RCC s/p nephrectomy, BPH, GERD, and eczema who presents to the ER complaining of SOB and LLE swelling/pain. Has had SOB on exertion for 6 mo, improves with rest. However, noticed increase in SOB in  patient/family  Please seek medical attention for any new or worsening symptoms particularly chest pain, shortness of breath, fever, chills, nausea, vomiting, diarrhea, change in mentation, falling, weakness, bleeding.    Follow up plans/appointments  Follow-up Information       Follow up With Specialties Details Why Contact Info    Rivera Marquez MD Hematology and Oncology, Hematology, Oncology Schedule an appointment as soon as possible for a visit  [address] [facility]  [address]  [facility] [address] [phone]               Shirley Rausch MD  [facility] Resident       For Billing    Chief Complaint   Patient presents with    Shortness of Breath

## 2024-07-13 NOTE — FLOWSHEET NOTE
Discharge instructions reviewed with patient; patient verbalized understanding and opportunity for questions/answers provided.

## 2024-07-13 NOTE — DISCHARGE INSTRUCTIONS
HOME DISCHARGE INSTRUCTIONS    Joe Kaur / [id number] : 1955    Admission date: 2024 Discharge date: 2024     Please bring this form with you to show your care provider at your follow-up appointment.    Primary care provider:  Ochoa Wisdom      Discharging provider:  Shirley Rausch MD  - Family Medicine Resident  Dr. Elbetr Lundberg - Attending     You have been admitted to the hospital with the following diagnoses:    ACUTE DIAGNOSES:  Shortness of breath [R06.02]  MADDOX (dyspnea on exertion) [R06.09]  Elevated serum creatinine [R79.89]  VTE (venous thromboembolism) [I82.90]  Elevated brain natriuretic peptide (BNP) level [R79.89]  Pancytopenia (HCC) [D61.818]  Venous stasis ulcer of left lower leg with edema of left lower leg (HCC) [I83.029, I83.892, L97.929, R60.0]  Acute pulmonary embolism with acute cor pulmonale, unspecified pulmonary embolism type (HCC) [I26.09]      . . . . . . . . . . . . . . . . . . . . . . . . . . . . . . . . . . . . . . . . . . . . . . . . . . . . . . . . . . . . . . . . . . . . . . . .   You were hospitalized for shortness of breath and an ulcer on your left leg. On imaging of your chest and legs, you were found to have blood clots in your left leg vessels and your lung's blood vessels. Your breathing improved over time with oxygen support. You were given Eliquis, a blood thinning medication, for these clots, which you will need to take indefinitely. You also were found to cellulitis in your left leg, which is a skin infection. You were given antibiotics which should be finished at home to treat the infection.     You also had low blood levels, likely due to the Methotrexate medication you take for your rheumatoid arthritis. Please continue taking Methotrexate as prescribed. However, we have sent a referral for you to see a Hematologist (blood doctor) outpatient so this can be addressed, as well as to address your repeated blood clots. We will also prescribe a  Date/Time                                                                                                                                              Patient or Representative Signature                                                          Date/Time

## 2024-07-15 ENCOUNTER — TELEPHONE (OUTPATIENT)
Facility: CLINIC | Age: 69
End: 2024-07-15

## 2024-07-15 NOTE — TELEPHONE ENCOUNTER
Pt placed on our wait list, as soon as an appt becomes available we will schedule a ROBSON.    ----- Message from Garo Maldonado MD sent at 7/13/2024  2:31 PM EDT -----  Regarding: hospital f/u  Can we please schedule this pt for a hospital follow up   They were admitted to [facility] for acute PE and LLE cellulitis

## 2024-07-16 ENCOUNTER — TELEPHONE (OUTPATIENT)
Facility: CLINIC | Age: 69
End: 2024-07-16

## 2024-07-16 NOTE — TELEPHONE ENCOUNTER
Care Transitions Initial Follow Up Call    Outreach made within 2 business days of discharge: Yes    Patient: Joe Kaur Patient : 1955   MRN: [id number]  Reason for Admission: There are no discharge diagnoses documented for the most recent discharge.  Discharge Date: 24       Spoke with: LVM      Scheduled appointment with PCP within 7-14 days    Follow Up  Future Appointments   Date Time Provider Department Center   2024  9:30 AM SFM STRESS LAB 1 [facility]   2024 10:00 AM M ECHO LAB [facility]   2024 11:00 AM Park Epperson MD AOCR BS [facility]   2024 11:00 AM Rivera Marquez MD [facility]   10/17/2024 10:30 AM Ochoa Wisdom MD CCFP [facility]       Emilie Sandoval MA

## 2024-07-19 ENCOUNTER — OFFICE VISIT (OUTPATIENT)
Facility: CLINIC | Age: 69
End: 2024-07-19

## 2024-07-19 VITALS
HEART RATE: 79 BPM | OXYGEN SATURATION: 97 % | TEMPERATURE: 97.9 F | DIASTOLIC BLOOD PRESSURE: 79 MMHG | RESPIRATION RATE: 20 BRPM | SYSTOLIC BLOOD PRESSURE: 136 MMHG

## 2024-07-19 DIAGNOSIS — L03.116 CELLULITIS OF LEFT LEG: ICD-10-CM

## 2024-07-19 DIAGNOSIS — R60.0 VENOUS STASIS ULCER OF LEFT LOWER LEG WITH EDEMA OF LEFT LOWER LEG (HCC): ICD-10-CM

## 2024-07-19 DIAGNOSIS — Z09 HOSPITAL DISCHARGE FOLLOW-UP: Primary | ICD-10-CM

## 2024-07-19 DIAGNOSIS — I82.90 VTE (VENOUS THROMBOEMBOLISM): ICD-10-CM

## 2024-07-19 DIAGNOSIS — I83.892 VENOUS STASIS ULCER OF LEFT LOWER LEG WITH EDEMA OF LEFT LOWER LEG (HCC): ICD-10-CM

## 2024-07-19 DIAGNOSIS — L97.929 VENOUS STASIS ULCER OF LEFT LOWER LEG WITH EDEMA OF LEFT LOWER LEG (HCC): ICD-10-CM

## 2024-07-19 DIAGNOSIS — I83.029 VENOUS STASIS ULCER OF LEFT LOWER LEG WITH EDEMA OF LEFT LOWER LEG (HCC): ICD-10-CM

## 2024-07-19 PROBLEM — L97.329 VENOUS STASIS ULCER OF LEFT ANKLE WITHOUT VARICOSE VEINS (HCC): Status: RESOLVED | Noted: 2024-07-12 | Resolved: 2024-07-19

## 2024-07-19 PROBLEM — I87.2 VENOUS STASIS ULCER OF LEFT ANKLE WITHOUT VARICOSE VEINS (HCC): Status: RESOLVED | Noted: 2024-07-12 | Resolved: 2024-07-19

## 2024-07-19 RX ORDER — ACETAMINOPHEN 500 MG
1000 TABLET ORAL EVERY 6 HOURS PRN
Qty: 540 TABLET | Refills: 1 | COMMUNITY
Start: 2024-07-19

## 2024-07-19 RX ORDER — OXYCODONE HYDROCHLORIDE 5 MG/1
5 TABLET ORAL EVERY 6 HOURS PRN
Qty: 28 TABLET | Refills: 0 | Status: SHIPPED | OUTPATIENT
Start: 2024-07-19 | End: 2024-07-26

## 2024-07-19 NOTE — PROGRESS NOTES
Chief Complaint   Patient presents with    Follow-Up from Hospital     Hosp fu. Still having significant pains states tylenol given in hospital is not working well enough and needs something a little stronger.  Abscess on L leg feels it is not healing.  Not able to put pressure on leg secondary to pain.         
present.     Pt was counseled on risks, benefits and alternatives of treatment options. All questions were asked and answered and the patient was agreeable with the treatment plan as outlined.    Ochoa Wisdom MD  [facility]  [phone]

## 2024-07-26 RX ORDER — AMOXICILLIN 500 MG/1
CAPSULE ORAL
Qty: 30 CAPSULE | Refills: 0 | OUTPATIENT
Start: 2024-07-26

## 2024-07-30 ENCOUNTER — HOSPITAL ENCOUNTER (OUTPATIENT)
Facility: HOSPITAL | Age: 69
Discharge: HOME | End: 2024-08-01
Attending: FAMILY MEDICINE
Payer: MEDICARE

## 2024-07-30 VITALS
DIASTOLIC BLOOD PRESSURE: 79 MMHG | WEIGHT: 190 LBS | HEIGHT: 70 IN | SYSTOLIC BLOOD PRESSURE: 136 MMHG | BODY MASS INDEX: 27.2 KG/M2

## 2024-07-30 VITALS
BODY MASS INDEX: 27.2 KG/M2 | HEIGHT: 70 IN | HEART RATE: 66 BPM | DIASTOLIC BLOOD PRESSURE: 80 MMHG | SYSTOLIC BLOOD PRESSURE: 150 MMHG | WEIGHT: 190 LBS

## 2024-07-30 DIAGNOSIS — R06.09 EXERTIONAL DYSPNEA: ICD-10-CM

## 2024-07-30 DIAGNOSIS — R06.02 SHORTNESS OF BREATH: ICD-10-CM

## 2024-07-30 DIAGNOSIS — I49.9 IRREGULAR HEARTBEAT: ICD-10-CM

## 2024-07-30 DIAGNOSIS — R01.1 HEART MURMUR: ICD-10-CM

## 2024-07-30 LAB
ECHO AO ASC DIAM: 3.1 CM
ECHO AO ASCENDING AORTA INDEX: 1.52 CM/M2
ECHO AO ROOT DIAM: 3.2 CM
ECHO AO ROOT INDEX: 1.57 CM/M2
ECHO AV AREA PEAK VELOCITY: 2.1 CM2
ECHO AV AREA VTI: 2.4 CM2
ECHO AV AREA/BSA PEAK VELOCITY: 1 CM2/M2
ECHO AV AREA/BSA VTI: 1.2 CM2/M2
ECHO AV MEAN GRADIENT: 7 MMHG
ECHO AV MEAN VELOCITY: 1.2 M/S
ECHO AV PEAK GRADIENT: 17 MMHG
ECHO AV PEAK VELOCITY: 2.1 M/S
ECHO AV VELOCITY RATIO: 0.67
ECHO AV VTI: 38.3 CM
ECHO BSA: 2.06 M2
ECHO BSA: 2.06 M2
ECHO LA DIAMETER INDEX: 1.62 CM/M2
ECHO LA DIAMETER: 3.3 CM
ECHO LA TO AORTIC ROOT RATIO: 1.03
ECHO LA VOL A-L A2C: 94 ML (ref 18–58)
ECHO LA VOL A-L A4C: 122 ML (ref 18–58)
ECHO LA VOL BP: 102 ML (ref 18–58)
ECHO LA VOL MOD A2C: 92 ML (ref 18–58)
ECHO LA VOL MOD A4C: 113 ML (ref 18–58)
ECHO LA VOL/BSA BIPLANE: 50 ML/M2 (ref 16–34)
ECHO LA VOLUME AREA LENGTH: 108 ML
ECHO LA VOLUME INDEX A-L A2C: 46 ML/M2 (ref 16–34)
ECHO LA VOLUME INDEX A-L A4C: 60 ML/M2 (ref 16–34)
ECHO LA VOLUME INDEX AREA LENGTH: 53 ML/M2 (ref 16–34)
ECHO LA VOLUME INDEX MOD A2C: 45 ML/M2 (ref 16–34)
ECHO LA VOLUME INDEX MOD A4C: 55 ML/M2 (ref 16–34)
ECHO LV E' LATERAL VELOCITY: 9 CM/S
ECHO LV E' SEPTAL VELOCITY: 6 CM/S
ECHO LV EDV A2C: 94 ML
ECHO LV EDV A4C: 102 ML
ECHO LV EDV BP: 99 ML (ref 67–155)
ECHO LV EDV INDEX A4C: 50 ML/M2
ECHO LV EDV INDEX BP: 49 ML/M2
ECHO LV EDV NDEX A2C: 46 ML/M2
ECHO LV EJECTION FRACTION A2C: 77 %
ECHO LV EJECTION FRACTION A4C: 57 %
ECHO LV EJECTION FRACTION BIPLANE: 66 % (ref 55–100)
ECHO LV ESV A2C: 22 ML
ECHO LV ESV A4C: 44 ML
ECHO LV ESV BP: 33 ML (ref 22–58)
ECHO LV ESV INDEX A2C: 11 ML/M2
ECHO LV ESV INDEX A4C: 22 ML/M2
ECHO LV ESV INDEX BP: 16 ML/M2
ECHO LV FRACTIONAL SHORTENING: 38 % (ref 28–44)
ECHO LV INTERNAL DIMENSION DIASTOLE INDEX: 1.91 CM/M2
ECHO LV INTERNAL DIMENSION DIASTOLIC: 3.9 CM (ref 4.2–5.9)
ECHO LV INTERNAL DIMENSION SYSTOLIC INDEX: 1.18 CM/M2
ECHO LV INTERNAL DIMENSION SYSTOLIC: 2.4 CM
ECHO LV IVSD: 1.2 CM (ref 0.6–1)
ECHO LV MASS 2D: 140.1 G (ref 88–224)
ECHO LV MASS INDEX 2D: 68.7 G/M2 (ref 49–115)
ECHO LV POSTERIOR WALL DIASTOLIC: 1 CM (ref 0.6–1)
ECHO LV RELATIVE WALL THICKNESS RATIO: 0.51
ECHO LVOT AREA: 3.1 CM2
ECHO LVOT AV VTI INDEX: 0.79
ECHO LVOT DIAM: 2 CM
ECHO LVOT MEAN GRADIENT: 4 MMHG
ECHO LVOT PEAK GRADIENT: 8 MMHG
ECHO LVOT PEAK VELOCITY: 1.4 M/S
ECHO LVOT STROKE VOLUME INDEX: 46.3 ML/M2
ECHO LVOT SV: 94.5 ML
ECHO LVOT VTI: 30.1 CM
ECHO MV A VELOCITY: 1.35 M/S
ECHO MV AREA VTI: 2.4 CM2
ECHO MV E DECELERATION TIME (DT): 263.2 MS
ECHO MV E VELOCITY: 1.01 M/S
ECHO MV E/A RATIO: 0.75
ECHO MV E/E' LATERAL: 11.22
ECHO MV E/E' RATIO (AVERAGED): 14.03
ECHO MV E/E' SEPTAL: 16.83
ECHO MV LVOT VTI INDEX: 1.32
ECHO MV MAX VELOCITY: 1.3 M/S
ECHO MV MEAN GRADIENT: 4 MMHG
ECHO MV MEAN VELOCITY: 0.9 M/S
ECHO MV PEAK GRADIENT: 7 MMHG
ECHO MV VTI: 39.8 CM
ECHO PV MAX VELOCITY: 0.8 M/S
ECHO PV PEAK GRADIENT: 3 MMHG
ECHO RV INTERNAL DIMENSION: 4.1 CM
ECHO RV TAPSE: 2.4 CM (ref 1.7–?)
ECHO TV REGURGITANT MAX VELOCITY: 1.03 M/S
ECHO TV REGURGITANT PEAK GRADIENT: 4 MMHG
STRESS ANGINA INDEX: 0
STRESS BASELINE DIAS BP: 80 MMHG
STRESS BASELINE HR: 66 BPM
STRESS BASELINE SYS BP: 150 MMHG
STRESS ESTIMATED WORKLOAD: 5.8 METS
STRESS EXERCISE DUR MIN: 9 MIN
STRESS EXERCISE DUR SEC: 30 SEC
STRESS PEAK DIAS BP: 78 MMHG
STRESS PEAK SYS BP: 170 MMHG
STRESS PERCENT HR ACHIEVED: 87 %
STRESS POST PEAK HR: 130 BPM
STRESS RATE PRESSURE PRODUCT: NORMAL BPM*MMHG
STRESS TARGET HR: 149 BPM

## 2024-07-30 PROCEDURE — 93018 CV STRESS TEST I&R ONLY: CPT | Performed by: INTERNAL MEDICINE

## 2024-07-30 PROCEDURE — 93017 CV STRESS TEST TRACING ONLY: CPT

## 2024-07-30 PROCEDURE — 93306 TTE W/DOPPLER COMPLETE: CPT

## 2024-07-30 PROCEDURE — 93306 TTE W/DOPPLER COMPLETE: CPT | Performed by: STUDENT IN AN ORGANIZED HEALTH CARE EDUCATION/TRAINING PROGRAM

## 2024-07-30 PROCEDURE — 93016 CV STRESS TEST SUPVJ ONLY: CPT | Performed by: INTERNAL MEDICINE

## 2024-08-01 DIAGNOSIS — I51.7 LEFT ATRIAL DILATATION: Primary | ICD-10-CM

## 2024-08-01 DIAGNOSIS — I49.1 PAC (PREMATURE ATRIAL CONTRACTION): ICD-10-CM

## 2024-08-01 DIAGNOSIS — I34.81 MITRAL VALVE ANNULAR CALCIFICATION: ICD-10-CM

## 2024-08-16 NOTE — PATIENT INSTRUCTIONS
Thank you for visiting [facility]!      For future medication refills, we require updated lab results and an upcoming appointment to be in our system prior to refilling prescriptions.  Without these two things, your refill will be DENIED.  If you miss your upcoming appointment, your refill will also be DENIED.      We appreciate your understanding of this critical clinical aspect of our practice. -Dr. Epperson & Emerita, NP

## 2024-08-26 ENCOUNTER — OFFICE VISIT (OUTPATIENT)
Age: 69
End: 2024-08-26
Payer: MEDICARE

## 2024-08-26 VITALS
TEMPERATURE: 98 F | SYSTOLIC BLOOD PRESSURE: 123 MMHG | HEART RATE: 69 BPM | RESPIRATION RATE: 16 BRPM | DIASTOLIC BLOOD PRESSURE: 72 MMHG | OXYGEN SATURATION: 97 % | WEIGHT: 191 LBS | BODY MASS INDEX: 27.41 KG/M2

## 2024-08-26 DIAGNOSIS — M05.79 RHEUMATOID ARTHRITIS WITH RHEUMATOID FACTOR OF MULTIPLE SITES WITHOUT ORGAN OR SYSTEMS INVOLVEMENT (HCC): ICD-10-CM

## 2024-08-26 DIAGNOSIS — I82.90 THROMBOSIS: Primary | ICD-10-CM

## 2024-08-26 PROCEDURE — 3078F DIAST BP <80 MM HG: CPT | Performed by: PEDIATRICS

## 2024-08-26 PROCEDURE — 99214 OFFICE O/P EST MOD 30 MIN: CPT | Performed by: PEDIATRICS

## 2024-08-26 PROCEDURE — G8427 DOCREV CUR MEDS BY ELIG CLIN: HCPCS | Performed by: PEDIATRICS

## 2024-08-26 PROCEDURE — 1036F TOBACCO NON-USER: CPT | Performed by: PEDIATRICS

## 2024-08-26 PROCEDURE — 3017F COLORECTAL CA SCREEN DOC REV: CPT | Performed by: PEDIATRICS

## 2024-08-26 PROCEDURE — 1123F ACP DISCUSS/DSCN MKR DOCD: CPT | Performed by: PEDIATRICS

## 2024-08-26 PROCEDURE — 3074F SYST BP LT 130 MM HG: CPT | Performed by: PEDIATRICS

## 2024-08-26 PROCEDURE — G8419 CALC BMI OUT NRM PARAM NOF/U: HCPCS | Performed by: PEDIATRICS

## 2024-08-26 RX ORDER — METHOTREXATE 2.5 MG/1
15 TABLET ORAL WEEKLY
Qty: 72 TABLET | Refills: 1 | Status: SHIPPED | OUTPATIENT
Start: 2024-08-26 | End: 2024-11-24

## 2024-08-26 RX ORDER — PREDNISONE 5 MG/1
TABLET ORAL
Qty: 30 TABLET | Refills: 1 | Status: SHIPPED | OUTPATIENT
Start: 2024-08-26

## 2024-08-26 ASSESSMENT — PATIENT HEALTH QUESTIONNAIRE - PHQ9
SUM OF ALL RESPONSES TO PHQ QUESTIONS 1-9: 0
1. LITTLE INTEREST OR PLEASURE IN DOING THINGS: NOT AT ALL
SUM OF ALL RESPONSES TO PHQ9 QUESTIONS 1 & 2: 0
SUM OF ALL RESPONSES TO PHQ QUESTIONS 1-9: 0
SUM OF ALL RESPONSES TO PHQ QUESTIONS 1-9: 0
2. FEELING DOWN, DEPRESSED OR HOPELESS: NOT AT ALL
SUM OF ALL RESPONSES TO PHQ QUESTIONS 1-9: 0

## 2024-08-26 NOTE — PROGRESS NOTES
Chief Complaint   Patient presents with    Joint Pain     1. Have you been to the ER, urgent care clinic since your last visit?  Hospitalized since your last visit? Yes [facility] for blood clot    2. Have you seen or consulted any other health care providers outside of the [facility] since your last visit?  Include any pap smears or colon screening. No

## 2024-08-26 NOTE — PROGRESS NOTES
RHEUMATOLOGY PROBLEM LIST AND CHIEF COMPLAINT  1. Seropositive rheumatoid arthritis - CCP (>250), RF (179.6)  2. Iritis     Immunosuppression Screening (6/04/2019):  Quantiferon TB: negative  PPD:  Not performed  Hepatitis B: negative  Hepatitis C: negative     Therapy History includes:  Current DMARD therapy include: methotrexate (11/24/2020 to present)  Prior DMARD therapy include: methotrexate 15 mg every Tuesday  Discontinued DMARDs because of inefficacy: None  Discontinued DMARDs because of side effects: none    INTERVAL HISTORY  This is a 69 y.o.  male.  Today, the patient complains of pain in the joints.  Location: generalized  Severity: 5 on a scale of 0-10  Timing: intermittent  Duration: 6 months   Context/Associated signs and symptoms: The patient continues on methotrexate 15 mg PO weekly. He has been doing well from an RA standpoint aside from intermittent jaw pain. He will take prednisone PRN for joint pain. He was hospitalized from 7/12-7/13 due to PE/DVT. He has restarted Eliquis and has an appt with Dr. Marquez (hematology) next month.     RHEUMATOLOGY REVIEW OF SYSTEMS   Positives as per history  Negatives as follows:  CONSTITUTlONAL:  Denies unexplained persistent fevers, weight change, chronic fatigue  HEAD/EYES:   Denies eye redness, blurry vision or sudden loss of vision, dry eyes, HA, temporal artery pain  ENT:    Denies oral/nasal ulcers, recurrent sinus infections, dry mouth  RESPIRATORY:  No pleuritic pain, history of pleural effusions, hemoptysis, exertional dyspnea  CARDIOVASCULAR: Denies chest pain, history of pericardial effusions  GASTRO:   Denies heartburn, esophageal dysmotility, abdominal pain, nausea, vomiting, diarrhea, blood in the stool  HEMATOLOGIC:  No easy bruising, purpura, swollen lymph nodes  SKIN:    Denies alopecia, ulcers, nodules, sun sensitivity, unexplained persistent rash   VASCULAR:   Denies edema, cyanosis, raynaud phenomenon  NEUROLOGIC:  Denies

## 2024-09-19 ENCOUNTER — OFFICE VISIT (OUTPATIENT)
Age: 69
End: 2024-09-19
Payer: MEDICARE

## 2024-09-19 VITALS
TEMPERATURE: 97.6 F | HEIGHT: 71 IN | WEIGHT: 191.8 LBS | BODY MASS INDEX: 26.85 KG/M2 | OXYGEN SATURATION: 90 % | HEART RATE: 70 BPM | RESPIRATION RATE: 16 BRPM | DIASTOLIC BLOOD PRESSURE: 76 MMHG | SYSTOLIC BLOOD PRESSURE: 124 MMHG

## 2024-09-19 DIAGNOSIS — D69.6 THROMBOCYTOPENIA (HCC): ICD-10-CM

## 2024-09-19 DIAGNOSIS — D61.818 PANCYTOPENIA (HCC): Primary | ICD-10-CM

## 2024-09-19 DIAGNOSIS — D53.9 MACROCYTIC ANEMIA: ICD-10-CM

## 2024-09-19 DIAGNOSIS — I26.09 ACUTE PULMONARY EMBOLISM WITH ACUTE COR PULMONALE, UNSPECIFIED PULMONARY EMBOLISM TYPE (HCC): ICD-10-CM

## 2024-09-19 DIAGNOSIS — D61.818 PANCYTOPENIA (HCC): ICD-10-CM

## 2024-09-19 LAB
BLOOD BANK CMNT PATIENT-IMP: NORMAL
DAT POLY-SP REAG RBC QL: NORMAL

## 2024-09-19 PROCEDURE — 1123F ACP DISCUSS/DSCN MKR DOCD: CPT | Performed by: INTERNAL MEDICINE

## 2024-09-19 PROCEDURE — 1036F TOBACCO NON-USER: CPT | Performed by: INTERNAL MEDICINE

## 2024-09-19 PROCEDURE — 99204 OFFICE O/P NEW MOD 45 MIN: CPT | Performed by: INTERNAL MEDICINE

## 2024-09-19 PROCEDURE — 3017F COLORECTAL CA SCREEN DOC REV: CPT | Performed by: INTERNAL MEDICINE

## 2024-09-19 PROCEDURE — 3078F DIAST BP <80 MM HG: CPT | Performed by: INTERNAL MEDICINE

## 2024-09-19 PROCEDURE — G8419 CALC BMI OUT NRM PARAM NOF/U: HCPCS | Performed by: INTERNAL MEDICINE

## 2024-09-19 PROCEDURE — G8427 DOCREV CUR MEDS BY ELIG CLIN: HCPCS | Performed by: INTERNAL MEDICINE

## 2024-09-19 PROCEDURE — 3074F SYST BP LT 130 MM HG: CPT | Performed by: INTERNAL MEDICINE

## 2024-09-19 ASSESSMENT — PATIENT HEALTH QUESTIONNAIRE - PHQ9
SUM OF ALL RESPONSES TO PHQ QUESTIONS 1-9: 0
SUM OF ALL RESPONSES TO PHQ QUESTIONS 1-9: 0
2. FEELING DOWN, DEPRESSED OR HOPELESS: NOT AT ALL
SUM OF ALL RESPONSES TO PHQ QUESTIONS 1-9: 0
SUM OF ALL RESPONSES TO PHQ9 QUESTIONS 1 & 2: 0
1. LITTLE INTEREST OR PLEASURE IN DOING THINGS: NOT AT ALL
SUM OF ALL RESPONSES TO PHQ QUESTIONS 1-9: 0

## 2024-09-20 LAB
BASOPHILS # BLD: 0 K/UL (ref 0–0.1)
BASOPHILS NFR BLD: 1 % (ref 0–1)
DIFFERENTIAL METHOD BLD: ABNORMAL
EOSINOPHIL # BLD: 0.1 K/UL (ref 0–0.4)
EOSINOPHIL NFR BLD: 4 % (ref 0–7)
ERYTHROCYTE [DISTWIDTH] IN BLOOD BY AUTOMATED COUNT: 15.4 % (ref 11.5–14.5)
FERRITIN SERPL-MCNC: 1080 NG/ML (ref 26–388)
FOLATE SERPL-MCNC: 2.6 NG/ML (ref 5–21)
HAPTOGLOB SERPL-MCNC: 99 MG/DL (ref 30–200)
HCT VFR BLD AUTO: 29.6 % (ref 36.6–50.3)
HGB BLD-MCNC: 10.3 G/DL (ref 12.1–17)
IMM GRANULOCYTES # BLD AUTO: 0 K/UL (ref 0–0.04)
IMM GRANULOCYTES NFR BLD AUTO: 0 % (ref 0–0.5)
IRON SATN MFR SERPL: 74 % (ref 20–50)
IRON SERPL-MCNC: 158 UG/DL (ref 35–150)
LDH SERPL L TO P-CCNC: 273 U/L (ref 85–241)
LYMPHOCYTES # BLD: 0.9 K/UL (ref 0.8–3.5)
LYMPHOCYTES NFR BLD: 26 % (ref 12–49)
MCH RBC QN AUTO: 36.9 PG (ref 26–34)
MCHC RBC AUTO-ENTMCNC: 34.8 G/DL (ref 30–36.5)
MCV RBC AUTO: 106.1 FL (ref 80–99)
MONOCYTES # BLD: 0.4 K/UL (ref 0–1)
MONOCYTES NFR BLD: 12 % (ref 5–13)
NEUTS SEG # BLD: 2.1 K/UL (ref 1.8–8)
NEUTS SEG NFR BLD: 57 % (ref 32–75)
NRBC # BLD: 0 K/UL (ref 0–0.01)
NRBC BLD-RTO: 0 PER 100 WBC
PERIPHERAL SMEAR, MD REVIEW: NORMAL
PLATELET # BLD AUTO: 165 K/UL (ref 150–400)
PMV BLD AUTO: 11.3 FL (ref 8.9–12.9)
RBC # BLD AUTO: 2.79 M/UL (ref 4.1–5.7)
TIBC SERPL-MCNC: 213 UG/DL (ref 250–450)
VIT B12 SERPL-MCNC: 203 PG/ML (ref 193–986)
WBC # BLD AUTO: 3.6 K/UL (ref 4.1–11.1)

## 2024-09-24 LAB
ALBUMIN SERPL ELPH-MCNC: 3.4 G/DL (ref 2.9–4.4)
ALBUMIN/GLOB SERPL: 0.9 (ref 0.7–1.7)
ALPHA1 GLOB SERPL ELPH-MCNC: 0.3 G/DL (ref 0–0.4)
ALPHA2 GLOB SERPL ELPH-MCNC: 0.6 G/DL (ref 0.4–1)
B-GLOBULIN SERPL ELPH-MCNC: 1.3 G/DL (ref 0.7–1.3)
GAMMA GLOB SERPL ELPH-MCNC: 1.7 G/DL (ref 0.4–1.8)
GLOBULIN SER-MCNC: 3.8 G/DL (ref 2.2–3.9)
IGA SERPL-MCNC: 810 MG/DL (ref 61–437)
IGG SERPL-MCNC: 1720 MG/DL (ref 603–1613)
IGM SERPL-MCNC: 51 MG/DL (ref 15–143)
INTERPRETATION SERPL IEP-IMP: ABNORMAL
KAPPA LC FREE SER-MCNC: 57.9 MG/L (ref 3.3–19.4)
KAPPA LC FREE/LAMBDA FREE SER: 1.34 (ref 0.26–1.65)
LAMBDA LC FREE SERPL-MCNC: 43.1 MG/L (ref 5.7–26.3)
M PROTEIN SERPL ELPH-MCNC: ABNORMAL G/DL
PROT SERPL-MCNC: 7.2 G/DL (ref 6–8.5)

## 2024-10-02 ENCOUNTER — TELEPHONE (OUTPATIENT)
Facility: CLINIC | Age: 69
End: 2024-10-02

## 2024-10-02 DIAGNOSIS — I10 ESSENTIAL HYPERTENSION: Primary | ICD-10-CM

## 2024-10-02 RX ORDER — LOSARTAN POTASSIUM 100 MG/1
100 TABLET ORAL DAILY
Qty: 90 TABLET | Refills: 1 | Status: SHIPPED | OUTPATIENT
Start: 2024-10-02

## 2024-10-02 RX ORDER — AMLODIPINE BESYLATE 10 MG/1
10 TABLET ORAL DAILY
Qty: 90 TABLET | Refills: 1 | Status: SHIPPED | OUTPATIENT
Start: 2024-10-02

## 2024-10-02 NOTE — TELEPHONE ENCOUNTER
Pt called to advise he's been unable to get refill on his blood pressure medications since request sent on Sunday and was advised by pharmacist to contact our office.    Per chart, Amlodipine and Losartan were both discontinued while pt was in the hospital.  Pt states his blood pressure was low in the hospital but he resumed taking both meds when he returned home and blood pressure started going up again.    Pt checking BP daily and reading today was 127/70 (on medication), states he has only 2 of each left and asks that refills be sent to [facility] pharmacy today please. Ldm

## 2024-10-02 NOTE — TELEPHONE ENCOUNTER
Called and spoke with pt.   He has been advise and states understanding of BP medications being sent to local pharmacy and will keep 10/17/2024 appt.

## 2024-10-16 SDOH — HEALTH STABILITY: PHYSICAL HEALTH
ON AVERAGE, HOW MANY DAYS PER WEEK DO YOU ENGAGE IN MODERATE TO STRENUOUS EXERCISE (LIKE A BRISK WALK)?: PATIENT DECLINED

## 2024-10-16 ASSESSMENT — PATIENT HEALTH QUESTIONNAIRE - PHQ9
SUM OF ALL RESPONSES TO PHQ QUESTIONS 1-9: 0
SUM OF ALL RESPONSES TO PHQ QUESTIONS 1-9: 0
2. FEELING DOWN, DEPRESSED OR HOPELESS: NOT AT ALL
1. LITTLE INTEREST OR PLEASURE IN DOING THINGS: NOT AT ALL
SUM OF ALL RESPONSES TO PHQ QUESTIONS 1-9: 0
SUM OF ALL RESPONSES TO PHQ QUESTIONS 1-9: 0
SUM OF ALL RESPONSES TO PHQ9 QUESTIONS 1 & 2: 0

## 2024-10-16 ASSESSMENT — LIFESTYLE VARIABLES
HOW OFTEN DO YOU HAVE A DRINK CONTAINING ALCOHOL: 4
HOW OFTEN DO YOU HAVE A DRINK CONTAINING ALCOHOL: 2-3 TIMES A WEEK

## 2024-10-17 ENCOUNTER — OFFICE VISIT (OUTPATIENT)
Facility: CLINIC | Age: 69
End: 2024-10-17

## 2024-10-17 VITALS
SYSTOLIC BLOOD PRESSURE: 134 MMHG | RESPIRATION RATE: 16 BRPM | HEIGHT: 70 IN | WEIGHT: 190 LBS | DIASTOLIC BLOOD PRESSURE: 77 MMHG | OXYGEN SATURATION: 99 % | HEART RATE: 71 BPM | TEMPERATURE: 97.2 F | BODY MASS INDEX: 27.2 KG/M2

## 2024-10-17 DIAGNOSIS — Z71.89 ACP (ADVANCE CARE PLANNING): ICD-10-CM

## 2024-10-17 DIAGNOSIS — M05.79 SEROPOSITIVE RHEUMATOID ARTHRITIS OF MULTIPLE SITES (HCC): ICD-10-CM

## 2024-10-17 DIAGNOSIS — Z86.718 HISTORY OF DVT OF LOWER EXTREMITY: ICD-10-CM

## 2024-10-17 DIAGNOSIS — I10 ESSENTIAL HYPERTENSION: ICD-10-CM

## 2024-10-17 DIAGNOSIS — Z00.00 MEDICARE ANNUAL WELLNESS VISIT, SUBSEQUENT: Primary | ICD-10-CM

## 2024-10-17 PROBLEM — I26.09 ACUTE PULMONARY EMBOLISM WITH ACUTE COR PULMONALE (HCC): Status: RESOLVED | Noted: 2024-07-13 | Resolved: 2024-10-17

## 2024-10-17 PROBLEM — I83.892 VENOUS STASIS ULCER OF LEFT LOWER LEG WITH EDEMA OF LEFT LOWER LEG (HCC): Status: RESOLVED | Noted: 2024-07-13 | Resolved: 2024-10-17

## 2024-10-17 PROBLEM — I82.90 VTE (VENOUS THROMBOEMBOLISM): Status: RESOLVED | Noted: 2024-07-12 | Resolved: 2024-10-17

## 2024-10-17 PROBLEM — R60.0 VENOUS STASIS ULCER OF LEFT LOWER LEG WITH EDEMA OF LEFT LOWER LEG (HCC): Status: RESOLVED | Noted: 2024-07-13 | Resolved: 2024-10-17

## 2024-10-17 PROBLEM — I83.029 VENOUS STASIS ULCER OF LEFT LOWER LEG WITH EDEMA OF LEFT LOWER LEG (HCC): Status: RESOLVED | Noted: 2024-07-13 | Resolved: 2024-10-17

## 2024-10-17 PROBLEM — L03.116 CELLULITIS OF LEFT LEG: Status: RESOLVED | Noted: 2024-07-13 | Resolved: 2024-10-17

## 2024-10-17 PROBLEM — L97.929 VENOUS STASIS ULCER OF LEFT LOWER LEG WITH EDEMA OF LEFT LOWER LEG (HCC): Status: RESOLVED | Noted: 2024-07-13 | Resolved: 2024-10-17

## 2024-10-17 SDOH — ECONOMIC STABILITY: FOOD INSECURITY: WITHIN THE PAST 12 MONTHS, THE FOOD YOU BOUGHT JUST DIDN'T LAST AND YOU DIDN'T HAVE MONEY TO GET MORE.: NEVER TRUE

## 2024-10-17 SDOH — ECONOMIC STABILITY: FOOD INSECURITY: WITHIN THE PAST 12 MONTHS, YOU WORRIED THAT YOUR FOOD WOULD RUN OUT BEFORE YOU GOT MONEY TO BUY MORE.: NEVER TRUE

## 2024-10-17 SDOH — ECONOMIC STABILITY: INCOME INSECURITY: HOW HARD IS IT FOR YOU TO PAY FOR THE VERY BASICS LIKE FOOD, HOUSING, MEDICAL CARE, AND HEATING?: NOT HARD AT ALL

## 2024-10-17 ASSESSMENT — LIFESTYLE VARIABLES: HOW OFTEN DO YOU HAVE A DRINK CONTAINING ALCOHOL: 2-3 TIMES A WEEK

## 2024-10-17 NOTE — PROGRESS NOTES
eyesight?: No  Have you had an eye exam within the past year?: (!) No  Interventions:   Patient encouraged to make appointment with their eye specialist      Advanced Directives:  Do you have a Living Will?: (!) No  Intervention:  has NO advanced directive - information provided                 Objective   Vitals:    10/17/24 1045   BP: 134/77   Pulse: 71   Resp: 16   Temp: 97.2 °F (36.2 °C)   SpO2: 99%   Weight: 86.2 kg (190 lb)   Height: 1.778 m (5' 10\")      Body mass index is 27.26 kg/m².        Physical Exam  Constitutional: well-appearing, no acute distress  Eyes: EOM intact. PERRL bilateral. Not icteric.  Cardiac: normal rate, regular rhythm. Normal heart sounds.  Pulm: normal rate, normal effort.  Skin: normal color and turgor.  Lower extrem: no edema.               Allergies   Allergen Reactions    Codeine Other (See Comments)     Talking out of head     Prior to Visit Medications    Medication Sig Taking? Authorizing Provider   losartan (COZAAR) 100 MG tablet Take 1 tablet by mouth daily Yes Ana Blum APRN - CNP   amLODIPine (NORVASC) 10 MG tablet Take 1 tablet by mouth daily Yes Ana Blum APRN - CNP   methotrexate (RHEUMATREX) 2.5 MG chemo tablet Take 6 tablets by mouth once a week Yes Park Epperson MD   apixaban (ELIQUIS) 5 MG TABS tablet Take 2 tablets by mouth 2 times daily for 12 doses Yes Shirley Rausch MD   apixaban (ELIQUIS) 5 MG TABS tablet Take 1 tablet by mouth 2 times daily  Shirley Rausch MD   augmented betamethasone dipropionate (DIPROLENE-AF) 0.05 % ointment Apply topically 2 times daily as needed  Automatic Reconciliation, Ar   predniSONE (DELTASONE) 20 MG tablet Take 1 tablet by mouth daily as needed  Automatic Reconciliation, Ar       CareTeam (Including outside providers/suppliers regularly involved in providing care):   Patient Care Team:  Ochoa Wisdom MD as PCP - General (Family Medicine)  Ochoa Wisdom MD as PCP - Empaneled Provider      Reviewed and updated this

## 2024-10-18 ENCOUNTER — CLINICAL DOCUMENTATION (OUTPATIENT)
Dept: SPIRITUAL SERVICES | Age: 69
End: 2024-10-18

## 2024-10-18 NOTE — ACP (ADVANCE CARE PLANNING)
10/21/2024 at 8:00 AM.  A copy of [facility] and ACP information sheets sent to patient's confirmed email address on file with ACP Specialist and Coordinator's contact information included.         Thank you for this referral.

## 2024-10-21 ENCOUNTER — CLINICAL DOCUMENTATION (OUTPATIENT)
Dept: CASE MANAGEMENT | Age: 69
End: 2024-10-21

## 2024-10-21 NOTE — ACP (ADVANCE CARE PLANNING)
Advance Care Planning     Advance Care Planning Clinical Specialist  Conversation Note      Date of ACP Conversation: 10/21/2024    Conversation Conducted with: Patient with Decision Making Capacity    ACP Clinical Specialist: Tammy Nayak LCSW    Healthcare Decision Maker:     Current Designated Healthcare Decision Maker:     Primary Decision Maker: Destiny Kaur - Child - [phone]    Secondary Decision Maker: Rowan Kaur - Parent - [phone]    Care Preferences    Ventilation:  \"If you were in your present state of health and suddenly became very ill and were unable to breathe on your own, what would your preference be about the use of a ventilator (breathing machine) if it were available to you?\"      If the patient would desire the use of ventilator (breathing machine), answer \"yes\".  If not, \"no\":  Health Care preferences were not decided by patient during this appointment.    \"If your health worsens and it becomes clear that your chance of recovery is unlikely, what would your preference be about the use of a ventilator (breathing machine) if it were available to you?\"     Would the patient desire the use of ventilator (breathing machine)?: Health Care preferences were not decided by patient during this appointment.      Resuscitation  \"CPR works best to restart the heart when there is a sudden event, like a heart attack, in someone who is otherwise healthy. Unfortunately, CPR does not typically restart the heart for people who have serious health conditions or who are very sick.\"    \"In the event your heart stopped as a result of an underlying serious health condition, would you want attempts to be made to restart your heart (answer \"yes\" for attempt to resuscitate) or would you prefer a natural death (answer \"no\" for do not attempt to resuscitate)?\" Health Care preferences were not decided by patient during this appointment.       [x] Yes   [] No   Educated Patient / Decision Maker

## 2024-12-01 PROBLEM — D68.59 HYPERCOAGULABLE STATE (HCC): Status: ACTIVE | Noted: 2024-12-01

## 2024-12-01 PROBLEM — Z79.899 HIGH RISK MEDICATION USE: Status: ACTIVE | Noted: 2024-12-01

## 2024-12-01 PROBLEM — D53.9 MACROCYTIC ANEMIA: Status: ACTIVE | Noted: 2024-12-01

## 2024-12-02 ENCOUNTER — OFFICE VISIT (OUTPATIENT)
Age: 69
End: 2024-12-02
Payer: MEDICARE

## 2024-12-02 VITALS
SYSTOLIC BLOOD PRESSURE: 137 MMHG | OXYGEN SATURATION: 96 % | HEART RATE: 73 BPM | DIASTOLIC BLOOD PRESSURE: 76 MMHG | WEIGHT: 196 LBS | HEIGHT: 70 IN | BODY MASS INDEX: 28.06 KG/M2

## 2024-12-02 DIAGNOSIS — Z79.899 HIGH RISK MEDICATION USE: ICD-10-CM

## 2024-12-02 DIAGNOSIS — D53.9 MACROCYTIC ANEMIA: ICD-10-CM

## 2024-12-02 DIAGNOSIS — D68.59 HYPERCOAGULABLE STATE (HCC): Primary | ICD-10-CM

## 2024-12-02 DIAGNOSIS — Z86.718 HISTORY OF DVT OF LOWER EXTREMITY: ICD-10-CM

## 2024-12-02 DIAGNOSIS — D68.59 HYPERCOAGULABLE STATE (HCC): ICD-10-CM

## 2024-12-02 PROCEDURE — 1123F ACP DISCUSS/DSCN MKR DOCD: CPT | Performed by: INTERNAL MEDICINE

## 2024-12-02 PROCEDURE — 99214 OFFICE O/P EST MOD 30 MIN: CPT | Performed by: INTERNAL MEDICINE

## 2024-12-02 PROCEDURE — 3017F COLORECTAL CA SCREEN DOC REV: CPT | Performed by: INTERNAL MEDICINE

## 2024-12-02 PROCEDURE — 1036F TOBACCO NON-USER: CPT | Performed by: INTERNAL MEDICINE

## 2024-12-02 PROCEDURE — 3078F DIAST BP <80 MM HG: CPT | Performed by: INTERNAL MEDICINE

## 2024-12-02 PROCEDURE — 1159F MED LIST DOCD IN RCRD: CPT | Performed by: INTERNAL MEDICINE

## 2024-12-02 PROCEDURE — G8484 FLU IMMUNIZE NO ADMIN: HCPCS | Performed by: INTERNAL MEDICINE

## 2024-12-02 PROCEDURE — G8427 DOCREV CUR MEDS BY ELIG CLIN: HCPCS | Performed by: INTERNAL MEDICINE

## 2024-12-02 PROCEDURE — 3075F SYST BP GE 130 - 139MM HG: CPT | Performed by: INTERNAL MEDICINE

## 2024-12-02 PROCEDURE — G8419 CALC BMI OUT NRM PARAM NOF/U: HCPCS | Performed by: INTERNAL MEDICINE

## 2024-12-02 NOTE — PROGRESS NOTES
Patient identified by name and date of birth  Joe Kaur is a 69 y.o. male   Chief Complaint   Patient presents with    Follow-up     Pancytopenia (HCC)        Vitals:    12/02/24 0832   BP: 137/76   Site: Left Upper Arm   Pulse: 73   SpO2: 96%   Weight: 88.9 kg (196 lb)   Height: 1.778 m (5' 10\")       No LMP for male patient.         \"Have you been to the ER, urgent care clinic since your last visit?  Hospitalized since your last visit?\"    NO    “Have you seen or consulted any other health care providers outside of [facility] since your last visit?”    NO

## 2024-12-02 NOTE — PROGRESS NOTES
Cancer [place] at [facility]  [address], [address] [facility] [address]  W: [phone]  F: [phone] Patient ID  Name: Joe Kaur  YOB: 1955  MRN: [id number]  Referring Provider:   No referring provider defined for this encounter.  Primary Care Provider:   Ochoa Wisdom MD       HEMATOLOGY/MEDICAL ONCOLOGY  NOTE     Reason for Evaluation:     Subjective:     History of Present Illness:   Date of Visit: 12/2/2024  Joe Kaur is a 69 y.o. male who presents for a follow-up evaluation for HYPERCOAGULABLE STATE.   History of Present Illness  The patient presents for evaluation of multiple medical concerns.    He reports no instances of shortness of breath or bleeding events. He is currently on Eliquis, a blood thinner, and is seeking a more cost-effective alternative.    He has a history of anemia, which has shown improvement. His B12 levels were previously on the lower end of the normal range.    Supplemental Information  He has rheumatoid arthritis.    FAMILY HISTORY  He denies any family history of lupus.        -----  Past Medical History:   Diagnosis Date    Acute pulmonary embolism with acute cor pulmonale (HCC) 07/13/2024    BPH (benign prostatic hypertrophy) 11/19/2012    Cellulitis of left leg 07/13/2024    2/2 venous ulcer of the L leg from DVT.     Medication: Amoxicillin 500mg TID x 10 days (started 07/14/2024)  - patient thinks maybe a little better looking, no really sure. Needs me to look at it.      Eczema 11/19/2012    Elevated cholesterol 11/19/2012    GERD (gastroesophageal reflux disease) 11/11/2016    History of renal cell cancer 11/19/2012    Clear cell    HTN (hypertension) 11/19/2012    Iritis 11/19/2012    RA (rheumatoid arthritis) (HCC) 11/19/2012    Renal cell cancer (HCC) 11/19/2012    Venous stasis ulcer of left lower leg with edema of left lower leg (HCC) 07/13/2024    2/2 acute DVT second time. Led to cellulitis.

## 2024-12-03 PROBLEM — D47.2 MGUS (MONOCLONAL GAMMOPATHY OF UNKNOWN SIGNIFICANCE): Status: ACTIVE | Noted: 2024-12-03

## 2024-12-03 LAB
ALBUMIN SERPL-MCNC: 3 G/DL (ref 3.5–5)
ALBUMIN/GLOB SERPL: 0.7 (ref 1.1–2.2)
ALP SERPL-CCNC: 88 U/L (ref 45–117)
ALT SERPL-CCNC: 24 U/L (ref 12–78)
ANION GAP SERPL CALC-SCNC: 6 MMOL/L (ref 2–12)
AST SERPL-CCNC: 23 U/L (ref 15–37)
BASOPHILS # BLD: 0 K/UL (ref 0–0.1)
BASOPHILS NFR BLD: 1 % (ref 0–1)
BILIRUB SERPL-MCNC: 0.4 MG/DL (ref 0.2–1)
BUN SERPL-MCNC: 11 MG/DL (ref 6–20)
BUN/CREAT SERPL: 9 (ref 12–20)
CALCIUM SERPL-MCNC: 8.4 MG/DL (ref 8.5–10.1)
CHLORIDE SERPL-SCNC: 111 MMOL/L (ref 97–108)
CO2 SERPL-SCNC: 26 MMOL/L (ref 21–32)
CREAT SERPL-MCNC: 1.18 MG/DL (ref 0.7–1.3)
DIFFERENTIAL METHOD BLD: ABNORMAL
EOSINOPHIL # BLD: 0.4 K/UL (ref 0–0.4)
EOSINOPHIL NFR BLD: 7 % (ref 0–7)
ERYTHROCYTE [DISTWIDTH] IN BLOOD BY AUTOMATED COUNT: 14.4 % (ref 11.5–14.5)
GLOBULIN SER CALC-MCNC: 4.2 G/DL (ref 2–4)
GLUCOSE SERPL-MCNC: 101 MG/DL (ref 65–100)
HCT VFR BLD AUTO: 31.4 % (ref 36.6–50.3)
HGB BLD-MCNC: 10.7 G/DL (ref 12.1–17)
IMM GRANULOCYTES # BLD AUTO: 0 K/UL (ref 0–0.04)
IMM GRANULOCYTES NFR BLD AUTO: 1 % (ref 0–0.5)
LYMPHOCYTES # BLD: 1.3 K/UL (ref 0.8–3.5)
LYMPHOCYTES NFR BLD: 23 % (ref 12–49)
MCH RBC QN AUTO: 36 PG (ref 26–34)
MCHC RBC AUTO-ENTMCNC: 34.1 G/DL (ref 30–36.5)
MCV RBC AUTO: 105.7 FL (ref 80–99)
MONOCYTES # BLD: 0.5 K/UL (ref 0–1)
MONOCYTES NFR BLD: 9 % (ref 5–13)
NEUTS SEG # BLD: 3.2 K/UL (ref 1.8–8)
NEUTS SEG NFR BLD: 59 % (ref 32–75)
NRBC # BLD: 0 K/UL (ref 0–0.01)
NRBC BLD-RTO: 0 PER 100 WBC
PLATELET # BLD AUTO: 149 K/UL (ref 150–400)
PMV BLD AUTO: 10.8 FL (ref 8.9–12.9)
POTASSIUM SERPL-SCNC: 4 MMOL/L (ref 3.5–5.1)
PROT SERPL-MCNC: 7.2 G/DL (ref 6.4–8.2)
RBC # BLD AUTO: 2.97 M/UL (ref 4.1–5.7)
SODIUM SERPL-SCNC: 143 MMOL/L (ref 136–145)
VIT B12 SERPL-MCNC: 229 PG/ML (ref 193–986)
WBC # BLD AUTO: 5.4 K/UL (ref 4.1–11.1)

## 2024-12-03 RX ORDER — FOLIC ACID 1 MG/1
1 TABLET ORAL DAILY
Qty: 90 TABLET | Refills: 1 | Status: SHIPPED | OUTPATIENT
Start: 2024-12-03

## 2024-12-12 LAB
APTT HEX PL PPP: 3 SEC
APTT IMM NP PPP: ABNORMAL SEC
APTT PPP 1:1 SALINE: ABNORMAL SEC
APTT PPP: 25 SEC
B2 GLYCOPROT1 IGA SER-ACNC: <10 SAU
B2 GLYCOPROT1 IGG SER-ACNC: <10 SGU
B2 GLYCOPROT1 IGM SER-ACNC: <10 SMU
CARDIOLIPIN IGG SER IA-ACNC: <10 GPL
CARDIOLIPIN IGM SER IA-ACNC: <10 MPL
CONFIRM APTT: 1 SEC
CONFIRM DRVVT: ABNORMAL SEC
INR PPP: 1.1 RATIO
LABORATORY COMMENT REPORT: ABNORMAL
PROTHROMBIN TIME: 12.4 SEC
SCREEN DRVVT/NORMAL: ABNORMAL RATIO
SCREEN DRVVT: 39.3 SEC
THROMBIN TIME: 18.1 SEC

## 2024-12-19 ENCOUNTER — TELEPHONE (OUTPATIENT)
Age: 69
End: 2024-12-19

## 2024-12-19 NOTE — TELEPHONE ENCOUNTER
12/19/24- Sent patient Arke[employer] message in regards to Eliquis but it was unread.     Called patient to inform of the financial assistance process for Eliquis but was discovered patient isn't on any other expensive medications so likely patient won't qualify. Patient was appreciative of the call and had no further questions or concerns.

## 2025-02-04 ENCOUNTER — OFFICE VISIT (OUTPATIENT)
Facility: CLINIC | Age: 70
End: 2025-02-04
Payer: MEDICARE

## 2025-02-04 VITALS
TEMPERATURE: 97.9 F | HEART RATE: 55 BPM | BODY MASS INDEX: 28.06 KG/M2 | SYSTOLIC BLOOD PRESSURE: 110 MMHG | HEIGHT: 70 IN | WEIGHT: 196 LBS | DIASTOLIC BLOOD PRESSURE: 73 MMHG | OXYGEN SATURATION: 99 %

## 2025-02-04 DIAGNOSIS — R53.83 OTHER FATIGUE: Primary | ICD-10-CM

## 2025-02-04 PROCEDURE — 1123F ACP DISCUSS/DSCN MKR DOCD: CPT

## 2025-02-04 PROCEDURE — 3017F COLORECTAL CA SCREEN DOC REV: CPT

## 2025-02-04 PROCEDURE — 1036F TOBACCO NON-USER: CPT

## 2025-02-04 PROCEDURE — G8419 CALC BMI OUT NRM PARAM NOF/U: HCPCS

## 2025-02-04 PROCEDURE — 1126F AMNT PAIN NOTED NONE PRSNT: CPT

## 2025-02-04 PROCEDURE — 3078F DIAST BP <80 MM HG: CPT

## 2025-02-04 PROCEDURE — 1159F MED LIST DOCD IN RCRD: CPT

## 2025-02-04 PROCEDURE — G8427 DOCREV CUR MEDS BY ELIG CLIN: HCPCS

## 2025-02-04 PROCEDURE — 99213 OFFICE O/P EST LOW 20 MIN: CPT

## 2025-02-04 PROCEDURE — 3074F SYST BP LT 130 MM HG: CPT

## 2025-02-04 RX ORDER — METHOTREXATE 2.5 MG/1
TABLET ORAL
COMMUNITY
Start: 2024-11-21

## 2025-02-04 SDOH — ECONOMIC STABILITY: FOOD INSECURITY: WITHIN THE PAST 12 MONTHS, THE FOOD YOU BOUGHT JUST DIDN'T LAST AND YOU DIDN'T HAVE MONEY TO GET MORE.: NEVER TRUE

## 2025-02-04 SDOH — ECONOMIC STABILITY: FOOD INSECURITY: WITHIN THE PAST 12 MONTHS, YOU WORRIED THAT YOUR FOOD WOULD RUN OUT BEFORE YOU GOT MONEY TO BUY MORE.: NEVER TRUE

## 2025-02-04 ASSESSMENT — PATIENT HEALTH QUESTIONNAIRE - PHQ9
SUM OF ALL RESPONSES TO PHQ QUESTIONS 1-9: 0
SUM OF ALL RESPONSES TO PHQ QUESTIONS 1-9: 0
1. LITTLE INTEREST OR PLEASURE IN DOING THINGS: NOT AT ALL
SUM OF ALL RESPONSES TO PHQ QUESTIONS 1-9: 0
SUM OF ALL RESPONSES TO PHQ9 QUESTIONS 1 & 2: 0
SUM OF ALL RESPONSES TO PHQ QUESTIONS 1-9: 0
2. FEELING DOWN, DEPRESSED OR HOPELESS: NOT AT ALL

## 2025-02-04 NOTE — PROGRESS NOTES
Chief Complaint   Patient presents with    Fatigue    Sweats     \"Have you been to the ER, urgent care clinic since your last visit?  Hospitalized since your last visit?\"    NO    “Have you seen or consulted any other health care providers outside of [facility] since your last visit?”    NO        “Have you had a colorectal cancer screening such as a colonoscopy/FIT/Cologuard?    NO    No colonoscopy on file  No cologuard on file  No FIT/FOBT on file   No flexible sigmoidoscopy on file         Click Here for Release of Records Request  
pause heparin
88  45 - 117 U/L Final    Total Protein 12/02/2024 7.2  6.4 - 8.2 g/dL Final    Albumin 12/02/2024 3.0 (L)  3.5 - 5.0 g/dL Final    Globulin 12/02/2024 4.2 (H)  2.0 - 4.0 g/dL Final    Albumin/Globulin Ratio 12/02/2024 0.7 (L)  1.1 - 2.2   Final    WBC 12/02/2024 5.4  4.1 - 11.1 K/uL Final    RBC 12/02/2024 2.97 (L)  4.10 - 5.70 M/uL Final    Hemoglobin 12/02/2024 10.7 (L)  12.1 - 17.0 g/dL Final    Hematocrit 12/02/2024 31.4 (L)  36.6 - 50.3 % Final    MCV 12/02/2024 105.7 (H)  80.0 - 99.0 FL Final    MCH 12/02/2024 36.0 (H)  26.0 - 34.0 PG Final    MCHC 12/02/2024 34.1  30.0 - 36.5 g/dL Final    RDW 12/02/2024 14.4  11.5 - 14.5 % Final    Platelets 12/02/2024 149 (L)  150 - 400 K/uL Final    MPV 12/02/2024 10.8  8.9 - 12.9 FL Final    Nucleated RBCs 12/02/2024 0.0  0  WBC Final    nRBC 12/02/2024 0.00  0.00 - 0.01 K/uL Final    Neutrophils % 12/02/2024 59  32 - 75 % Final    Lymphocytes % 12/02/2024 23  12 - 49 % Final    Monocytes % 12/02/2024 9  5 - 13 % Final    Eosinophils % 12/02/2024 7  0 - 7 % Final    Basophils % 12/02/2024 1  0 - 1 % Final    Immature Granulocytes % 12/02/2024 1 (H)  0.0 - 0.5 % Final    Neutrophils Absolute 12/02/2024 3.2  1.8 - 8.0 K/UL Final    Lymphocytes Absolute 12/02/2024 1.3  0.8 - 3.5 K/UL Final    Monocytes Absolute 12/02/2024 0.5  0.0 - 1.0 K/UL Final    Eosinophils Absolute 12/02/2024 0.4  0.0 - 0.4 K/UL Final    Basophils Absolute 12/02/2024 0.0  0.0 - 0.1 K/UL Final    Immature Granulocytes Absolute 12/02/2024 0.0  0.00 - 0.04 K/UL Final    Differential Type 12/02/2024 AUTOMATED    Final       OBJECTIVE  /73 (Site: Left Upper Arm, Position: Sitting, Cuff Size: Medium Adult)   Pulse 55   Temp 97.9 °F (36.6 °C) (Temporal)   Ht 1.778 m (5' 10\")   Wt 88.9 kg (196 lb)   SpO2 99%   BMI 28.12 kg/m²     Physical Exam  Vitals and nursing note reviewed.   Constitutional:       Appearance: Normal appearance.   HENT:      Head: Normocephalic and atraumatic.      Right

## 2025-02-13 ENCOUNTER — TELEPHONE (OUTPATIENT)
Dept: PHARMACY | Facility: CLINIC | Age: 70
End: 2025-02-13

## 2025-02-13 NOTE — TELEPHONE ENCOUNTER
CLINICAL PHARMACY NOTE - [facility] Pharmacy Referral    Patient can be scheduled with:  Team Schedule- General Referrals, etc.    Received a referral from: Care Coordinator to discuss patient’s medications. Called patient to schedule a time to speak with a pharmacist over the telephone.   Spoke to patient and advised them of the above message.  Patient verified understanding and scheduled their appointment: 2/17/25 at 9AM      Princess Thorne CPhT.   [facility] Clinical   [facility]  Toll free: [phone] Option 1    Patient is located in [place].  Please schedule Monday-Thursday 8AM-4PM with Bree Haile or Jessica for licensing purposes.        For Pharmacy Admin Tracking Only    Program: [employer]  CPA in place:  No  Recommendation Provided To: Patient/Caregiver: 1 via Telephone  Intervention Detail: Scheduled Appointment  Intervention Accepted By: Patient/Caregiver: 1  Gap Closed?: Yes   Time Spent (min): 5

## 2025-02-13 NOTE — TELEPHONE ENCOUNTER
----- Message from Janeen SALCIDO sent at 2/13/2025 10:34 AM EST -----  Regarding: Medication pricing  Good morning,     This patient was referred to me d/t Eliquis pricing.  His copayment was $500 and he asked his PCP about switching to another med.  Per Dr Wisdom he would prefer him to stay on the Eliquis, but would be open to Xarelto as a 2nd choice, and ultimately Warfarin if necessary.    I spoke with that patient who was open to a pharmacy review to see which option would be best.  Thanks for any help.    Janeen Carolina RN, [facility] [phone]

## 2025-02-17 ENCOUNTER — TELEPHONE (OUTPATIENT)
Dept: PHARMACY | Facility: CLINIC | Age: 70
End: 2025-02-17

## 2025-02-17 NOTE — TELEPHONE ENCOUNTER
assistance for 2025, please attach 2025 out-of-pocket prescription expenses to your application]    Medication Optimization  Any medications that have a lower cost alternative? - as noted, provider preference is Eliquis. Xarelto Patient Assistance eligibility similar to Eliquis, except You spend more than 4% of your gross annual household income on prescription drugs. Warfarin possible, per Medicare plan compare of his plan, appears lab costs $0-50, so unclear what INR/lab would cost.      Reviewed rx deductible with patient and estimated costs. States can afford current cost (wishes it wasn't, but can make it work, especially knowing the cost then drops). He may also consider [employer] mail order for slight cost savings.  [employer] info/summary of the info from medicare.gov re his [employer] plan.    Lazara Mccarty, PharmD, [facility]  Population Health Pharmacist  [facility]acy  Department, toll free: [phone], option 1    =======================================================   For Pharmacy Admin Tracking Only    Program: [email or web address]  CPA in place:  No  Recommendation Provided To: Patient/Caregiver: 1 via Telephone  Intervention Detail: Benefit Assistance  Intervention Accepted By: Patient/Caregiver: 1  Gap Closed?: Yes   Time Spent (min):  25

## 2025-03-10 ENCOUNTER — OFFICE VISIT (OUTPATIENT)
Age: 70
End: 2025-03-10
Payer: MEDICARE

## 2025-03-10 VITALS
SYSTOLIC BLOOD PRESSURE: 150 MMHG | DIASTOLIC BLOOD PRESSURE: 88 MMHG | TEMPERATURE: 97.9 F | HEART RATE: 70 BPM | RESPIRATION RATE: 16 BRPM | BODY MASS INDEX: 27.84 KG/M2 | WEIGHT: 194 LBS | OXYGEN SATURATION: 97 %

## 2025-03-10 DIAGNOSIS — M05.9 SEROPOSITIVE RHEUMATOID ARTHRITIS (HCC): Primary | ICD-10-CM

## 2025-03-10 DIAGNOSIS — Z51.81 MEDICATION MONITORING ENCOUNTER: ICD-10-CM

## 2025-03-10 DIAGNOSIS — Z01.89 ENCOUNTER FOR OTHER SPECIFIED SPECIAL EXAMINATIONS: ICD-10-CM

## 2025-03-10 PROCEDURE — 3017F COLORECTAL CA SCREEN DOC REV: CPT | Performed by: NURSE PRACTITIONER

## 2025-03-10 PROCEDURE — 1159F MED LIST DOCD IN RCRD: CPT | Performed by: NURSE PRACTITIONER

## 2025-03-10 PROCEDURE — G8427 DOCREV CUR MEDS BY ELIG CLIN: HCPCS | Performed by: NURSE PRACTITIONER

## 2025-03-10 PROCEDURE — 1036F TOBACCO NON-USER: CPT | Performed by: NURSE PRACTITIONER

## 2025-03-10 PROCEDURE — 1123F ACP DISCUSS/DSCN MKR DOCD: CPT | Performed by: NURSE PRACTITIONER

## 2025-03-10 PROCEDURE — 3079F DIAST BP 80-89 MM HG: CPT | Performed by: NURSE PRACTITIONER

## 2025-03-10 PROCEDURE — 3077F SYST BP >= 140 MM HG: CPT | Performed by: NURSE PRACTITIONER

## 2025-03-10 PROCEDURE — G8419 CALC BMI OUT NRM PARAM NOF/U: HCPCS | Performed by: NURSE PRACTITIONER

## 2025-03-10 PROCEDURE — 99214 OFFICE O/P EST MOD 30 MIN: CPT | Performed by: NURSE PRACTITIONER

## 2025-03-10 PROCEDURE — 1125F AMNT PAIN NOTED PAIN PRSNT: CPT | Performed by: NURSE PRACTITIONER

## 2025-03-10 PROCEDURE — 1160F RVW MEDS BY RX/DR IN RCRD: CPT | Performed by: NURSE PRACTITIONER

## 2025-03-10 RX ORDER — PREDNISONE 5 MG/1
TABLET ORAL
Qty: 30 TABLET | Refills: 0 | Status: SHIPPED | OUTPATIENT
Start: 2025-03-10

## 2025-03-10 ASSESSMENT — PATIENT HEALTH QUESTIONNAIRE - PHQ9
SUM OF ALL RESPONSES TO PHQ QUESTIONS 1-9: 0
SUM OF ALL RESPONSES TO PHQ QUESTIONS 1-9: 0
1. LITTLE INTEREST OR PLEASURE IN DOING THINGS: NOT AT ALL
SUM OF ALL RESPONSES TO PHQ QUESTIONS 1-9: 0
SUM OF ALL RESPONSES TO PHQ QUESTIONS 1-9: 0
2. FEELING DOWN, DEPRESSED OR HOPELESS: NOT AT ALL

## 2025-03-10 ASSESSMENT — ENCOUNTER SYMPTOMS
TROUBLE SWALLOWING: 0
SORE THROAT: 0
EYE REDNESS: 0
SHORTNESS OF BREATH: 0
DIARRHEA: 0
EYE PAIN: 0
COLOR CHANGE: 0
BLOOD IN STOOL: 0
ABDOMINAL PAIN: 0
NAUSEA: 0
VOMITING: 0
CONSTIPATION: 0
COUGH: 0

## 2025-03-10 NOTE — PATIENT INSTRUCTIONS
Thank you for visiting [facility]!      For future medication refills, we require updated lab results and an upcoming appointment to be in our system to refill prescriptions.  Without these two things, your refill will be DENIED.  If you miss your upcoming appointment, your refill will also be DENIED.      We appreciate your understanding of this critical clinical aspect of our practice. - BLACK Felder

## 2025-03-10 NOTE — PROGRESS NOTES
Chief Complaint   Patient presents with    Joint Pain     1. Have you been to the ER, urgent care clinic since your last visit?  Hospitalized since your last visit?No    2. Have you seen or consulted any other health care providers outside of the [facility] since your last visit?  Include any pap smears or colon screening. No

## 2025-03-10 NOTE — PROGRESS NOTES
Joe Kaur (:  1955) is a 69 y.o. male    2020 CCP >250,     Subjective   The patient is a 69 year old male here for a follow up visit for rheumatoid arthritis. His last visit was 2024 with Dr. Epperson. He is currently taking Methotrexate 15mg once a week. He denies joint pain and swelling. He denies morning stiffness. He also has a history of iritis and he denies flares of eye pain or redness. He plays tennis four times a week for exercise when the weather is warm. He asks for a Prednisone taper so that he has one but he states he has not needed one in a while. He denies infections or antibiotics since his last visit.     Review of Systems   Constitutional:  Negative for chills and fever.   HENT:  Negative for mouth sores, sore throat and trouble swallowing.         Denies nasals sores  Denies dry mouth   Eyes:  Negative for pain and redness.        Denies dry eyes   Respiratory:  Negative for cough and shortness of breath.    Cardiovascular:  Negative for chest pain.   Gastrointestinal:  Negative for abdominal pain, blood in stool, constipation, diarrhea, nausea and vomiting.   Genitourinary:  Negative for dysuria and hematuria.   Musculoskeletal:  Negative for arthralgias and joint swelling.   Skin:  Negative for color change and rash.     Current Outpatient Medications   Medication Sig Dispense Refill    methotrexate (RHEUMATREX) 2.5 MG chemo tablet TAKE 6 TABLETS BY MOUTH ONE TIME PER WEEK      folic acid (FOLVITE) 1 MG tablet Take 1 tablet by mouth daily 90 tablet 1    Cyanocobalamin 1000 MCG SUBL Place 1,000 mcg under the tongue daily 90 tablet 1    apixaban (ELIQUIS) 5 MG TABS tablet Take 1 tablet by mouth 2 times daily 180 tablet 3    losartan (COZAAR) 100 MG tablet Take 1 tablet by mouth daily 90 tablet 1    amLODIPine (NORVASC) 10 MG tablet Take 1 tablet by mouth daily 90 tablet 1    augmented betamethasone dipropionate (DIPROLENE-AF) 0.05 % ointment Apply topically 2 times

## 2025-03-11 ENCOUNTER — RESULTS FOLLOW-UP (OUTPATIENT)
Age: 70
End: 2025-03-11

## 2025-03-11 DIAGNOSIS — M05.9 SEROPOSITIVE RHEUMATOID ARTHRITIS (HCC): Primary | ICD-10-CM

## 2025-03-11 DIAGNOSIS — Z01.89 ENCOUNTER FOR OTHER SPECIFIED SPECIAL EXAMINATIONS: ICD-10-CM

## 2025-03-11 LAB
-: ABNORMAL
ALBUMIN SERPL-MCNC: 3 G/DL (ref 3.5–5)
ALBUMIN/GLOB SERPL: 0.6 (ref 1.1–2.2)
ALP SERPL-CCNC: 92 U/L (ref 45–117)
ALT SERPL-CCNC: 27 U/L (ref 12–78)
ANION GAP SERPL CALC-SCNC: 2 MMOL/L (ref 2–12)
AST SERPL-CCNC: 37 U/L (ref 15–37)
BASOPHILS # BLD: 0.03 K/UL (ref 0–0.1)
BASOPHILS NFR BLD: 0.6 % (ref 0–1)
BILIRUB SERPL-MCNC: 0.7 MG/DL (ref 0.2–1)
BUN SERPL-MCNC: 11 MG/DL (ref 6–20)
BUN/CREAT SERPL: 10 (ref 12–20)
CALCIUM SERPL-MCNC: 8.6 MG/DL (ref 8.5–10.1)
CHLORIDE SERPL-SCNC: 109 MMOL/L (ref 97–108)
CO2 SERPL-SCNC: 28 MMOL/L (ref 21–32)
CREAT SERPL-MCNC: 1.14 MG/DL (ref 0.7–1.3)
CRP SERPL-MCNC: 2.52 MG/DL (ref 0–0.3)
DIFFERENTIAL METHOD BLD: ABNORMAL
EOSINOPHIL # BLD: 0.45 K/UL (ref 0–0.4)
EOSINOPHIL NFR BLD: 8.7 % (ref 0–7)
ERYTHROCYTE [DISTWIDTH] IN BLOOD BY AUTOMATED COUNT: 16 % (ref 11.5–14.5)
ERYTHROCYTE [SEDIMENTATION RATE] IN BLOOD: 65 MM/HR (ref 0–20)
GLOBULIN SER CALC-MCNC: 4.7 G/DL (ref 2–4)
GLUCOSE SERPL-MCNC: 101 MG/DL (ref 65–100)
HAV IGM SER QL: NONREACTIVE
HBV CORE IGM SER QL: ABNORMAL
HBV SURFACE AG SER QL: <0.1 INDEX
HBV SURFACE AG SER QL: NEGATIVE
HCT VFR BLD AUTO: 27.3 % (ref 36.6–50.3)
HCV AB SER IA-ACNC: 0.14 INDEX
HCV AB SERPL QL IA: NONREACTIVE
HGB BLD-MCNC: 9.7 G/DL (ref 12.1–17)
IMM GRANULOCYTES # BLD AUTO: 0.01 K/UL (ref 0–0.04)
IMM GRANULOCYTES NFR BLD AUTO: 0.2 % (ref 0–0.5)
LYMPHOCYTES # BLD: 1.44 K/UL (ref 0.8–3.5)
LYMPHOCYTES NFR BLD: 27.7 % (ref 12–49)
MCH RBC QN AUTO: 37 PG (ref 26–34)
MCHC RBC AUTO-ENTMCNC: 35.5 G/DL (ref 30–36.5)
MCV RBC AUTO: 104.2 FL (ref 80–99)
MONOCYTES # BLD: 0.42 K/UL (ref 0–1)
MONOCYTES NFR BLD: 8.1 % (ref 5–13)
NEUTS SEG # BLD: 2.84 K/UL (ref 1.8–8)
NEUTS SEG NFR BLD: 54.7 % (ref 32–75)
NRBC # BLD: 0 K/UL (ref 0–0.01)
NRBC BLD-RTO: 0 PER 100 WBC
PLATELET # BLD AUTO: 138 K/UL (ref 150–400)
PMV BLD AUTO: 10.8 FL (ref 8.9–12.9)
POTASSIUM SERPL-SCNC: 4.1 MMOL/L (ref 3.5–5.1)
PROT SERPL-MCNC: 7.7 G/DL (ref 6.4–8.2)
RBC # BLD AUTO: 2.62 M/UL (ref 4.1–5.7)
SODIUM SERPL-SCNC: 139 MMOL/L (ref 136–145)
WBC # BLD AUTO: 5.2 K/UL (ref 4.1–11.1)

## 2025-03-11 NOTE — TELEPHONE ENCOUNTER
----- Message from MOHIT Meléndez NP sent at 3/11/2025 10:09 AM EDT -----  Please let him know I would like him to make a lab only appointment in 2-4 weeks to recheck his Hep panel and inflammatory markers. Thank you.  ----- Message -----  From: Yin Romero Incoming [facility] [employer]  Sent: 3/11/2025   6:48 AM EDT  To: MOHIT Fuentes NP   H Plasty Text: Given the location of the defect, shape of the defect and the proximity to free margins a H-plasty was deemed most appropriate for repair.  Using a sterile surgical marker, the appropriate advancement arms of the H-plasty were drawn incorporating the defect and placing the expected incisions within the relaxed skin tension lines where possible. The area thus outlined was incised deep to adipose tissue with a #15 scalpel blade. The skin margins were undermined to an appropriate distance in all directions utilizing iris scissors.  The opposing advancement arms were then advanced into place in opposite direction and anchored with interrupted buried subcutaneous sutures.

## 2025-03-11 NOTE — TELEPHONE ENCOUNTER
Spoke to pt informed pt per [employer] message below   Please let him know I would like him to make a lab only appointment in 2-4 weeks to recheck his Hep panel and inflammatory markers.   Pt verbally acknowledged and was scheduled for the next available lab appt

## 2025-03-15 LAB — RHEUMATOID FACTOR: 53 IU/ML

## 2025-03-19 ENCOUNTER — COMMUNITY OUTREACH (OUTPATIENT)
Facility: CLINIC | Age: 70
End: 2025-03-19

## 2025-04-07 DIAGNOSIS — Z01.89 ENCOUNTER FOR OTHER SPECIFIED SPECIAL EXAMINATIONS: ICD-10-CM

## 2025-04-07 DIAGNOSIS — M05.9 SEROPOSITIVE RHEUMATOID ARTHRITIS (HCC): ICD-10-CM

## 2025-04-08 ENCOUNTER — TELEPHONE (OUTPATIENT)
Age: 70
End: 2025-04-08

## 2025-04-08 LAB
-: ABNORMAL
CRP SERPL-MCNC: 0.76 MG/DL (ref 0–0.3)
HAV IGM SER QL: NONREACTIVE
HBV CORE IGM SER QL: ABNORMAL
HBV SURFACE AG SER QL: <0.1 INDEX
HBV SURFACE AG SER QL: NEGATIVE
HCV AB SER IA-ACNC: 0.06 INDEX
HCV AB SERPL QL IA: NONREACTIVE

## 2025-04-08 NOTE — TELEPHONE ENCOUNTER
Spoke with Marnie from [facility]. Advised that bloodwork for was not sed rate was not sent. No further action needed at this time

## 2025-04-09 LAB
HBV IU/ML: NORMAL IU/ML
HEPATITIS C GENOTYPE: NORMAL
LOG10 HBV IU/ML: NORMAL LOG10 IU/ML
TEST INFORMATION: NORMAL

## 2025-04-17 ENCOUNTER — OFFICE VISIT (OUTPATIENT)
Facility: CLINIC | Age: 70
End: 2025-04-17
Payer: MEDICARE

## 2025-04-17 VITALS
SYSTOLIC BLOOD PRESSURE: 124 MMHG | WEIGHT: 195 LBS | BODY MASS INDEX: 27.92 KG/M2 | HEART RATE: 64 BPM | TEMPERATURE: 97.7 F | DIASTOLIC BLOOD PRESSURE: 66 MMHG | OXYGEN SATURATION: 99 % | HEIGHT: 70 IN | RESPIRATION RATE: 16 BRPM

## 2025-04-17 DIAGNOSIS — Z12.11 COLON CANCER SCREENING: ICD-10-CM

## 2025-04-17 DIAGNOSIS — Z85.528 HISTORY OF RENAL CELL CANCER: ICD-10-CM

## 2025-04-17 DIAGNOSIS — I10 ESSENTIAL HYPERTENSION: ICD-10-CM

## 2025-04-17 DIAGNOSIS — Z86.718 HISTORY OF DVT OF LOWER EXTREMITY: Primary | ICD-10-CM

## 2025-04-17 DIAGNOSIS — D68.59 HYPERCOAGULABLE STATE: ICD-10-CM

## 2025-04-17 DIAGNOSIS — N18.31 STAGE 3A CHRONIC KIDNEY DISEASE (HCC): ICD-10-CM

## 2025-04-17 PROCEDURE — 3017F COLORECTAL CA SCREEN DOC REV: CPT | Performed by: FAMILY MEDICINE

## 2025-04-17 PROCEDURE — 1159F MED LIST DOCD IN RCRD: CPT | Performed by: FAMILY MEDICINE

## 2025-04-17 PROCEDURE — 99214 OFFICE O/P EST MOD 30 MIN: CPT | Performed by: FAMILY MEDICINE

## 2025-04-17 PROCEDURE — 3078F DIAST BP <80 MM HG: CPT | Performed by: FAMILY MEDICINE

## 2025-04-17 PROCEDURE — G8419 CALC BMI OUT NRM PARAM NOF/U: HCPCS | Performed by: FAMILY MEDICINE

## 2025-04-17 PROCEDURE — 1036F TOBACCO NON-USER: CPT | Performed by: FAMILY MEDICINE

## 2025-04-17 PROCEDURE — 1126F AMNT PAIN NOTED NONE PRSNT: CPT | Performed by: FAMILY MEDICINE

## 2025-04-17 PROCEDURE — 3074F SYST BP LT 130 MM HG: CPT | Performed by: FAMILY MEDICINE

## 2025-04-17 PROCEDURE — 1123F ACP DISCUSS/DSCN MKR DOCD: CPT | Performed by: FAMILY MEDICINE

## 2025-04-17 PROCEDURE — G8427 DOCREV CUR MEDS BY ELIG CLIN: HCPCS | Performed by: FAMILY MEDICINE

## 2025-04-17 RX ORDER — LOSARTAN POTASSIUM 100 MG/1
100 TABLET ORAL DAILY
Qty: 90 TABLET | Refills: 3 | Status: SHIPPED | OUTPATIENT
Start: 2025-04-17

## 2025-04-17 RX ORDER — LOSARTAN POTASSIUM 100 MG/1
100 TABLET ORAL DAILY
Qty: 90 TABLET | Refills: 1 | Status: SHIPPED | OUTPATIENT
Start: 2025-04-17 | End: 2025-04-17

## 2025-04-17 RX ORDER — AMLODIPINE BESYLATE 5 MG/1
5 TABLET ORAL DAILY
Qty: 90 TABLET | Refills: 3 | Status: SHIPPED | OUTPATIENT
Start: 2025-04-17

## 2025-04-17 NOTE — PROGRESS NOTES
Chief Complaint   Patient presents with    Follow-up     Fu doing well.   Would like to discuss the blood clots he had last July. Not having any s/s at this time for blood clots.   Med compliant.

## 2025-04-17 NOTE — PROGRESS NOTES
Assessment & Plan  1. Recurrent DVT: Chronic.  - History of recurrent DVTs leading to pulmonary embolisms; on chronic anticoagulation.  - Most recent DVT in left femoral vein; resuming Eliquis 5 mg BID today.  - Discussed Eliquis benefits; monitor for excessive bleeding.  - Prescription for Eliquis 5 mg BID sent to [facility].    2. Rheumatoid Arthritis: Chronic.  - Increased clot risk due to chronic inflammation.  - Keep rheumatoid arthritis well-controlled to reduce clot risk.    3. Chronic Kidney Disease Stage III: Stable.  - Maintain kidney function through regular monitoring and healthy diet.    4. Coronary Artery Disease: Chronic.  - History of CAD with plaque in heart arteries.  - Maintain healthy diet and control rheumatoid arthritis to prevent progression.    5. Hypertension.  - BP well-controlled on amlodipine 10 mg and losartan 100 mg daily; leg swelling likely due to amlodipine.  - Reduce amlodipine to 5 mg daily; monitor BP and report increases.  - Prescription adjusted to amlodipine 5 mg daily; advised to split current tablets.    6. Health Maintenance.  - Overdue for colon cancer screening.  - Cologuard stool test ordered; complete promptly.    Follow-up  - Follow-up in 6 months for overall health and condition management.    It was a pleasure seeing Mr. Joe Kaur today.  Return in about 6 months (around 10/17/2025) for Medicare AWV/6 month follow up.    History of Present Illness  The patient, a 69-year-old male, presents for a 6-month follow-up.    Deep Vein Thromboses (DVTs)  - History of recurrent DVTs leading to pulmonary embolisms, managed with chronic anticoagulation therapy  - Most recent DVT located in the left femoral vein  - No history of right-sided DVTs  - Concerns regarding the etiology of thrombotic events and the necessity for lifelong anticoagulation  - Interested in periodic testing for thrombus formation  - Eliquis (apixaban) discontinued 3 weeks ago and resumed today  - No

## 2025-05-14 DIAGNOSIS — M05.79 RHEUMATOID ARTHRITIS WITH RHEUMATOID FACTOR OF MULTIPLE SITES WITHOUT ORGAN OR SYSTEMS INVOLVEMENT (HCC): ICD-10-CM

## 2025-05-14 RX ORDER — METHOTREXATE 2.5 MG/1
TABLET ORAL
Qty: 72 TABLET | Refills: 0 | Status: SHIPPED | OUTPATIENT
Start: 2025-05-14

## 2025-05-14 NOTE — TELEPHONE ENCOUNTER
Last office visit 3/10/2025  Next office visit 7/10/2025  Lab Results   Component Value Date    WBC 5.2 03/10/2025    HGB 9.7 (L) 03/10/2025    HCT 27.3 (L) 03/10/2025    .2 (H) 03/10/2025     (L) 03/10/2025     Lab Results   Component Value Date     03/10/2025    K 4.1 03/10/2025     (H) 03/10/2025    CO2 28 03/10/2025    BUN 11 03/10/2025    CREATININE 1.14 03/10/2025    GLUCOSE 101 (H) 03/10/2025    CALCIUM 8.6 03/10/2025    BILITOT 0.7 03/10/2025    ALKPHOS 92 03/10/2025    AST 37 03/10/2025    ALT 27 03/10/2025    LABGLOM 69 03/10/2025    GFRAA 67 08/27/2021    AGRATIO 1.0 (L) 07/27/2023    GLOB 4.7 (H) 03/10/2025

## 2025-06-02 ENCOUNTER — TELEPHONE (OUTPATIENT)
Age: 70
End: 2025-06-02

## 2025-06-02 ENCOUNTER — PATIENT MESSAGE (OUTPATIENT)
Facility: CLINIC | Age: 70
End: 2025-06-02

## 2025-06-02 NOTE — TELEPHONE ENCOUNTER
Patient called and stated that he needed to cancel his appt due to his mother passing away. He stated that he will call back at a later time to reschedule.

## 2025-06-04 ENCOUNTER — OFFICE VISIT (OUTPATIENT)
Facility: CLINIC | Age: 70
End: 2025-06-04
Payer: MEDICARE

## 2025-06-04 VITALS
RESPIRATION RATE: 20 BRPM | TEMPERATURE: 97 F | SYSTOLIC BLOOD PRESSURE: 121 MMHG | DIASTOLIC BLOOD PRESSURE: 68 MMHG | OXYGEN SATURATION: 98 % | WEIGHT: 195 LBS | HEIGHT: 70 IN | BODY MASS INDEX: 27.92 KG/M2 | HEART RATE: 79 BPM

## 2025-06-04 DIAGNOSIS — L02.92 FURUNCLE: Primary | ICD-10-CM

## 2025-06-04 PROCEDURE — 1036F TOBACCO NON-USER: CPT

## 2025-06-04 PROCEDURE — 3074F SYST BP LT 130 MM HG: CPT

## 2025-06-04 PROCEDURE — G8419 CALC BMI OUT NRM PARAM NOF/U: HCPCS

## 2025-06-04 PROCEDURE — 1125F AMNT PAIN NOTED PAIN PRSNT: CPT

## 2025-06-04 PROCEDURE — 3017F COLORECTAL CA SCREEN DOC REV: CPT

## 2025-06-04 PROCEDURE — G8428 CUR MEDS NOT DOCUMENT: HCPCS

## 2025-06-04 PROCEDURE — 1123F ACP DISCUSS/DSCN MKR DOCD: CPT

## 2025-06-04 PROCEDURE — 3078F DIAST BP <80 MM HG: CPT

## 2025-06-04 PROCEDURE — 99213 OFFICE O/P EST LOW 20 MIN: CPT

## 2025-06-04 RX ORDER — CHLORHEXIDINE GLUCONATE 40 MG/ML
SOLUTION TOPICAL DAILY PRN
Qty: 236 ML | Refills: 1 | Status: SHIPPED | OUTPATIENT
Start: 2025-06-04

## 2025-06-04 RX ORDER — DOXYCYCLINE HYCLATE 100 MG
100 TABLET ORAL 2 TIMES DAILY
Qty: 20 TABLET | Refills: 0 | Status: SHIPPED | OUTPATIENT
Start: 2025-06-04 | End: 2025-06-14

## 2025-06-04 NOTE — PROGRESS NOTES
Chief Complaint   Patient presents with    Cyst     Under left arm, starting oozing today      \"Have you been to the ER, urgent care clinic since your last visit?  Hospitalized since your last visit?\"    NO    “Have you seen or consulted any other health care providers outside of [facility] since your last visit?”    NO        “Have you had a colorectal cancer screening such as a colonoscopy/FIT/Cologuard?    NO    No colonoscopy on file  No cologuard on file  Date of last FIT: 2/19/2013   No flexible sigmoidoscopy on file         Click Here for Release of Records Request  
development and exacerbation of boils. Prescribed doxycycline 100 mg to be taken twice daily and recommended Hibiclens cleanser for use during morning and evening showers. Advised to apply warm compresses 3 to 4 times daily and cover the boil with a Band-Aid to absorb drainage.    Orders:    doxycycline hyclate (VIBRA-TABS) 100 MG tablet; Take 1 tablet by mouth 2 times daily for 10 days    chlorhexidine gluconate (HIBICLENS) 4 % SOLN external solution; Apply topically daily as needed (use to cleanse area up to 4 times a day)        The patient (or guardian, if applicable) and other individuals in attendance with the patient were advised that Artificial Intelligence will be utilized during this visit to record, process the conversation to generate a clinical note, and support improvement of the AI technology. The patient (or guardian, if applicable) and other individuals in attendance at the appointment consented to the use of AI, including the recording.                Portions of this note may have been populated using smart dictation software and may have \"sounds-like\" errors present.     Pt was counseled on risks, benefits and alternatives of treatment options. All questions were asked and answered and the patient was agreeable with the treatment plan as outlined.

## 2025-07-10 ENCOUNTER — OFFICE VISIT (OUTPATIENT)
Age: 70
End: 2025-07-10
Payer: MEDICARE

## 2025-07-10 VITALS
RESPIRATION RATE: 16 BRPM | SYSTOLIC BLOOD PRESSURE: 120 MMHG | WEIGHT: 186 LBS | BODY MASS INDEX: 26.69 KG/M2 | DIASTOLIC BLOOD PRESSURE: 68 MMHG | TEMPERATURE: 98.1 F | HEART RATE: 80 BPM | OXYGEN SATURATION: 98 %

## 2025-07-10 DIAGNOSIS — M05.9 SEROPOSITIVE RHEUMATOID ARTHRITIS (HCC): Primary | ICD-10-CM

## 2025-07-10 DIAGNOSIS — Z51.81 MEDICATION MONITORING ENCOUNTER: ICD-10-CM

## 2025-07-10 PROCEDURE — 1036F TOBACCO NON-USER: CPT | Performed by: NURSE PRACTITIONER

## 2025-07-10 PROCEDURE — 3078F DIAST BP <80 MM HG: CPT | Performed by: NURSE PRACTITIONER

## 2025-07-10 PROCEDURE — 3074F SYST BP LT 130 MM HG: CPT | Performed by: NURSE PRACTITIONER

## 2025-07-10 PROCEDURE — 99214 OFFICE O/P EST MOD 30 MIN: CPT | Performed by: NURSE PRACTITIONER

## 2025-07-10 PROCEDURE — G8427 DOCREV CUR MEDS BY ELIG CLIN: HCPCS | Performed by: NURSE PRACTITIONER

## 2025-07-10 PROCEDURE — 1160F RVW MEDS BY RX/DR IN RCRD: CPT | Performed by: NURSE PRACTITIONER

## 2025-07-10 PROCEDURE — G8419 CALC BMI OUT NRM PARAM NOF/U: HCPCS | Performed by: NURSE PRACTITIONER

## 2025-07-10 PROCEDURE — 1159F MED LIST DOCD IN RCRD: CPT | Performed by: NURSE PRACTITIONER

## 2025-07-10 PROCEDURE — 1125F AMNT PAIN NOTED PAIN PRSNT: CPT | Performed by: NURSE PRACTITIONER

## 2025-07-10 PROCEDURE — 1123F ACP DISCUSS/DSCN MKR DOCD: CPT | Performed by: NURSE PRACTITIONER

## 2025-07-10 PROCEDURE — 3017F COLORECTAL CA SCREEN DOC REV: CPT | Performed by: NURSE PRACTITIONER

## 2025-07-10 RX ORDER — FOLIC ACID 1 MG/1
1 TABLET ORAL DAILY
Qty: 90 TABLET | Refills: 3 | Status: SHIPPED | OUTPATIENT
Start: 2025-07-10

## 2025-07-10 RX ORDER — METHOTREXATE 2.5 MG/1
15 TABLET ORAL WEEKLY
Qty: 72 TABLET | Refills: 1 | Status: SHIPPED | OUTPATIENT
Start: 2025-07-10

## 2025-07-10 RX ORDER — PREDNISONE 5 MG/1
TABLET ORAL
Qty: 30 TABLET | Refills: 0 | Status: SHIPPED | OUTPATIENT
Start: 2025-07-10

## 2025-07-10 ASSESSMENT — ENCOUNTER SYMPTOMS
ABDOMINAL PAIN: 0
DIARRHEA: 0
CONSTIPATION: 0
SHORTNESS OF BREATH: 0
COUGH: 0
SORE THROAT: 0
NAUSEA: 0
TROUBLE SWALLOWING: 0
BLOOD IN STOOL: 0
EYE PAIN: 1
EYE REDNESS: 0
VOMITING: 0

## 2025-07-10 ASSESSMENT — PATIENT HEALTH QUESTIONNAIRE - PHQ9
SUM OF ALL RESPONSES TO PHQ QUESTIONS 1-9: 0
2. FEELING DOWN, DEPRESSED OR HOPELESS: NOT AT ALL
SUM OF ALL RESPONSES TO PHQ QUESTIONS 1-9: 0
1. LITTLE INTEREST OR PLEASURE IN DOING THINGS: NOT AT ALL

## 2025-07-10 NOTE — PROGRESS NOTES
Chief Complaint   Patient presents with    Joint Pain     1. Have you been to the ER, urgent care clinic since your last visit?  Hospitalized since your last visit?No    2. Have you seen or consulted any other health care providers outside of the [facility] since your last visit?  Include any pap smears or colon screening. No

## 2025-07-10 NOTE — PROGRESS NOTES
Joe Kaur (:  1955) is a 69 y.o. male    2025 Hep panel nl  3/10/2025 RF 53  2020 CCP >250,     Subjective   The patient is a 69 y.o. male here for a follow up visit for rheumatoid arthritis. He is currently taking Methotrexate 15mg once a week. He also has a history of iritis and he denies flares of eye pain or redness. We reviewed his labs and he states he is not taking Folic Acid daily.  He reports he recently lost his mother and he thinks that has caused him to flare. He is having pain in his left thumb, neck and knees. He denies swollen joints. He denies morning stiffness.  He plays tennis four times a week for exercise when the weather is warm. He denies infections or antibiotics since his last visit.     Review of Systems   Constitutional:  Negative for chills and fever.   HENT:  Negative for mouth sores, sore throat and trouble swallowing.         Denies nasals sores  Denies dry mouth   Eyes:  Positive for pain (had a brief episode of pain). Negative for redness.        Denies dry eyes   Respiratory:  Negative for cough and shortness of breath.    Cardiovascular:  Negative for chest pain.   Gastrointestinal:  Negative for abdominal pain, blood in stool, constipation, diarrhea, nausea and vomiting.        Denies dark, tarry stools   Genitourinary:  Negative for dysuria and hematuria.   Musculoskeletal:  Negative for arthralgias and joint swelling.   Skin:  Negative for rash.     Current Outpatient Medications   Medication Sig Dispense Refill    chlorhexidine gluconate (HIBICLENS) 4 % SOLN external solution Apply topically daily as needed (use to cleanse area up to 4 times a day) 236 mL 1    methotrexate (RHEUMATREX) 2.5 MG chemo tablet TAKE 6 TABLETS BY MOUTH ONE TIME PER WEEK 72 tablet 0    amLODIPine (NORVASC) 5 MG tablet Take 1 tablet by mouth daily 90 tablet 3    apixaban (ELIQUIS) 5 MG TABS tablet Take 1 tablet by mouth 2 times daily 180 tablet 3    losartan (COZAAR) 100 MG

## 2025-07-10 NOTE — PATIENT INSTRUCTIONS
Thank you for visiting [facility]!      For future medication refills, we require updated lab results and an upcoming appointment to be in our system to refill prescriptions.  Without these two things, your refill will be DENIED.  If you miss your upcoming appointment, your refill will also be DENIED.      We appreciate your understanding of this critical clinical aspect of our practice. - BLACK Felder       Start Prednisone 5mg tablets: take 4 pills for 3 days, 3 pills for 3 days, 2 pills for 3 days and 1 pill for 3 days.     Return to the clinic in 6 weeks

## 2025-07-11 ENCOUNTER — TELEPHONE (OUTPATIENT)
Age: 70
End: 2025-07-11

## 2025-07-11 ENCOUNTER — HOSPITAL ENCOUNTER (EMERGENCY)
Facility: HOSPITAL | Age: 70
Discharge: HOME | End: 2025-07-11
Attending: EMERGENCY MEDICINE
Payer: MEDICARE

## 2025-07-11 VITALS
WEIGHT: 186 LBS | HEIGHT: 70 IN | HEART RATE: 68 BPM | RESPIRATION RATE: 16 BRPM | SYSTOLIC BLOOD PRESSURE: 137 MMHG | OXYGEN SATURATION: 98 % | BODY MASS INDEX: 26.63 KG/M2 | TEMPERATURE: 98.6 F | DIASTOLIC BLOOD PRESSURE: 71 MMHG

## 2025-07-11 DIAGNOSIS — D69.6 THROMBOCYTOPENIA: Primary | ICD-10-CM

## 2025-07-11 DIAGNOSIS — M06.9 RHEUMATOID ARTHRITIS, INVOLVING UNSPECIFIED SITE, UNSPECIFIED WHETHER RHEUMATOID FACTOR PRESENT (HCC): ICD-10-CM

## 2025-07-11 LAB
ALBUMIN SERPL-MCNC: 2.8 G/DL (ref 3.5–5)
ALBUMIN/GLOB SERPL: 0.5 (ref 1.1–2.2)
ALP SERPL-CCNC: 96 U/L (ref 45–117)
ALT SERPL-CCNC: 27 U/L (ref 12–78)
ANION GAP SERPL CALC-SCNC: 6 MMOL/L (ref 2–12)
AST SERPL-CCNC: 29 U/L (ref 15–37)
BASOPHILS # BLD: 0 K/UL (ref 0–0.1)
BASOPHILS NFR BLD: 0 % (ref 0–1)
BILIRUB SERPL-MCNC: 1.9 MG/DL (ref 0.2–1)
BUN SERPL-MCNC: 17 MG/DL (ref 6–20)
BUN/CREAT SERPL: 11 (ref 12–20)
CALCIUM SERPL-MCNC: 8.6 MG/DL (ref 8.5–10.1)
CHLORIDE SERPL-SCNC: 106 MMOL/L (ref 97–108)
CO2 SERPL-SCNC: 25 MMOL/L (ref 21–32)
COMMENT:: NORMAL
CREAT SERPL-MCNC: 1.59 MG/DL (ref 0.7–1.3)
DIFFERENTIAL METHOD BLD: ABNORMAL
EOSINOPHIL # BLD: 0 K/UL (ref 0–0.4)
EOSINOPHIL NFR BLD: 0 % (ref 0–7)
ERYTHROCYTE [DISTWIDTH] IN BLOOD BY AUTOMATED COUNT: 12.5 % (ref 11.5–14.5)
FOLATE SERPL-MCNC: 2.5 NG/ML (ref 5–21)
GLOBULIN SER CALC-MCNC: 5.1 G/DL (ref 2–4)
GLUCOSE SERPL-MCNC: 109 MG/DL (ref 65–100)
HCT VFR BLD AUTO: 26.1 % (ref 36.6–50.3)
HGB BLD-MCNC: 9.3 G/DL (ref 12.1–17)
IMM GRANULOCYTES # BLD AUTO: 0 K/UL
IMM GRANULOCYTES NFR BLD AUTO: 0 %
LYMPHOCYTES # BLD: 0.93 K/UL (ref 0.8–3.5)
LYMPHOCYTES NFR BLD: 25 % (ref 12–49)
MCH RBC QN AUTO: 37.7 PG (ref 26–34)
MCHC RBC AUTO-ENTMCNC: 35.6 G/DL (ref 30–36.5)
MCV RBC AUTO: 105.7 FL (ref 80–99)
MONOCYTES # BLD: 0.11 K/UL (ref 0–1)
MONOCYTES NFR BLD: 3 % (ref 5–13)
NEUTS SEG # BLD: 2.66 K/UL (ref 1.8–8)
NEUTS SEG NFR BLD: 72 % (ref 32–75)
NRBC # BLD: 0 K/UL (ref 0–0.01)
NRBC BLD-RTO: 0 PER 100 WBC
PLATELET # BLD AUTO: 25 K/UL (ref 150–400)
PMV BLD AUTO: 11.7 FL (ref 8.9–12.9)
POTASSIUM SERPL-SCNC: 3.6 MMOL/L (ref 3.5–5.1)
PROT SERPL-MCNC: 7.9 G/DL (ref 6.4–8.2)
RBC # BLD AUTO: 2.47 M/UL (ref 4.1–5.7)
RBC MORPH BLD: ABNORMAL
SODIUM SERPL-SCNC: 137 MMOL/L (ref 136–145)
SPECIMEN HOLD: NORMAL
VIT B12 SERPL-MCNC: 200 PG/ML (ref 193–986)
WBC # BLD AUTO: 3.7 K/UL (ref 4.1–11.1)

## 2025-07-11 PROCEDURE — 99283 EMERGENCY DEPT VISIT LOW MDM: CPT

## 2025-07-11 PROCEDURE — 99221 1ST HOSP IP/OBS SF/LOW 40: CPT | Performed by: INTERNAL MEDICINE

## 2025-07-11 PROCEDURE — 82746 ASSAY OF FOLIC ACID SERUM: CPT

## 2025-07-11 PROCEDURE — 85025 COMPLETE CBC W/AUTO DIFF WBC: CPT

## 2025-07-11 PROCEDURE — 36415 COLL VENOUS BLD VENIPUNCTURE: CPT

## 2025-07-11 PROCEDURE — 94761 N-INVAS EAR/PLS OXIMETRY MLT: CPT

## 2025-07-11 PROCEDURE — 80053 COMPREHEN METABOLIC PANEL: CPT

## 2025-07-11 PROCEDURE — 82607 VITAMIN B-12: CPT

## 2025-07-11 ASSESSMENT — PAIN - FUNCTIONAL ASSESSMENT: PAIN_FUNCTIONAL_ASSESSMENT: NONE - DENIES PAIN

## 2025-07-11 NOTE — CONSULTS
acid.  He expresses difficulty in bereavement of his elderly mother's passing since May.  He is adamant about not wanting to be admitted to the hospital.    Review of Systems Provided by:  Patient  Review of Systems: A complete review of systems was obtained, reviewed.  Pertinent findings reviewed above.    O:  Vitals:    07/11/25 1215   BP: 137/71   Pulse:    Resp:    Temp:    SpO2: 98%     Physical Exam  Constitutional: No acute distress. and Non-toxic appearance.  Eyes: Anicteric sclerae.  Cardiovascular: S1,S2 auscultated. No pitting edema.  Pulmonary: Normal Respiratory Effort. No wheezing. No rhonchi. No rales.   Abdominal: Normal bowel sounds. Soft Abdomen to palpation. No abdominal tenderness.   Skin:  No rash.   Neurological: Alert and oriented. No tremor on inspection.      I personally reviewed the following labs:  Lab Results   Component Value Date/Time    WBC 3.7 07/11/2025 11:04 AM    HGB 9.3 07/11/2025 11:04 AM    HCT 26.1 07/11/2025 11:04 AM    PLT 25 07/11/2025 11:04 AM    .7 07/11/2025 11:04 AM     Lab Results   Component Value Date/Time     07/11/2025 11:04 AM    K 3.6 07/11/2025 11:04 AM     07/11/2025 11:04 AM    CO2 25 07/11/2025 11:04 AM    BUN 17 07/11/2025 11:04 AM    GFRAA 67 08/27/2021 08:53 AM     Lab Results   Component Value Date/Time    ALT 27 07/11/2025 11:04 AM    AST 29 07/11/2025 11:04 AM        Iron (ug/dL)   Date Value   09/19/2024 158 (H)     Iron % Saturation (%)   Date Value   09/19/2024 74 (H)     Ferritin (NG/ML)   Date Value   09/19/2024 1,080 (H)         No results found for: \"RBCF\"    LD (U/L)   Date Value   09/19/2024 273 (H)     Haptoglobin (mg/dL)   Date Value   09/19/2024 99         A/P:  Thrombocytopenia  -- Strongly recommended patient remain at [facility] for further workup of his thrombocytopenia.  He did have a smear reviewed yesterday and no mention of schistocytosis.  -- Cannot exclude an immune thrombocytopenia but recommend he surely  follow through with steroids per rheumatology in the event he does have immune thrombocytopenia.  -- Reviewed alarm signs and symptoms to seek emergency care if patient ultimately declines to stay for admission.  -- As a backup but not endorsement of his plan to leave the hospital, we have arranged for an outpatient follow-up appointment on Monday.    Hypercoagulable state  -- Explained to patient that in his current thrombocytopenic state that I do not recommend that he take anticoagulation.     Macrocytic anemia   -- Unclear if he is having anemia of chronic inflammatory disease.  -- Again recommended patient stay for further workup.    High risk medication use  -- Hold anticoagulation until improvement in platelets to be over 50    Follow-Up:  Patient acknowledged understanding of our recommendation for him to stay hospitalized but he and informed decision making expresses he does not plan to stay.  I communicated this to Dr. Montague in the emergency room.    I have personally seen and evaluated the patient in conjunction with Ann Schoeneweis, NP. I find the patient's history and physical exam are consistent with the NP's documentation.  I agree with the above assessment and plan, which I have modified as needed.    Rivera Marquez MD  Hematology/Medical Oncology Provider  [facility]  P: [phone]    Signed By: Rivera Marquez MD   7/12/25 at 6:03 AM EDT

## 2025-07-11 NOTE — ED TRIAGE NOTES
Pt ambulatory to ED for reevaluation of low Platelet level (36 drawn at Rheumatologist office appointment yesterday). Denies any complaints.

## 2025-07-11 NOTE — ED PROVIDER NOTES
[facility] EMERGENCY DEPARTMENT  EMERGENCY DEPARTMENT ENCOUNTER        CHIEF COMPLAINT       Chief Complaint   Patient presents with    Abnormal Lab Results         HISTORY OF PRESENT ILLNESS      69y M here with low platelets. Had routine blood work drawn by his rheumatologist yesterday. Was called today and told to come to the ED due to platelets of 36. No hx of similar. No unexplained bruising or rashes. No bleeding when brushing teeth or any other unexplained bleeding. No new meds. States he hasn't had alcohol in about 2 weeks but prior to this would have one beer after work and \"maybe 2-3 beers\" on the weekend days.     Review of External Medical Records:     Nursing Notes were reviewed.    REVIEW OF SYSTEMS         Review of Systems   Constitutional: (-) weight loss.   HEENT: (-) stiff neck   Eyes: (-) discharge.   Respiratory: (-) cough.    Cardiovascular: (-) syncope.   Gastrointestinal: (-) blood in stool.   Genitourinary: (-) hematuria.  Musculoskeletal: (-) myalgias.   Neurological: (-) seizure.   Skin: (-) petechiae  Lymph/Immunologic: (-) enlarged lymph nodes  All other systems reviewed and are negative.          PAST MEDICAL HISTORY     Past Medical History:   Diagnosis Date    Acute pulmonary embolism with acute cor pulmonale (HCC) 07/13/2024    BPH (benign prostatic hypertrophy) 11/19/2012    Cellulitis of left leg 07/13/2024    2/2 venous ulcer of the L leg from DVT.     Medication: Amoxicillin 500mg TID x 10 days (started 07/14/2024)  - patient thinks maybe a little better looking, no really sure. Needs me to look at it.      Eczema 11/19/2012    Elevated cholesterol 11/19/2012    GERD (gastroesophageal reflux disease) 11/11/2016    History of renal cell cancer 11/19/2012    Clear cell    HTN (hypertension) 11/19/2012    Iritis 11/19/2012    RA (rheumatoid arthritis) (HCC) 11/19/2012    Renal cell cancer (HCC) 11/19/2012    Venous stasis ulcer of left lower leg with edema of

## 2025-07-12 PROBLEM — D69.6 THROMBOCYTOPENIA: Status: ACTIVE | Noted: 2025-07-12

## 2025-07-12 LAB
ALBUMIN SERPL-MCNC: 3 G/DL (ref 3.5–5)
ALBUMIN/GLOB SERPL: 0.6 (ref 1.1–2.2)
ALP SERPL-CCNC: 96 U/L (ref 45–117)
ALT SERPL-CCNC: 31 U/L (ref 12–78)
ANION GAP SERPL CALC-SCNC: 10 MMOL/L (ref 2–12)
AST SERPL-CCNC: 28 U/L (ref 15–37)
BASOPHILS # BLD: 0 K/UL (ref 0–0.1)
BASOPHILS NFR BLD: 0 % (ref 0–1)
BILIRUB SERPL-MCNC: 1.3 MG/DL (ref 0.2–1)
BUN SERPL-MCNC: 22 MG/DL (ref 6–20)
BUN/CREAT SERPL: 11 (ref 12–20)
CALCIUM SERPL-MCNC: 8.6 MG/DL (ref 8.5–10.1)
CHLORIDE SERPL-SCNC: 105 MMOL/L (ref 97–108)
CO2 SERPL-SCNC: 23 MMOL/L (ref 21–32)
CREAT SERPL-MCNC: 1.95 MG/DL (ref 0.7–1.3)
CRP SERPL-MCNC: 6.53 MG/DL (ref 0–0.3)
DIFFERENTIAL METHOD BLD: ABNORMAL
EOSINOPHIL # BLD: 0.23 K/UL (ref 0–0.4)
EOSINOPHIL NFR BLD: 5 % (ref 0–7)
ERYTHROCYTE [DISTWIDTH] IN BLOOD BY AUTOMATED COUNT: 12.8 % (ref 11.5–14.5)
ERYTHROCYTE [SEDIMENTATION RATE] IN BLOOD: 69 MM/HR (ref 0–20)
GLOBULIN SER CALC-MCNC: 4.9 G/DL (ref 2–4)
GLUCOSE SERPL-MCNC: 108 MG/DL (ref 65–100)
HCT VFR BLD AUTO: 29.8 % (ref 36.6–50.3)
HGB BLD-MCNC: 10.2 G/DL (ref 12.1–17)
IMM GRANULOCYTES # BLD AUTO: 0 K/UL
IMM GRANULOCYTES NFR BLD AUTO: 0 %
LYMPHOCYTES # BLD: 1.15 K/UL (ref 0.8–3.5)
LYMPHOCYTES NFR BLD: 25 % (ref 12–49)
MCH RBC QN AUTO: 37.6 PG (ref 26–34)
MCHC RBC AUTO-ENTMCNC: 34.2 G/DL (ref 30–36.5)
MCV RBC AUTO: 110 FL (ref 80–99)
MONOCYTES # BLD: 0.09 K/UL (ref 0–1)
MONOCYTES NFR BLD: 2 % (ref 5–13)
NEUTS SEG # BLD: 3.13 K/UL (ref 1.8–8)
NEUTS SEG NFR BLD: 68 % (ref 32–75)
NRBC # BLD: 0 K/UL (ref 0–0.01)
NRBC BLD-RTO: 0 PER 100 WBC
PATH REV BLD -IMP: ABNORMAL
PLATELET # BLD AUTO: 36 K/UL (ref 150–400)
PMV BLD AUTO: 11.9 FL (ref 8.9–12.9)
POTASSIUM SERPL-SCNC: 3.7 MMOL/L (ref 3.5–5.1)
PROT SERPL-MCNC: 7.9 G/DL (ref 6.4–8.2)
RBC # BLD AUTO: 2.71 M/UL (ref 4.1–5.7)
RBC MORPH BLD: ABNORMAL
RBC MORPH BLD: ABNORMAL
SODIUM SERPL-SCNC: 138 MMOL/L (ref 136–145)
WBC # BLD AUTO: 4.6 K/UL (ref 4.1–11.1)

## 2025-07-14 ENCOUNTER — OFFICE VISIT (OUTPATIENT)
Age: 70
End: 2025-07-14
Payer: MEDICARE

## 2025-07-14 VITALS
RESPIRATION RATE: 16 BRPM | DIASTOLIC BLOOD PRESSURE: 63 MMHG | SYSTOLIC BLOOD PRESSURE: 114 MMHG | WEIGHT: 189.6 LBS | BODY MASS INDEX: 27.14 KG/M2 | OXYGEN SATURATION: 100 % | TEMPERATURE: 97.9 F | HEART RATE: 64 BPM | HEIGHT: 70 IN

## 2025-07-14 DIAGNOSIS — D69.6 THROMBOCYTOPENIA: Primary | ICD-10-CM

## 2025-07-14 DIAGNOSIS — R79.82 ELEVATED C-REACTIVE PROTEIN (CRP): ICD-10-CM

## 2025-07-14 DIAGNOSIS — D69.6 THROMBOCYTOPENIA: ICD-10-CM

## 2025-07-14 DIAGNOSIS — E53.8 FOLATE DEFICIENCY: ICD-10-CM

## 2025-07-14 LAB
BASOPHILS # BLD: 0 K/UL (ref 0–0.1)
BASOPHILS NFR BLD: 0 % (ref 0–1)
DIFFERENTIAL METHOD BLD: ABNORMAL
EOSINOPHIL # BLD: 0 K/UL (ref 0–0.4)
EOSINOPHIL NFR BLD: 0 % (ref 0–7)
ERYTHROCYTE [DISTWIDTH] IN BLOOD BY AUTOMATED COUNT: 12 % (ref 11.5–14.5)
HCT VFR BLD AUTO: 25.7 % (ref 36.6–50.3)
HGB BLD-MCNC: 9 G/DL (ref 12.1–17)
IMM GRANULOCYTES # BLD AUTO: 0 K/UL
IMM GRANULOCYTES NFR BLD AUTO: 0 %
LYMPHOCYTES # BLD: 2.13 K/UL (ref 0.8–3.5)
LYMPHOCYTES NFR BLD: 52 % (ref 12–49)
MCH RBC QN AUTO: 38.1 PG (ref 26–34)
MCHC RBC AUTO-ENTMCNC: 35 G/DL (ref 30–36.5)
MCV RBC AUTO: 108.9 FL (ref 80–99)
MONOCYTES # BLD: 0.04 K/UL (ref 0–1)
MONOCYTES NFR BLD: 1 % (ref 5–13)
NEUTS SEG # BLD: 1.93 K/UL (ref 1.8–8)
NEUTS SEG NFR BLD: 47 % (ref 32–75)
NRBC # BLD: 0 K/UL (ref 0–0.01)
NRBC BLD-RTO: 0 PER 100 WBC
PLATELET # BLD AUTO: 12 K/UL (ref 150–400)
RBC # BLD AUTO: 2.36 M/UL (ref 4.1–5.7)
RBC MORPH BLD: ABNORMAL
WBC # BLD AUTO: 4.1 K/UL (ref 4.1–11.1)

## 2025-07-14 PROCEDURE — 99213 OFFICE O/P EST LOW 20 MIN: CPT | Performed by: INTERNAL MEDICINE

## 2025-07-14 PROCEDURE — 1159F MED LIST DOCD IN RCRD: CPT | Performed by: INTERNAL MEDICINE

## 2025-07-14 PROCEDURE — 3074F SYST BP LT 130 MM HG: CPT | Performed by: INTERNAL MEDICINE

## 2025-07-14 PROCEDURE — G8427 DOCREV CUR MEDS BY ELIG CLIN: HCPCS | Performed by: INTERNAL MEDICINE

## 2025-07-14 PROCEDURE — 1123F ACP DISCUSS/DSCN MKR DOCD: CPT | Performed by: INTERNAL MEDICINE

## 2025-07-14 PROCEDURE — 3017F COLORECTAL CA SCREEN DOC REV: CPT | Performed by: INTERNAL MEDICINE

## 2025-07-14 PROCEDURE — 1126F AMNT PAIN NOTED NONE PRSNT: CPT | Performed by: INTERNAL MEDICINE

## 2025-07-14 PROCEDURE — 1160F RVW MEDS BY RX/DR IN RCRD: CPT | Performed by: INTERNAL MEDICINE

## 2025-07-14 PROCEDURE — 1036F TOBACCO NON-USER: CPT | Performed by: INTERNAL MEDICINE

## 2025-07-14 PROCEDURE — 3078F DIAST BP <80 MM HG: CPT | Performed by: INTERNAL MEDICINE

## 2025-07-14 PROCEDURE — G8419 CALC BMI OUT NRM PARAM NOF/U: HCPCS | Performed by: INTERNAL MEDICINE

## 2025-07-14 NOTE — PROGRESS NOTES
Joe Kaur is a 70 y.o. male follow up for Hypercoagulable state.      1. Have you been to the ER, urgent care clinic since your last visit?  Hospitalized since your last visit? Yes, 7/11/25 at [facility]     2. Have you seen or consulted any other health care providers outside of the [facility] since your last visit?  Include any pap smears or colon screening. No

## 2025-07-14 NOTE — PROGRESS NOTES
[facility] [place] at [facility] [place]  [address], [address] [facility] [address]  W: [phone]  F: [phone] Patient ID  Name: Joe Kaur  YOB: 1955  MRN: [id number]  Referring Provider:   No referring provider defined for this encounter.  Primary Care Provider:   Ochoa Wisdom MD       HEMATOLOGY/MEDICAL ONCOLOGY  NOTE     Reason for Evaluation:     Chief Complaint   Patient presents with    Follow-up     Hypercoagulable state     Subjective:     History of Present Illness:   Date of Visit: 7/14/2025  Joe Kaur is a 70 y.o. male who presents for a follow-up evaluation for ER for close follow-up as patient declined admission.   --- He started Prednisone and reports feeling better since Friday.  --- He reports being more active.      -----  Past Medical History:   Diagnosis Date    Acute pulmonary embolism with acute cor pulmonale ([facility]) 07/13/2024    BPH (benign prostatic hypertrophy) 11/19/2012    Cellulitis of left leg 07/13/2024    2/2 venous ulcer of the L leg from DVT.     Medication: Amoxicillin 500mg TID x 10 days (started 07/14/2024)  - patient thinks maybe a little better looking, no really sure. Needs me to look at it.      Eczema 11/19/2012    Elevated cholesterol 11/19/2012    GERD (gastroesophageal reflux disease) 11/11/2016    History of renal cell cancer 11/19/2012    Clear cell    HTN (hypertension) 11/19/2012    Iritis 11/19/2012    RA (rheumatoid arthritis) ([facility]) 11/19/2012    Renal cell cancer ([facility]) 11/19/2012    Venous stasis ulcer of left lower leg with edema of left lower leg ([facility]) 07/13/2024    2/2 acute DVT second time. Led to cellulitis.      Interval Hx:  - has been keeping leg elevated.  - hasn't been able to tolerate full compression wraps.        Past Surgical History:   Procedure Laterality Date    KIDNEY REMOVAL        Social History     Tobacco Use    Smoking status: Never     Passive exposure: Never    Smokeless tobacco:

## 2025-07-15 ENCOUNTER — TELEPHONE (OUTPATIENT)
Age: 70
End: 2025-07-15

## 2025-07-15 DIAGNOSIS — D69.6 THROMBOCYTOPENIA: ICD-10-CM

## 2025-07-15 DIAGNOSIS — D69.6 THROMBOCYTOPENIA: Primary | ICD-10-CM

## 2025-07-15 LAB
BASOPHILS # BLD: 0 K/UL (ref 0–0.1)
BASOPHILS NFR BLD: 0 % (ref 0–1)
DIFFERENTIAL METHOD BLD: ABNORMAL
EOSINOPHIL # BLD: 0.02 K/UL (ref 0–0.4)
EOSINOPHIL NFR BLD: 0.5 % (ref 0–7)
ERYTHROCYTE [DISTWIDTH] IN BLOOD BY AUTOMATED COUNT: 11.8 % (ref 11.5–14.5)
HCT VFR BLD AUTO: 23.7 % (ref 36.6–50.3)
HGB BLD-MCNC: 8.3 G/DL (ref 12.1–17)
IMM GRANULOCYTES # BLD AUTO: 0.01 K/UL (ref 0–0.04)
IMM GRANULOCYTES NFR BLD AUTO: 0.3 % (ref 0–0.5)
LYMPHOCYTES # BLD: 2.06 K/UL (ref 0.8–3.5)
LYMPHOCYTES NFR BLD: 54.2 % (ref 12–49)
MCH RBC QN AUTO: 37.6 PG (ref 26–34)
MCHC RBC AUTO-ENTMCNC: 35 G/DL (ref 30–36.5)
MCV RBC AUTO: 107.2 FL (ref 80–99)
MONOCYTES # BLD: 0.06 K/UL (ref 0–1)
MONOCYTES NFR BLD: 1.6 % (ref 5–13)
NEUTS SEG # BLD: 1.65 K/UL (ref 1.8–8)
NEUTS SEG NFR BLD: 43.4 % (ref 32–75)
NRBC # BLD: 0 K/UL (ref 0–0.01)
NRBC BLD-RTO: 0 PER 100 WBC
PLATELET # BLD AUTO: 7 K/UL (ref 150–400)
RBC # BLD AUTO: 2.21 M/UL (ref 4.1–5.7)
RBC MORPH BLD: ABNORMAL
WBC # BLD AUTO: 3.8 K/UL (ref 4.1–11.1)

## 2025-07-15 RX ORDER — FAMOTIDINE 20 MG/1
20 TABLET, FILM COATED ORAL 2 TIMES DAILY
Qty: 14 TABLET | Refills: 0 | Status: ON HOLD | OUTPATIENT
Start: 2025-07-15 | End: 2025-07-22

## 2025-07-15 RX ORDER — FAMOTIDINE 20 MG/1
20 TABLET, FILM COATED ORAL 2 TIMES DAILY
Qty: 8 TABLET | Refills: 0 | Status: SHIPPED | OUTPATIENT
Start: 2025-07-15 | End: 2025-07-15

## 2025-07-15 NOTE — TELEPHONE ENCOUNTER
Patient called and stated that he got a notification from his pharmacy stating that it is too early to refill his famotidine medication and that he will have to wait until 7/18. Pt stated that he wanted to know what to do if he can't get it until then. Requested a call back.     CB#   [phone]

## 2025-07-15 NOTE — TELEPHONE ENCOUNTER
07/15/25 8:46 AM Received phone call from Giuliana with [facility] to report critical lab, Platelets 12.  She reports this value is in Epic.  Lab value reported to Nery Parikh NP.

## 2025-07-15 NOTE — TELEPHONE ENCOUNTER
[facility] [place] at [facility]  [phone]    Phone call to patient to discuss lab results and next steps.     PLT resulted at 12, labs drawn on 7/14. Pt notified to repeat labs tomorrow, 7/16.     Pt to start high dose dexamethasone 40 mg PO daily for 4 days and pepcid 20 mg every 12 hours as prophylaxis.     Pt aware to stop taking prednisone prescribed when discharged from hospital. He had previously been recommended to stay inpatient and he declined.  He had no further questions. Pt aware of s/s bleeding and when to call clinic.       Signed By: MOHIT Benitez - CNP   7/15/25 at 9:20 AM EDT

## 2025-07-16 ENCOUNTER — TELEPHONE (OUTPATIENT)
Age: 70
End: 2025-07-16

## 2025-07-16 ENCOUNTER — HOSPITAL ENCOUNTER (INPATIENT)
Facility: HOSPITAL | Age: 70
LOS: 5 days | Discharge: HOME | DRG: 809 | End: 2025-07-21
Attending: EMERGENCY MEDICINE | Admitting: FAMILY MEDICINE
Payer: MEDICARE

## 2025-07-16 DIAGNOSIS — D61.818 PANCYTOPENIA (HCC): Primary | ICD-10-CM

## 2025-07-16 DIAGNOSIS — D69.6 THROMBOCYTOPENIA: ICD-10-CM

## 2025-07-16 PROBLEM — I25.10 CORONARY ARTERY DISEASE INVOLVING NATIVE CORONARY ARTERY OF NATIVE HEART WITHOUT ANGINA PECTORIS: Status: ACTIVE | Noted: 2024-06-06

## 2025-07-16 LAB
ALBUMIN SERPL-MCNC: 2.8 G/DL (ref 3.5–5)
ALBUMIN/GLOB SERPL: 0.6 (ref 1.1–2.2)
ALP SERPL-CCNC: 83 U/L (ref 45–117)
ALT SERPL-CCNC: 45 U/L (ref 12–78)
ANION GAP SERPL CALC-SCNC: 5 MMOL/L (ref 2–12)
AST SERPL-CCNC: 38 U/L (ref 15–37)
BASOPHILS # BLD: 0 K/UL (ref 0–0.1)
BASOPHILS NFR BLD: 0 % (ref 0–1)
BILIRUB SERPL-MCNC: 0.8 MG/DL (ref 0.2–1)
BUN SERPL-MCNC: 20 MG/DL (ref 6–20)
BUN/CREAT SERPL: 13 (ref 12–20)
CALCIUM SERPL-MCNC: 8.3 MG/DL (ref 8.5–10.1)
CHLORIDE SERPL-SCNC: 109 MMOL/L (ref 97–108)
CO2 SERPL-SCNC: 23 MMOL/L (ref 21–32)
COMMENT:: NORMAL
CREAT SERPL-MCNC: 1.55 MG/DL (ref 0.7–1.3)
CREAT UR-MCNC: 107 MG/DL
DIFFERENTIAL METHOD BLD: ABNORMAL
EOSINOPHIL # BLD: 0.03 K/UL (ref 0–0.4)
EOSINOPHIL NFR BLD: 1 % (ref 0–7)
ERYTHROCYTE [DISTWIDTH] IN BLOOD BY AUTOMATED COUNT: 12.2 % (ref 11.5–14.5)
ERYTHROCYTE [DISTWIDTH] IN BLOOD BY AUTOMATED COUNT: 12.4 % (ref 11.5–14.5)
GLOBULIN SER CALC-MCNC: 4.5 G/DL (ref 2–4)
GLUCOSE SERPL-MCNC: 93 MG/DL (ref 65–100)
HCT VFR BLD AUTO: 21.8 % (ref 36.6–50.3)
HCT VFR BLD AUTO: 23.1 % (ref 36.6–50.3)
HGB BLD-MCNC: 7.7 G/DL (ref 12.1–17)
HGB BLD-MCNC: 8.3 G/DL (ref 12.1–17)
IMM GRANULOCYTES # BLD AUTO: 0 K/UL (ref 0–0.04)
IMM GRANULOCYTES NFR BLD AUTO: 0 % (ref 0–0.5)
LYMPHOCYTES # BLD: 1.37 K/UL (ref 0.8–3.5)
LYMPHOCYTES NFR BLD: 55 % (ref 12–49)
MAGNESIUM SERPL-MCNC: 1.6 MG/DL (ref 1.6–2.4)
MCH RBC QN AUTO: 36.8 PG (ref 26–34)
MCH RBC QN AUTO: 37.7 PG (ref 26–34)
MCHC RBC AUTO-ENTMCNC: 35.3 G/DL (ref 30–36.5)
MCHC RBC AUTO-ENTMCNC: 35.9 G/DL (ref 30–36.5)
MCV RBC AUTO: 104.3 FL (ref 80–99)
MCV RBC AUTO: 105 FL (ref 80–99)
MONOCYTES # BLD: 0.15 K/UL (ref 0–1)
MONOCYTES NFR BLD: 6 % (ref 5–13)
NEUTS SEG # BLD: 0.95 K/UL (ref 1.8–8)
NEUTS SEG NFR BLD: 38 % (ref 32–75)
NRBC # BLD: 0 K/UL (ref 0–0.01)
NRBC # BLD: 0 K/UL (ref 0–0.01)
NRBC BLD-RTO: 0 PER 100 WBC
NRBC BLD-RTO: 0 PER 100 WBC
PLATELET # BLD AUTO: 20 K/UL (ref 150–400)
PLATELET # BLD AUTO: 5 K/UL (ref 150–400)
PLATELET COMMENT: ABNORMAL
PMV BLD AUTO: 9 FL (ref 8.9–12.9)
POTASSIUM SERPL-SCNC: 3.8 MMOL/L (ref 3.5–5.1)
PROT SERPL-MCNC: 7.3 G/DL (ref 6.4–8.2)
RBC # BLD AUTO: 2.09 M/UL (ref 4.1–5.7)
RBC # BLD AUTO: 2.2 M/UL (ref 4.1–5.7)
RBC MORPH BLD: ABNORMAL
SODIUM SERPL-SCNC: 137 MMOL/L (ref 136–145)
SODIUM UR-SCNC: 42 MMOL/L
SPECIMEN HOLD: NORMAL
WBC # BLD AUTO: 1.3 K/UL (ref 4.1–11.1)
WBC # BLD AUTO: 2.5 K/UL (ref 4.1–11.1)
WBC MORPH BLD: ABNORMAL

## 2025-07-16 PROCEDURE — 30233N1 TRANSFUSION OF NONAUTOLOGOUS RED BLOOD CELLS INTO PERIPHERAL VEIN, PERCUTANEOUS APPROACH: ICD-10-PCS

## 2025-07-16 PROCEDURE — P9073 PLATELETS PHERESIS PATH REDU: HCPCS

## 2025-07-16 PROCEDURE — 84300 ASSAY OF URINE SODIUM: CPT

## 2025-07-16 PROCEDURE — 87340 HEPATITIS B SURFACE AG IA: CPT

## 2025-07-16 PROCEDURE — 85027 COMPLETE CBC AUTOMATED: CPT

## 2025-07-16 PROCEDURE — 87389 HIV-1 AG W/HIV-1&-2 AB AG IA: CPT

## 2025-07-16 PROCEDURE — 99285 EMERGENCY DEPT VISIT HI MDM: CPT

## 2025-07-16 PROCEDURE — 6360000002 HC RX W HCPCS: Performed by: NURSE PRACTITIONER

## 2025-07-16 PROCEDURE — 99223 1ST HOSP IP/OBS HIGH 75: CPT | Performed by: FAMILY MEDICINE

## 2025-07-16 PROCEDURE — 2500000003 HC RX 250 WO HCPCS

## 2025-07-16 PROCEDURE — 36430 TRANSFUSION BLD/BLD COMPNT: CPT

## 2025-07-16 PROCEDURE — 86901 BLOOD TYPING SEROLOGIC RH(D): CPT

## 2025-07-16 PROCEDURE — 86704 HEP B CORE ANTIBODY TOTAL: CPT

## 2025-07-16 PROCEDURE — 30233R1 TRANSFUSION OF NONAUTOLOGOUS PLATELETS INTO PERIPHERAL VEIN, PERCUTANEOUS APPROACH: ICD-10-PCS

## 2025-07-16 PROCEDURE — 86850 RBC ANTIBODY SCREEN: CPT

## 2025-07-16 PROCEDURE — 6370000000 HC RX 637 (ALT 250 FOR IP): Performed by: NURSE PRACTITIONER

## 2025-07-16 PROCEDURE — 80053 COMPREHEN METABOLIC PANEL: CPT

## 2025-07-16 PROCEDURE — 83735 ASSAY OF MAGNESIUM: CPT

## 2025-07-16 PROCEDURE — 85025 COMPLETE CBC W/AUTO DIFF WBC: CPT

## 2025-07-16 PROCEDURE — 2580000003 HC RX 258

## 2025-07-16 PROCEDURE — 86923 COMPATIBILITY TEST ELECTRIC: CPT

## 2025-07-16 PROCEDURE — 6370000000 HC RX 637 (ALT 250 FOR IP)

## 2025-07-16 PROCEDURE — 86900 BLOOD TYPING SEROLOGIC ABO: CPT

## 2025-07-16 PROCEDURE — 86803 HEPATITIS C AB TEST: CPT

## 2025-07-16 PROCEDURE — 2060000000 HC ICU INTERMEDIATE R&B

## 2025-07-16 PROCEDURE — 86706 HEP B SURFACE ANTIBODY: CPT

## 2025-07-16 PROCEDURE — 82570 ASSAY OF URINE CREATININE: CPT

## 2025-07-16 PROCEDURE — 36415 COLL VENOUS BLD VENIPUNCTURE: CPT

## 2025-07-16 RX ORDER — PANTOPRAZOLE SODIUM 40 MG/1
40 TABLET, DELAYED RELEASE ORAL
Status: DISCONTINUED | OUTPATIENT
Start: 2025-07-16 | End: 2025-07-21 | Stop reason: HOSPADM

## 2025-07-16 RX ORDER — METHOTREXATE 2.5 MG/1
15 TABLET ORAL WEEKLY
Status: DISCONTINUED | OUTPATIENT
Start: 2025-07-16 | End: 2025-07-16

## 2025-07-16 RX ORDER — SODIUM CHLORIDE 9 MG/ML
INJECTION, SOLUTION INTRAVENOUS PRN
Status: DISCONTINUED | OUTPATIENT
Start: 2025-07-16 | End: 2025-07-16

## 2025-07-16 RX ORDER — ONDANSETRON 4 MG/1
4 TABLET, ORALLY DISINTEGRATING ORAL EVERY 8 HOURS PRN
Status: DISCONTINUED | OUTPATIENT
Start: 2025-07-16 | End: 2025-07-21 | Stop reason: HOSPADM

## 2025-07-16 RX ORDER — SODIUM CHLORIDE 9 MG/ML
INJECTION, SOLUTION INTRAVENOUS PRN
Status: DISCONTINUED | OUTPATIENT
Start: 2025-07-16 | End: 2025-07-18

## 2025-07-16 RX ORDER — ONDANSETRON 2 MG/ML
4 INJECTION INTRAMUSCULAR; INTRAVENOUS EVERY 6 HOURS PRN
Status: DISCONTINUED | OUTPATIENT
Start: 2025-07-16 | End: 2025-07-21 | Stop reason: HOSPADM

## 2025-07-16 RX ORDER — AMLODIPINE BESYLATE 5 MG/1
5 TABLET ORAL DAILY
Status: DISCONTINUED | OUTPATIENT
Start: 2025-07-16 | End: 2025-07-21 | Stop reason: HOSPADM

## 2025-07-16 RX ORDER — DEXAMETHASONE 4 MG/1
40 TABLET ORAL
Qty: 40 TABLET | Refills: 0 | Status: ON HOLD | OUTPATIENT
Start: 2025-07-16

## 2025-07-16 RX ORDER — SODIUM CHLORIDE, SODIUM LACTATE, POTASSIUM CHLORIDE, CALCIUM CHLORIDE 600; 310; 30; 20 MG/100ML; MG/100ML; MG/100ML; MG/100ML
INJECTION, SOLUTION INTRAVENOUS CONTINUOUS
Status: DISPENSED | OUTPATIENT
Start: 2025-07-16 | End: 2025-07-17

## 2025-07-16 RX ORDER — CYANOCOBALAMIN 1000 UG/ML
1000 INJECTION, SOLUTION INTRAMUSCULAR; SUBCUTANEOUS DAILY
Status: DISCONTINUED | OUTPATIENT
Start: 2025-07-16 | End: 2025-07-21 | Stop reason: HOSPADM

## 2025-07-16 RX ORDER — SODIUM CHLORIDE 0.9 % (FLUSH) 0.9 %
5-40 SYRINGE (ML) INJECTION EVERY 12 HOURS SCHEDULED
Status: DISCONTINUED | OUTPATIENT
Start: 2025-07-16 | End: 2025-07-21 | Stop reason: HOSPADM

## 2025-07-16 RX ORDER — ACETAMINOPHEN 650 MG/1
650 SUPPOSITORY RECTAL EVERY 6 HOURS PRN
Status: DISCONTINUED | OUTPATIENT
Start: 2025-07-16 | End: 2025-07-21 | Stop reason: HOSPADM

## 2025-07-16 RX ORDER — METHOTREXATE 2.5 MG/1
15 TABLET ORAL WEEKLY
Status: DISCONTINUED | OUTPATIENT
Start: 2025-07-22 | End: 2025-07-21 | Stop reason: HOSPADM

## 2025-07-16 RX ORDER — FOLIC ACID 1 MG/1
1 TABLET ORAL DAILY
Status: DISCONTINUED | OUTPATIENT
Start: 2025-07-16 | End: 2025-07-21 | Stop reason: HOSPADM

## 2025-07-16 RX ORDER — LOSARTAN POTASSIUM 50 MG/1
100 TABLET ORAL DAILY
Status: DISCONTINUED | OUTPATIENT
Start: 2025-07-16 | End: 2025-07-21 | Stop reason: HOSPADM

## 2025-07-16 RX ORDER — SODIUM CHLORIDE 0.9 % (FLUSH) 0.9 %
5-40 SYRINGE (ML) INJECTION PRN
Status: DISCONTINUED | OUTPATIENT
Start: 2025-07-16 | End: 2025-07-21 | Stop reason: HOSPADM

## 2025-07-16 RX ORDER — POLYETHYLENE GLYCOL 3350 17 G/17G
17 POWDER, FOR SOLUTION ORAL DAILY PRN
Status: DISCONTINUED | OUTPATIENT
Start: 2025-07-16 | End: 2025-07-21 | Stop reason: HOSPADM

## 2025-07-16 RX ORDER — ACETAMINOPHEN 325 MG/1
650 TABLET ORAL EVERY 6 HOURS PRN
Status: DISCONTINUED | OUTPATIENT
Start: 2025-07-16 | End: 2025-07-21 | Stop reason: HOSPADM

## 2025-07-16 RX ADMIN — FOLIC ACID 1 MG: 1 TABLET ORAL at 14:02

## 2025-07-16 RX ADMIN — CYANOCOBALAMIN 1000 MCG: 1000 INJECTION, SOLUTION INTRAMUSCULAR; SUBCUTANEOUS at 14:02

## 2025-07-16 RX ADMIN — AMLODIPINE BESYLATE 5 MG: 5 TABLET ORAL at 14:02

## 2025-07-16 RX ADMIN — SODIUM CHLORIDE, SODIUM LACTATE, POTASSIUM CHLORIDE, AND CALCIUM CHLORIDE: .6; .31; .03; .02 INJECTION, SOLUTION INTRAVENOUS at 13:52

## 2025-07-16 RX ADMIN — SODIUM CHLORIDE, PRESERVATIVE FREE 10 ML: 5 INJECTION INTRAVENOUS at 22:58

## 2025-07-16 RX ADMIN — PANTOPRAZOLE SODIUM 40 MG: 40 TABLET, DELAYED RELEASE ORAL at 14:02

## 2025-07-16 RX ADMIN — DEXAMETHASONE 40 MG: 6 TABLET ORAL at 15:18

## 2025-07-16 ASSESSMENT — PAIN - FUNCTIONAL ASSESSMENT: PAIN_FUNCTIONAL_ASSESSMENT: NONE - DENIES PAIN

## 2025-07-16 NOTE — CONSENT
Informed Consent for Blood Component Transfusion Note    I have discussed with the patient the rationale for blood component transfusion; its benefits in treating or preventing fatigue, organ damage, or death; and its risk which includes mild transfusion reactions, rare risk of blood borne infection, or more serious but rare reactions. I have discussed the alternatives to transfusion, including the risk and consequences of not receiving transfusion. The patient had an opportunity to ask questions and had agreed to proceed with transfusion of blood components.    Electronically signed by Ann Schoeneweis, APRN - NP on 7/16/25 at 1:14 PM EDT

## 2025-07-16 NOTE — ED PROVIDER NOTES
[facility] EMERGENCY DEPARTMENT  EMERGENCY DEPARTMENT ENCOUNTER      Pt Name: Joe Kaur  MRN: [id number]  Birthdate 1955  Date of evaluation: 7/16/2025  Provider: Santhosh Dockery MD    CHIEF COMPLAINT       Chief Complaint   Patient presents with    ABNormal labs         HISTORY OF PRESENT ILLNESS   (Location/Symptom, Timing/Onset, Context/Setting, Quality, Duration, Modifying Factors, Severity)  Note limiting factors.   Patient presents from home with reports of low platelet count.  Labs drawn yesterday showed a platelet count of 7.  This has been declining.  Patient was seen here a week ago with low platelet count and was admitted vies to be admitted at that time for further treatment.  He declined due to his upcoming birthday.  He is now in agreement with admission.  Review of EMR shows a history of rheumatoid arthritis, eczema, renal cell cancer, GERD, hypertension, PE.  Patient reportedly off blood thinning medication at this time.    The history is provided by the patient and medical records.         Review of External Medical Records:     Nursing Notes were reviewed.    REVIEW OF SYSTEMS    (2-9 systems for level 4, 10 or more for level 5)     Review of Systems   Constitutional:  Negative for fatigue.   HENT:  Negative for sore throat.    Eyes:  Negative for visual disturbance.   Respiratory:  Negative for shortness of breath.    Cardiovascular:  Negative for palpitations.   Gastrointestinal:  Negative for constipation.   Genitourinary:  Negative for difficulty urinating.   Musculoskeletal:  Negative for myalgias.   Skin:  Negative for rash.       Except as noted above the remainder of the review of systems was reviewed and negative.       PAST MEDICAL HISTORY     Past Medical History:   Diagnosis Date    Acute pulmonary embolism with acute cor pulmonale (HCC) 07/13/2024    BPH (benign prostatic hypertrophy) 11/19/2012    Cellulitis of left leg 07/13/2024    2/2 venous ulcer  No

## 2025-07-16 NOTE — ED NOTES
TRANSFER - OUT REPORT:    Verbal report given to Zeinab on Joe Kaur  being transferred to [facility] for routine progression of patient care       Report consisted of patient's Situation, Background, Assessment and   Recommendations(SBAR).     Information from the following report(s) Nurse Handoff Report, Index, ED Encounter Summary, ED SBAR, Adult Overview, Surgery Report, Intake/Output, MAR, Recent Results, Quality Measures, Neuro Assessment, and Event Log was reviewed with the receiving nurse.    [place] Fall Assessment:    Presents to emergency department  because of falls (Syncope, seizure, or loss of consciousness): No  Age > 70: Yes  Altered Mental Status, Intoxication with alcohol or substance confusion (Disorientation, impaired judgment, poor safety awaremess, or inability to follow instructions): No  Impaired Mobility: Ambulates or transfers with assistive devices or assistance; Unable to ambulate or transer.: No  Nursing Judgement: No          Lines:   Peripheral IV 07/16/25 Left Antecubital (Active)        Opportunity for questions and clarification was provided.      Patient transported with:  Registered Nurse and Tech

## 2025-07-16 NOTE — TELEPHONE ENCOUNTER
Received phone call at 8:18 am from Giuliana at [facility] to report Critical lab, Platelets 7 as of yesterday at 11:30.  Lab value reported to Nery Parikh NP.

## 2025-07-16 NOTE — CONSULTS
Cancer [place] at [facility]  [address], [address] [facility] [address]  W: [phone]  F: [phone]      Reason for Visit:   Joe Kaur is a 70 y.o. male who is seen today for evaluation of pancytopenia     History of Present Illness:   Joe Kaur is a pleasant 70 y.o. male who was admitted on 7/16/ for abnormal labs.   Pt seen in clinic on 7/16 for thrombocytopenia; labs drawn   Pt denies any signs of bleeding.   Agreeable to admission.       No family at bedside.     Review of systems was obtained and pertinent findings reviewed above. Past medical history, social history, family history, medications, and allergies are located in the electronic medical record.    Physical Exam:   Visit Vitals  /79   Pulse 61   Temp 97.9 °F (36.6 °C) (Oral)   Resp 18   Ht 1.778 m (5' 10\")   Wt 84.4 kg (186 lb)   SpO2 99%   BMI 26.69 kg/m²     ECOG PS: 0  General: no distress  Eyes: anicteric sclerae  HENT: oropharynx clear  Neck: supple  Lymphatic: no cervical, supraclavicular, or inguinal adenopathy  Respiratory: normal respiratory effort  CV: no peripheral edema  GI: soft, nontender, nondistended, no masses  Skin: no rashes; no ecchymoses; no petechiae    Results:     Lab Results   Component Value Date    WBC 2.5 (L) 07/16/2025    HGB 8.3 (L) 07/16/2025    HCT 23.1 (L) 07/16/2025    PLT 5 (LL) 07/16/2025    .0 (H) 07/16/2025    NEUTROABS 0.95 (L) 07/16/2025     Lab Results   Component Value Date     07/16/2025    K 3.8 07/16/2025     (H) 07/16/2025    CO2 23 07/16/2025    GLUCOSE 93 07/16/2025    BUN 20 07/16/2025    CREATININE 1.55 (H) 07/16/2025    LABGLOM 47 (L) 07/16/2025    CALCIUM 8.3 (L) 07/16/2025    MG 1.6 07/16/2025     Lab Results   Component Value Date    BILITOT 0.8 07/16/2025    ALT 45 07/16/2025    AST 38 (H) 07/16/2025    ALKPHOS 83 07/16/2025    PROTEIN 7.3 07/16/2025    ALBUMIN 2.8 (L) 07/16/2025    GLOB 4.5 (H) 07/16/2025    LABGLOB

## 2025-07-16 NOTE — ED TRIAGE NOTES
Pt presents to ED today after receiving phone call from his doctor that he needs to come to ED because he has low platelets. History of Renal Cancer. Pt denies pain. He has no current complaints.

## 2025-07-16 NOTE — TELEPHONE ENCOUNTER
[facility] [place] at [facility] [place]  [phone]    Called pt in regards to CBC results. PLT resulted 7. Advised patient to go to ED as soon as possible. Pt requests to come in to office instead, discussed why his platelet level requires urgent care and that he is at increased risk for bleeding.     Pt denies s/s bleeding. He agrees to go to ED.     When asked if he started dexamethasone prescribed yesterday, he states the pharmacy was unable to fill it.       Signed By: MOHIT Benitez CNP   7/16/25 at 8:31 AM EDT         Pharmacy called. Unable to fill 40 mg daily (20 mg tabs) due to supply. 20 mg tabs unavailable and also not covered by insurance. New prescription sent.       Signed By: MOHIT Benitez CNP   7/16/25 at 10:08 AM EDT

## 2025-07-16 NOTE — H&P
[id number] [id number]   Office [phone]  Fax [phone]       Admission H&P     Name: Joe Kaur MRN: [id number]  Sex: Male   YOB: 1955  Age: 70 y.o.  PCP: Ochoa Wisdom MD     Source of Information: patient, medical records    Chief complaint: Pancytopenia    History of Present Illness  Joe Kaur is a 70 y.o. male with known history of HTN, rheumatoid arthritis, CAD, BPH, MGUS, DVT, GERD, HLD, Hx of RCC who presents to the ER after being sent from his hematologist (Dr. Marquez) for platelets of 7.  A routine office visit with his rheumatologist they found his platelets were low to 36 roughly 1 week ago.  He presented on July 11 to the ER where he was seen by hematologist who recommended admission at that time.  Patient declined though because of his birthday the next day and had a close follow-up with them in the outpatient setting.  On recheck on July 14 his platelets were 12, and yesterday there were 7, prompting recommendation for admission.    At this time patient denies any symptoms of SOB, lightheadedness, headache, palpitations, melena, hematochezia, or bleeding from the gums. He does report bruising lesions of the BLEs for several weeks without reported injury to the area. He reports that he just recently started folic acid and B12.    In the ER:      Vitals:  Patient Vitals for the past 8 hrs:   Temp Pulse Resp BP SpO2   07/16/25 1336 97.9 °F (36.6 °C) 61 18 132/79 99 %   07/16/25 1245 97.8 °F (36.6 °C) 67 18 137/80 100 %   07/16/25 1147 97.9 °F (36.6 °C) 70 -- 139/83 100 %         Labs:   Recent Labs     07/15/25  1132 07/16/25  1151   WBC 3.8* 2.5*   HGB 8.3* 8.3*   HCT 23.7* 23.1*   PLT 7* 5*     Recent Labs     07/16/25  1151      K 3.8   *   CO2 23   BUN 20   MG 1.6     Recent Labs     07/16/25  1151   GLOB 4.5*     No results for input(s): \"INR\", \"APTT\" in the last 72 hours.    Invalid input(s): \"PTP\"   Invalid input(s):

## 2025-07-16 NOTE — CONSENT
Informed Consent for Blood Component Transfusion Note    I have discussed with the patient the rationale for blood component transfusion; its benefits in treating or preventing fatigue, organ damage, or death; and its risk which includes mild transfusion reactions, rare risk of blood borne infection, or more serious but rare reactions. I have discussed the alternatives to transfusion, including the risk and consequences of not receiving transfusion. The patient had an opportunity to ask questions and had agreed to proceed with transfusion of blood components.    Electronically signed by Freddie Alvarez MD on 7/16/25 at 1:27 PM EDT

## 2025-07-17 LAB
ALBUMIN SERPL-MCNC: 2.5 G/DL (ref 3.5–5)
ALBUMIN/GLOB SERPL: 0.6 (ref 1.1–2.2)
ALP SERPL-CCNC: 77 U/L (ref 45–117)
ALT SERPL-CCNC: 43 U/L (ref 12–78)
ANION GAP SERPL CALC-SCNC: 7 MMOL/L (ref 2–12)
AST SERPL-CCNC: 29 U/L (ref 15–37)
BASOPHILS # BLD: 0 K/UL (ref 0–0.1)
BASOPHILS NFR BLD: 0 % (ref 0–1)
BILIRUB SERPL-MCNC: 0.5 MG/DL (ref 0.2–1)
BLD PROD TYP BPU: NORMAL
BLOOD BANK BLOOD PRODUCT EXPIRATION DATE: NORMAL
BLOOD BANK DISPENSE STATUS: NORMAL
BLOOD BANK ISBT PRODUCT BLOOD TYPE: 6200
BLOOD BANK PRODUCT CODE: NORMAL
BLOOD BANK UNIT TYPE AND RH: NORMAL
BPU ID: NORMAL
BUN SERPL-MCNC: 22 MG/DL (ref 6–20)
BUN/CREAT SERPL: 14 (ref 12–20)
CALCIUM SERPL-MCNC: 8.3 MG/DL (ref 8.5–10.1)
CHLORIDE SERPL-SCNC: 108 MMOL/L (ref 97–108)
CO2 SERPL-SCNC: 21 MMOL/L (ref 21–32)
CREAT SERPL-MCNC: 1.59 MG/DL (ref 0.7–1.3)
DIFFERENTIAL METHOD BLD: ABNORMAL
EOSINOPHIL # BLD: 0.01 K/UL (ref 0–0.4)
EOSINOPHIL NFR BLD: 1 % (ref 0–7)
ERYTHROCYTE [DISTWIDTH] IN BLOOD BY AUTOMATED COUNT: 12 % (ref 11.5–14.5)
FERRITIN SERPL-MCNC: 884 NG/ML (ref 26–388)
GLOBULIN SER CALC-MCNC: 4.3 G/DL (ref 2–4)
GLUCOSE SERPL-MCNC: 180 MG/DL (ref 65–100)
HBV SURFACE AB SER QL: REACTIVE
HBV SURFACE AB SER-ACNC: >1000 MIU/ML
HBV SURFACE AG SER QL: <0.1 INDEX
HBV SURFACE AG SER QL: NEGATIVE
HCT VFR BLD AUTO: 20.4 % (ref 36.6–50.3)
HCV AB SER IA-ACNC: 0.02 INDEX
HCV AB SERPL QL IA: NONREACTIVE
HGB BLD-MCNC: 7.2 G/DL (ref 12.1–17)
HIV 1+2 AB+HIV1 P24 AG SERPL QL IA: NONREACTIVE
HIV 1/2 RESULT COMMENT: NORMAL
IMM GRANULOCYTES # BLD AUTO: 0 K/UL
IMM GRANULOCYTES NFR BLD AUTO: 0 %
IRON SATN MFR SERPL: 97 % (ref 20–50)
IRON SERPL-MCNC: 175 UG/DL (ref 35–150)
LYMPHOCYTES # BLD: 0.26 K/UL (ref 0.8–3.5)
LYMPHOCYTES NFR BLD: 20 % (ref 12–49)
MCH RBC QN AUTO: 37.3 PG (ref 26–34)
MCHC RBC AUTO-ENTMCNC: 35.3 G/DL (ref 30–36.5)
MCV RBC AUTO: 105.7 FL (ref 80–99)
MONOCYTES # BLD: 0 K/UL (ref 0–1)
MONOCYTES NFR BLD: 0 % (ref 5–13)
NEUTS SEG # BLD: 1.03 K/UL (ref 1.8–8)
NEUTS SEG NFR BLD: 79 % (ref 32–75)
NRBC # BLD: 0 K/UL (ref 0–0.01)
NRBC BLD-RTO: 0 PER 100 WBC
PLATELET # BLD AUTO: 15 K/UL (ref 150–400)
PMV BLD AUTO: 9.4 FL (ref 8.9–12.9)
POTASSIUM SERPL-SCNC: 4.4 MMOL/L (ref 3.5–5.1)
PROT SERPL-MCNC: 6.8 G/DL (ref 6.4–8.2)
RBC # BLD AUTO: 1.93 M/UL (ref 4.1–5.7)
RBC MORPH BLD: ABNORMAL
SODIUM SERPL-SCNC: 136 MMOL/L (ref 136–145)
TIBC SERPL-MCNC: 181 UG/DL (ref 250–450)
UNIT DIVISION: 0
UNIT ISSUE DATE/TIME: NORMAL
WBC # BLD AUTO: 1.3 K/UL (ref 4.1–11.1)

## 2025-07-17 PROCEDURE — 85025 COMPLETE CBC W/AUTO DIFF WBC: CPT

## 2025-07-17 PROCEDURE — 80053 COMPREHEN METABOLIC PANEL: CPT

## 2025-07-17 PROCEDURE — 83550 IRON BINDING TEST: CPT

## 2025-07-17 PROCEDURE — 6370000000 HC RX 637 (ALT 250 FOR IP)

## 2025-07-17 PROCEDURE — 2060000000 HC ICU INTERMEDIATE R&B

## 2025-07-17 PROCEDURE — 6360000002 HC RX W HCPCS: Performed by: NURSE PRACTITIONER

## 2025-07-17 PROCEDURE — 2580000003 HC RX 258

## 2025-07-17 PROCEDURE — 94761 N-INVAS EAR/PLS OXIMETRY MLT: CPT

## 2025-07-17 PROCEDURE — 6370000000 HC RX 637 (ALT 250 FOR IP): Performed by: NURSE PRACTITIONER

## 2025-07-17 PROCEDURE — 83540 ASSAY OF IRON: CPT

## 2025-07-17 PROCEDURE — 82728 ASSAY OF FERRITIN: CPT

## 2025-07-17 PROCEDURE — 2500000003 HC RX 250 WO HCPCS

## 2025-07-17 PROCEDURE — 99232 SBSQ HOSP IP/OBS MODERATE 35: CPT | Performed by: FAMILY MEDICINE

## 2025-07-17 PROCEDURE — 99223 1ST HOSP IP/OBS HIGH 75: CPT | Performed by: INTERNAL MEDICINE

## 2025-07-17 RX ADMIN — SODIUM CHLORIDE, SODIUM LACTATE, POTASSIUM CHLORIDE, AND CALCIUM CHLORIDE: .6; .31; .03; .02 INJECTION, SOLUTION INTRAVENOUS at 06:28

## 2025-07-17 RX ADMIN — SODIUM CHLORIDE, PRESERVATIVE FREE 10 ML: 5 INJECTION INTRAVENOUS at 21:38

## 2025-07-17 RX ADMIN — CYANOCOBALAMIN 1000 MCG: 1000 INJECTION, SOLUTION INTRAMUSCULAR; SUBCUTANEOUS at 11:04

## 2025-07-17 RX ADMIN — PANTOPRAZOLE SODIUM 40 MG: 40 TABLET, DELAYED RELEASE ORAL at 06:57

## 2025-07-17 RX ADMIN — DEXAMETHASONE 40 MG: 6 TABLET ORAL at 11:04

## 2025-07-17 RX ADMIN — FOLIC ACID 1 MG: 1 TABLET ORAL at 11:04

## 2025-07-17 RX ADMIN — AMLODIPINE BESYLATE 5 MG: 5 TABLET ORAL at 11:04

## 2025-07-18 ENCOUNTER — APPOINTMENT (OUTPATIENT)
Facility: HOSPITAL | Age: 70
DRG: 809 | End: 2025-07-18
Payer: MEDICARE

## 2025-07-18 LAB
ALBUMIN SERPL-MCNC: 2.6 G/DL (ref 3.5–5)
ALBUMIN/GLOB SERPL: 0.7 (ref 1.1–2.2)
ALP SERPL-CCNC: 70 U/L (ref 45–117)
ALT SERPL-CCNC: 55 U/L (ref 12–78)
ANION GAP SERPL CALC-SCNC: 7 MMOL/L (ref 2–12)
AST SERPL-CCNC: 40 U/L (ref 15–37)
BASOPHILS # BLD: 0 K/UL (ref 0–0.1)
BASOPHILS NFR BLD: 0 % (ref 0–1)
BILIRUB SERPL-MCNC: 0.4 MG/DL (ref 0.2–1)
BUN SERPL-MCNC: 28 MG/DL (ref 6–20)
BUN/CREAT SERPL: 20 (ref 12–20)
CALCIUM SERPL-MCNC: 8.7 MG/DL (ref 8.5–10.1)
CHLORIDE SERPL-SCNC: 109 MMOL/L (ref 97–108)
CO2 SERPL-SCNC: 22 MMOL/L (ref 21–32)
CREAT SERPL-MCNC: 1.43 MG/DL (ref 0.7–1.3)
DIFFERENTIAL METHOD BLD: ABNORMAL
EOSINOPHIL # BLD: 0 K/UL (ref 0–0.4)
EOSINOPHIL NFR BLD: 0 % (ref 0–7)
ERYTHROCYTE [DISTWIDTH] IN BLOOD BY AUTOMATED COUNT: 11.9 % (ref 11.5–14.5)
ERYTHROCYTE [DISTWIDTH] IN BLOOD BY AUTOMATED COUNT: 12.1 % (ref 11.5–14.5)
GLOBULIN SER CALC-MCNC: 3.7 G/DL (ref 2–4)
GLUCOSE SERPL-MCNC: 137 MG/DL (ref 65–100)
HAPTOGLOB SERPL-MCNC: 124 MG/DL (ref 30–200)
HBV CORE AB SERPL QL IA: POSITIVE
HCT VFR BLD AUTO: 19.5 % (ref 36.6–50.3)
HCT VFR BLD AUTO: 19.8 % (ref 36.6–50.3)
HGB BLD-MCNC: 7 G/DL (ref 12.1–17)
HGB BLD-MCNC: 7.1 G/DL (ref 12.1–17)
IMM GRANULOCYTES # BLD AUTO: 0 K/UL
IMM GRANULOCYTES NFR BLD AUTO: 0 %
LYMPHOCYTES # BLD: 0.38 K/UL (ref 0.8–3.5)
LYMPHOCYTES NFR BLD: 25 % (ref 12–49)
MCH RBC QN AUTO: 37.6 PG (ref 26–34)
MCH RBC QN AUTO: 38 PG (ref 26–34)
MCHC RBC AUTO-ENTMCNC: 35.9 G/DL (ref 30–36.5)
MCHC RBC AUTO-ENTMCNC: 35.9 G/DL (ref 30–36.5)
MCV RBC AUTO: 104.8 FL (ref 80–99)
MCV RBC AUTO: 105.9 FL (ref 80–99)
MONOCYTES # BLD: 0.02 K/UL (ref 0–1)
MONOCYTES NFR BLD: 1 % (ref 5–13)
NEUTS SEG # BLD: 1.1 K/UL (ref 1.8–8)
NEUTS SEG NFR BLD: 74 % (ref 32–75)
NRBC # BLD: 0 K/UL (ref 0–0.01)
NRBC # BLD: 0 K/UL (ref 0–0.01)
NRBC BLD-RTO: 0 PER 100 WBC
NRBC BLD-RTO: 0 PER 100 WBC
PLATELET # BLD AUTO: 13 K/UL (ref 150–400)
PLATELET # BLD AUTO: 8 K/UL (ref 150–400)
PMV BLD AUTO: 11 FL (ref 8.9–12.9)
PMV BLD AUTO: 9.1 FL (ref 8.9–12.9)
POTASSIUM SERPL-SCNC: 4.4 MMOL/L (ref 3.5–5.1)
PROT SERPL-MCNC: 6.3 G/DL (ref 6.4–8.2)
RBC # BLD AUTO: 1.86 M/UL (ref 4.1–5.7)
RBC # BLD AUTO: 1.87 M/UL (ref 4.1–5.7)
RBC MORPH BLD: ABNORMAL
RETICS # AUTO: 0.01 M/UL (ref 0.03–0.1)
RETICS/RBC NFR AUTO: 0.5 % (ref 0.7–2.1)
SODIUM SERPL-SCNC: 138 MMOL/L (ref 136–145)
WBC # BLD AUTO: 1.3 K/UL (ref 4.1–11.1)
WBC # BLD AUTO: 1.5 K/UL (ref 4.1–11.1)

## 2025-07-18 PROCEDURE — 6360000002 HC RX W HCPCS: Performed by: PHYSICIAN ASSISTANT

## 2025-07-18 PROCEDURE — 88313 SPECIAL STAINS GROUP 2: CPT

## 2025-07-18 PROCEDURE — 88341 IMHCHEM/IMCYTCHM EA ADD ANTB: CPT

## 2025-07-18 PROCEDURE — 07DR3ZX EXTRACTION OF ILIAC BONE MARROW, PERCUTANEOUS APPROACH, DIAGNOSTIC: ICD-10-PCS | Performed by: STUDENT IN AN ORGANIZED HEALTH CARE EDUCATION/TRAINING PROGRAM

## 2025-07-18 PROCEDURE — 94761 N-INVAS EAR/PLS OXIMETRY MLT: CPT

## 2025-07-18 PROCEDURE — 85027 COMPLETE CBC AUTOMATED: CPT

## 2025-07-18 PROCEDURE — 88342 IMHCHEM/IMCYTCHM 1ST ANTB: CPT

## 2025-07-18 PROCEDURE — 6370000000 HC RX 637 (ALT 250 FOR IP): Performed by: NURSE PRACTITIONER

## 2025-07-18 PROCEDURE — 83010 ASSAY OF HAPTOGLOBIN QUANT: CPT

## 2025-07-18 PROCEDURE — 2500000003 HC RX 250 WO HCPCS

## 2025-07-18 PROCEDURE — 88305 TISSUE EXAM BY PATHOLOGIST: CPT

## 2025-07-18 PROCEDURE — 77012 CT SCAN FOR NEEDLE BIOPSY: CPT

## 2025-07-18 PROCEDURE — 80053 COMPREHEN METABOLIC PANEL: CPT

## 2025-07-18 PROCEDURE — 36415 COLL VENOUS BLD VENIPUNCTURE: CPT

## 2025-07-18 PROCEDURE — 2060000000 HC ICU INTERMEDIATE R&B

## 2025-07-18 PROCEDURE — 6360000002 HC RX W HCPCS: Performed by: NURSE PRACTITIONER

## 2025-07-18 PROCEDURE — 2580000003 HC RX 258

## 2025-07-18 PROCEDURE — 6370000000 HC RX 637 (ALT 250 FOR IP)

## 2025-07-18 PROCEDURE — 85045 AUTOMATED RETICULOCYTE COUNT: CPT

## 2025-07-18 PROCEDURE — P9073 PLATELETS PHERESIS PATH REDU: HCPCS

## 2025-07-18 PROCEDURE — 36430 TRANSFUSION BLD/BLD COMPNT: CPT

## 2025-07-18 PROCEDURE — 99152 MOD SED SAME PHYS/QHP 5/>YRS: CPT

## 2025-07-18 PROCEDURE — 88311 DECALCIFY TISSUE: CPT

## 2025-07-18 PROCEDURE — 99232 SBSQ HOSP IP/OBS MODERATE 35: CPT | Performed by: FAMILY MEDICINE

## 2025-07-18 PROCEDURE — 99232 SBSQ HOSP IP/OBS MODERATE 35: CPT | Performed by: NURSE PRACTITIONER

## 2025-07-18 PROCEDURE — 88360 TUMOR IMMUNOHISTOCHEM/MANUAL: CPT

## 2025-07-18 PROCEDURE — 85025 COMPLETE CBC W/AUTO DIFF WBC: CPT

## 2025-07-18 RX ORDER — SODIUM CHLORIDE 9 MG/ML
INJECTION, SOLUTION INTRAVENOUS PRN
Status: DISCONTINUED | OUTPATIENT
Start: 2025-07-18 | End: 2025-07-21 | Stop reason: HOSPADM

## 2025-07-18 RX ORDER — SODIUM CHLORIDE 9 MG/ML
INJECTION, SOLUTION INTRAVENOUS CONTINUOUS
Status: DISCONTINUED | OUTPATIENT
Start: 2025-07-18 | End: 2025-07-19

## 2025-07-18 RX ORDER — MIDAZOLAM HYDROCHLORIDE 1 MG/ML
5 INJECTION, SOLUTION INTRAMUSCULAR; INTRAVENOUS PRN
Status: DISCONTINUED | OUTPATIENT
Start: 2025-07-18 | End: 2025-07-18 | Stop reason: ALTCHOICE

## 2025-07-18 RX ORDER — FENTANYL CITRATE 50 UG/ML
100 INJECTION, SOLUTION INTRAMUSCULAR; INTRAVENOUS PRN
Refills: 0 | Status: DISCONTINUED | OUTPATIENT
Start: 2025-07-18 | End: 2025-07-18 | Stop reason: ALTCHOICE

## 2025-07-18 RX ADMIN — SODIUM CHLORIDE: 9 INJECTION, SOLUTION INTRAVENOUS at 06:47

## 2025-07-18 RX ADMIN — SODIUM CHLORIDE: 9 INJECTION, SOLUTION INTRAVENOUS at 21:16

## 2025-07-18 RX ADMIN — FENTANYL CITRATE 25 MCG: 0.05 INJECTION, SOLUTION INTRAMUSCULAR; INTRAVENOUS at 13:01

## 2025-07-18 RX ADMIN — MIDAZOLAM 2 MG: 1 INJECTION INTRAMUSCULAR; INTRAVENOUS at 13:04

## 2025-07-18 RX ADMIN — FENTANYL CITRATE 25 MCG: 0.05 INJECTION, SOLUTION INTRAMUSCULAR; INTRAVENOUS at 13:04

## 2025-07-18 RX ADMIN — AMLODIPINE BESYLATE 5 MG: 5 TABLET ORAL at 09:02

## 2025-07-18 RX ADMIN — SODIUM CHLORIDE, PRESERVATIVE FREE 10 ML: 5 INJECTION INTRAVENOUS at 21:15

## 2025-07-18 RX ADMIN — DEXAMETHASONE 40 MG: 6 TABLET ORAL at 09:02

## 2025-07-18 RX ADMIN — FOLIC ACID 1 MG: 1 TABLET ORAL at 09:03

## 2025-07-18 RX ADMIN — FENTANYL CITRATE 25 MCG: 0.05 INJECTION, SOLUTION INTRAMUSCULAR; INTRAVENOUS at 13:07

## 2025-07-18 RX ADMIN — MIDAZOLAM 1 MG: 1 INJECTION INTRAMUSCULAR; INTRAVENOUS at 13:07

## 2025-07-18 RX ADMIN — PANTOPRAZOLE SODIUM 40 MG: 40 TABLET, DELAYED RELEASE ORAL at 06:50

## 2025-07-18 RX ADMIN — SODIUM CHLORIDE, PRESERVATIVE FREE 10 ML: 5 INJECTION INTRAVENOUS at 09:03

## 2025-07-18 RX ADMIN — MIDAZOLAM 1 MG: 1 INJECTION INTRAMUSCULAR; INTRAVENOUS at 13:01

## 2025-07-18 RX ADMIN — CYANOCOBALAMIN 1000 MCG: 1000 INJECTION, SOLUTION INTRAMUSCULAR; SUBCUTANEOUS at 09:03

## 2025-07-18 NOTE — PROCEDURES
PROCEDURE NOTE  Date: 7/18/2025   Name: Joe Kaur  YOB: 1955    Procedures    Interventional Radiology Brief Postoperative Note    Procedure Date:  7/18/2025    Procedure:  CT guided bone marrow biopsy    :  [facility]    Attending:  Sae Epperson MD    Preoperative Diagnosis:  Pancytopenia    Postoperative Diagnosis:  Pancytopenia    Complications:  none    Estimated Blood Loss:  less than 5cc    Procedure Findings:  Technically successful CT guided bone marrow biopsy.    Specimens:  5cc aspirate; 2cm core    Condition of Patient:  The patient tolerated the procedure without difficulty and remained in stable condition throughout.

## 2025-07-18 NOTE — PLAN OF CARE
Problem: Discharge Planning  Goal: Discharge to home or other facility with appropriate resources  Outcome: Progressing  Flowsheets (Taken 7/18/2025 0800)  Discharge to home or other facility with appropriate resources:   Identify barriers to discharge with patient and caregiver   Arrange for needed discharge resources and transportation as appropriate     Problem: ABCDS Injury Assessment  Goal: Absence of physical injury  Outcome: Progressing     Problem: Safety - Adult  Goal: Free from fall injury  Outcome: Progressing

## 2025-07-18 NOTE — H&P
INTERVENTIONAL RADIOLOGY  Preoperative History and Physical      Patient:  Joe Kaur  :  1955  Age:  70 y.o.  MRN:  [id number]  Today's Date:  2025      CC / HPI   Joe Kaur is a 70 y.o. male with a history of pancytopneia who presents for CT guided bone marrow biopsy.    PAST MEDICAL HISTORY  Past Medical History:   Diagnosis Date    Acute pulmonary embolism with acute cor pulmonale (HCC) 2024    BPH (benign prostatic hypertrophy) 2012    Cellulitis of left leg 2024    2/2 venous ulcer of the L leg from DVT.     Medication: Amoxicillin 500mg TID x 10 days (started 2024)  - patient thinks maybe a little better looking, no really sure. Needs me to look at it.      Eczema 2012    Elevated cholesterol 2012    GERD (gastroesophageal reflux disease) 2016    History of renal cell cancer 2012    Clear cell    HTN (hypertension) 2012    Iritis 2012    RA (rheumatoid arthritis) (HCC) 2012    Renal cell cancer (HCC) 2012    Venous stasis ulcer of left lower leg with edema of left lower leg ([facility]) 2024    2/2 acute DVT second time. Led to cellulitis.      Interval Hx:  - has been keeping leg elevated.  - hasn't been able to tolerate full compression wraps.         PAST SURGICAL HISTORY  Past Surgical History:   Procedure Laterality Date    KIDNEY REMOVAL         SOCIAL HISTORY  Social History     Socioeconomic History    Marital status: Single     Spouse name: Not on file    Number of children: Not on file    Years of education: Not on file    Highest education level: Not on file   Occupational History    Not on file   Tobacco Use    Smoking status: Never     Passive exposure: Never    Smokeless tobacco: Never   Vaping Use    Vaping status: Never Used   Substance and Sexual Activity    Alcohol use: Yes     Alcohol/week: 8.0 - 9.0 standard drinks of alcohol    Drug use: No    Sexual activity: Yes     Partners: Female   Other

## 2025-07-19 LAB
ALBUMIN SERPL-MCNC: 2.4 G/DL (ref 3.5–5)
ALBUMIN/GLOB SERPL: 0.7 (ref 1.1–2.2)
ALP SERPL-CCNC: 67 U/L (ref 45–117)
ALT SERPL-CCNC: 55 U/L (ref 12–78)
ANION GAP SERPL CALC-SCNC: 7 MMOL/L (ref 2–12)
AST SERPL-CCNC: 33 U/L (ref 15–37)
BASOPHILS # BLD: 0 K/UL (ref 0–0.1)
BASOPHILS NFR BLD: 0 % (ref 0–1)
BILIRUB SERPL-MCNC: 0.4 MG/DL (ref 0.2–1)
BLD PROD TYP BPU: NORMAL
BLOOD BANK BLOOD PRODUCT EXPIRATION DATE: NORMAL
BLOOD BANK DISPENSE STATUS: NORMAL
BLOOD BANK ISBT PRODUCT BLOOD TYPE: 5100
BLOOD BANK PRODUCT CODE: NORMAL
BLOOD BANK UNIT TYPE AND RH: NORMAL
BPU ID: NORMAL
BUN SERPL-MCNC: 26 MG/DL (ref 6–20)
BUN/CREAT SERPL: 19 (ref 12–20)
CALCIUM SERPL-MCNC: 7.9 MG/DL (ref 8.5–10.1)
CHLORIDE SERPL-SCNC: 110 MMOL/L (ref 97–108)
CO2 SERPL-SCNC: 21 MMOL/L (ref 21–32)
CREAT SERPL-MCNC: 1.36 MG/DL (ref 0.7–1.3)
DIFFERENTIAL METHOD BLD: ABNORMAL
EOSINOPHIL # BLD: 0 K/UL (ref 0–0.4)
EOSINOPHIL NFR BLD: 0 % (ref 0–7)
ERYTHROCYTE [DISTWIDTH] IN BLOOD BY AUTOMATED COUNT: 12.2 % (ref 11.5–14.5)
ERYTHROCYTE [DISTWIDTH] IN BLOOD BY AUTOMATED COUNT: 13.5 % (ref 11.5–14.5)
GLOBULIN SER CALC-MCNC: 3.6 G/DL (ref 2–4)
GLUCOSE SERPL-MCNC: 137 MG/DL (ref 65–100)
HCT VFR BLD AUTO: 17.6 % (ref 36.6–50.3)
HCT VFR BLD AUTO: 22.8 % (ref 36.6–50.3)
HGB BLD-MCNC: 6.3 G/DL (ref 12.1–17)
HGB BLD-MCNC: 8.1 G/DL (ref 12.1–17)
HISTORY CHECK: NORMAL
IMM GRANULOCYTES # BLD AUTO: 0 K/UL (ref 0–0.04)
IMM GRANULOCYTES NFR BLD AUTO: 0 % (ref 0–0.5)
LYMPHOCYTES # BLD: 0.53 K/UL (ref 0.8–3.5)
LYMPHOCYTES NFR BLD: 48 % (ref 12–49)
MCH RBC QN AUTO: 37.3 PG (ref 26–34)
MCH RBC QN AUTO: 37.5 PG (ref 26–34)
MCHC RBC AUTO-ENTMCNC: 35.5 G/DL (ref 30–36.5)
MCHC RBC AUTO-ENTMCNC: 35.8 G/DL (ref 30–36.5)
MCV RBC AUTO: 104.8 FL (ref 80–99)
MCV RBC AUTO: 105.1 FL (ref 80–99)
MONOCYTES # BLD: 0 K/UL (ref 0–1)
MONOCYTES NFR BLD: 0 % (ref 5–13)
NEUTS SEG # BLD: 0.57 K/UL (ref 1.8–8)
NEUTS SEG NFR BLD: 52 % (ref 32–75)
NRBC # BLD: 0 K/UL (ref 0–0.01)
NRBC # BLD: 0 K/UL (ref 0–0.01)
NRBC BLD-RTO: 0 PER 100 WBC
NRBC BLD-RTO: 0 PER 100 WBC
PLATELET # BLD AUTO: 30 K/UL (ref 150–400)
PLATELET # BLD AUTO: 9 K/UL (ref 150–400)
PLATELET COMMENT: ABNORMAL
PMV BLD AUTO: 10.7 FL (ref 8.9–12.9)
POTASSIUM SERPL-SCNC: 4.2 MMOL/L (ref 3.5–5.1)
PROT SERPL-MCNC: 6 G/DL (ref 6.4–8.2)
RBC # BLD AUTO: 1.68 M/UL (ref 4.1–5.7)
RBC # BLD AUTO: 2.17 M/UL (ref 4.1–5.7)
RBC MORPH BLD: ABNORMAL
RBC MORPH BLD: ABNORMAL
SODIUM SERPL-SCNC: 138 MMOL/L (ref 136–145)
UNIT DIVISION: 0
UNIT ISSUE DATE/TIME: NORMAL
WBC # BLD AUTO: 1.1 K/UL (ref 4.1–11.1)
WBC # BLD AUTO: 1.2 K/UL (ref 4.1–11.1)

## 2025-07-19 PROCEDURE — 85025 COMPLETE CBC W/AUTO DIFF WBC: CPT

## 2025-07-19 PROCEDURE — P9016 RBC LEUKOCYTES REDUCED: HCPCS

## 2025-07-19 PROCEDURE — 36430 TRANSFUSION BLD/BLD COMPNT: CPT

## 2025-07-19 PROCEDURE — 6370000000 HC RX 637 (ALT 250 FOR IP)

## 2025-07-19 PROCEDURE — 6360000002 HC RX W HCPCS: Performed by: NURSE PRACTITIONER

## 2025-07-19 PROCEDURE — 94761 N-INVAS EAR/PLS OXIMETRY MLT: CPT

## 2025-07-19 PROCEDURE — 36415 COLL VENOUS BLD VENIPUNCTURE: CPT

## 2025-07-19 PROCEDURE — 2060000000 HC ICU INTERMEDIATE R&B

## 2025-07-19 PROCEDURE — 99232 SBSQ HOSP IP/OBS MODERATE 35: CPT | Performed by: FAMILY MEDICINE

## 2025-07-19 PROCEDURE — 85027 COMPLETE CBC AUTOMATED: CPT

## 2025-07-19 PROCEDURE — 2500000003 HC RX 250 WO HCPCS

## 2025-07-19 PROCEDURE — 80053 COMPREHEN METABOLIC PANEL: CPT

## 2025-07-19 PROCEDURE — 99232 SBSQ HOSP IP/OBS MODERATE 35: CPT | Performed by: INTERNAL MEDICINE

## 2025-07-19 PROCEDURE — P9073 PLATELETS PHERESIS PATH REDU: HCPCS

## 2025-07-19 PROCEDURE — 6370000000 HC RX 637 (ALT 250 FOR IP): Performed by: NURSE PRACTITIONER

## 2025-07-19 RX ORDER — SODIUM CHLORIDE 9 MG/ML
INJECTION, SOLUTION INTRAVENOUS PRN
Status: DISCONTINUED | OUTPATIENT
Start: 2025-07-19 | End: 2025-07-21 | Stop reason: HOSPADM

## 2025-07-19 RX ORDER — SODIUM CHLORIDE 9 MG/ML
INJECTION, SOLUTION INTRAVENOUS PRN
Status: DISCONTINUED | OUTPATIENT
Start: 2025-07-19 | End: 2025-07-19

## 2025-07-19 RX ADMIN — CYANOCOBALAMIN 1000 MCG: 1000 INJECTION, SOLUTION INTRAMUSCULAR; SUBCUTANEOUS at 09:53

## 2025-07-19 RX ADMIN — SODIUM CHLORIDE, PRESERVATIVE FREE 10 ML: 5 INJECTION INTRAVENOUS at 09:55

## 2025-07-19 RX ADMIN — PANTOPRAZOLE SODIUM 40 MG: 40 TABLET, DELAYED RELEASE ORAL at 09:54

## 2025-07-19 RX ADMIN — DEXAMETHASONE 40 MG: 6 TABLET ORAL at 09:54

## 2025-07-19 RX ADMIN — FOLIC ACID 1 MG: 1 TABLET ORAL at 09:54

## 2025-07-19 NOTE — PLAN OF CARE
Problem: Discharge Planning  Goal: Discharge to home or other facility with appropriate resources  7/19/2025 0032 by Lalitha Tyler RN  Outcome: Progressing  7/18/2025 1512 by Shebly Contreras RN  Outcome: Progressing  Flowsheets (Taken 7/18/2025 0800)  Discharge to home or other facility with appropriate resources:   Identify barriers to discharge with patient and caregiver   Arrange for needed discharge resources and transportation as appropriate     Problem: ABCDS Injury Assessment  Goal: Absence of physical injury  7/19/2025 0032 by Lalitha Tyler RN  Outcome: Progressing  7/18/2025 1512 by Shelby Contreras RN  Outcome: Progressing     Problem: Safety - Adult  Goal: Free from fall injury  7/19/2025 0032 by Lalitha Tyler RN  Outcome: Progressing  7/18/2025 1512 by Shelby Contreras, RN  Outcome: Progressing

## 2025-07-19 NOTE — PLAN OF CARE
Problem: Discharge Planning  Goal: Discharge to home or other facility with appropriate resources  Outcome: Progressing  Flowsheets (Taken 7/19/2025 0800)  Discharge to home or other facility with appropriate resources:   Identify barriers to discharge with patient and caregiver   Arrange for needed discharge resources and transportation as appropriate     Problem: ABCDS Injury Assessment  Goal: Absence of physical injury  Outcome: Progressing     Problem: Safety - Adult  Goal: Free from fall injury  Outcome: Progressing

## 2025-07-20 PROBLEM — D75.89 BONE MARROW SUPPRESSION: Status: ACTIVE | Noted: 2025-07-20

## 2025-07-20 PROBLEM — D70.8 OTHER NEUTROPENIA: Status: ACTIVE | Noted: 2025-07-20

## 2025-07-20 LAB
ABO + RH BLD: NORMAL
ALBUMIN SERPL-MCNC: 2.6 G/DL (ref 3.5–5)
ALBUMIN/GLOB SERPL: 0.7 (ref 1.1–2.2)
ALP SERPL-CCNC: 63 U/L (ref 45–117)
ALT SERPL-CCNC: 58 U/L (ref 12–78)
ANION GAP SERPL CALC-SCNC: 9 MMOL/L (ref 2–12)
AST SERPL-CCNC: 26 U/L (ref 15–37)
BASOPHILS # BLD: 0 K/UL (ref 0–0.1)
BASOPHILS NFR BLD: 0 % (ref 0–1)
BILIRUB SERPL-MCNC: 0.7 MG/DL (ref 0.2–1)
BLD PROD TYP BPU: NORMAL
BLD PROD TYP BPU: NORMAL
BLOOD BANK BLOOD PRODUCT EXPIRATION DATE: NORMAL
BLOOD BANK BLOOD PRODUCT EXPIRATION DATE: NORMAL
BLOOD BANK DISPENSE STATUS: NORMAL
BLOOD BANK DISPENSE STATUS: NORMAL
BLOOD BANK ISBT PRODUCT BLOOD TYPE: 5100
BLOOD BANK ISBT PRODUCT BLOOD TYPE: 6200
BLOOD BANK PRODUCT CODE: NORMAL
BLOOD BANK PRODUCT CODE: NORMAL
BLOOD BANK UNIT TYPE AND RH: NORMAL
BLOOD BANK UNIT TYPE AND RH: NORMAL
BLOOD GROUP ANTIBODIES SERPL: NORMAL
BPU ID: NORMAL
BPU ID: NORMAL
BUN SERPL-MCNC: 31 MG/DL (ref 6–20)
BUN/CREAT SERPL: 22 (ref 12–20)
CALCIUM SERPL-MCNC: 8.3 MG/DL (ref 8.5–10.1)
CHLORIDE SERPL-SCNC: 108 MMOL/L (ref 97–108)
CO2 SERPL-SCNC: 22 MMOL/L (ref 21–32)
CREAT SERPL-MCNC: 1.43 MG/DL (ref 0.7–1.3)
CROSSMATCH RESULT: NORMAL
DIFFERENTIAL METHOD BLD: ABNORMAL
EOSINOPHIL # BLD: 0 K/UL (ref 0–0.4)
EOSINOPHIL NFR BLD: 0 % (ref 0–7)
ERYTHROCYTE [DISTWIDTH] IN BLOOD BY AUTOMATED COUNT: 13.3 % (ref 11.5–14.5)
GLOBULIN SER CALC-MCNC: 3.6 G/DL (ref 2–4)
GLUCOSE SERPL-MCNC: 128 MG/DL (ref 65–100)
HCT VFR BLD AUTO: 20.5 % (ref 36.6–50.3)
HGB BLD-MCNC: 7.3 G/DL (ref 12.1–17)
IMM GRANULOCYTES # BLD AUTO: 0 K/UL
IMM GRANULOCYTES NFR BLD AUTO: 0 %
LYMPHOCYTES # BLD: 0.38 K/UL (ref 0.8–3.5)
LYMPHOCYTES NFR BLD: 42 % (ref 12–49)
MCH RBC QN AUTO: 36.9 PG (ref 26–34)
MCHC RBC AUTO-ENTMCNC: 35.6 G/DL (ref 30–36.5)
MCV RBC AUTO: 103.5 FL (ref 80–99)
MONOCYTES # BLD: 0.02 K/UL (ref 0–1)
MONOCYTES NFR BLD: 2 % (ref 5–13)
NEUTS SEG # BLD: 0.5 K/UL (ref 1.8–8)
NEUTS SEG NFR BLD: 56 % (ref 32–75)
NRBC # BLD: 0 K/UL (ref 0–0.01)
NRBC BLD-RTO: 0 PER 100 WBC
PLATELET # BLD AUTO: 18 K/UL (ref 150–400)
PMV BLD AUTO: 10.9 FL (ref 8.9–12.9)
POTASSIUM SERPL-SCNC: 4.1 MMOL/L (ref 3.5–5.1)
PROT SERPL-MCNC: 6.2 G/DL (ref 6.4–8.2)
RBC # BLD AUTO: 1.98 M/UL (ref 4.1–5.7)
RBC MORPH BLD: ABNORMAL
SODIUM SERPL-SCNC: 139 MMOL/L (ref 136–145)
SPECIMEN EXP DATE BLD: NORMAL
UNIT DIVISION: 0
UNIT DIVISION: 0
UNIT ISSUE DATE/TIME: NORMAL
UNIT ISSUE DATE/TIME: NORMAL
WBC # BLD AUTO: 0.9 K/UL (ref 4.1–11.1)

## 2025-07-20 PROCEDURE — 6370000000 HC RX 637 (ALT 250 FOR IP)

## 2025-07-20 PROCEDURE — 99232 SBSQ HOSP IP/OBS MODERATE 35: CPT | Performed by: FAMILY MEDICINE

## 2025-07-20 PROCEDURE — 80053 COMPREHEN METABOLIC PANEL: CPT

## 2025-07-20 PROCEDURE — 94761 N-INVAS EAR/PLS OXIMETRY MLT: CPT

## 2025-07-20 PROCEDURE — 99232 SBSQ HOSP IP/OBS MODERATE 35: CPT | Performed by: INTERNAL MEDICINE

## 2025-07-20 PROCEDURE — 6360000002 HC RX W HCPCS: Performed by: NURSE PRACTITIONER

## 2025-07-20 PROCEDURE — 2500000003 HC RX 250 WO HCPCS

## 2025-07-20 PROCEDURE — 85025 COMPLETE CBC W/AUTO DIFF WBC: CPT

## 2025-07-20 PROCEDURE — 2060000000 HC ICU INTERMEDIATE R&B

## 2025-07-20 RX ADMIN — SODIUM CHLORIDE, PRESERVATIVE FREE 10 ML: 5 INJECTION INTRAVENOUS at 08:33

## 2025-07-20 RX ADMIN — FOLIC ACID 1 MG: 1 TABLET ORAL at 08:32

## 2025-07-20 RX ADMIN — PANTOPRAZOLE SODIUM 40 MG: 40 TABLET, DELAYED RELEASE ORAL at 05:29

## 2025-07-20 RX ADMIN — AMLODIPINE BESYLATE 5 MG: 5 TABLET ORAL at 08:32

## 2025-07-20 RX ADMIN — CYANOCOBALAMIN 1000 MCG: 1000 INJECTION, SOLUTION INTRAMUSCULAR; SUBCUTANEOUS at 08:32

## 2025-07-21 VITALS
HEART RATE: 53 BPM | RESPIRATION RATE: 18 BRPM | SYSTOLIC BLOOD PRESSURE: 124 MMHG | TEMPERATURE: 97.7 F | DIASTOLIC BLOOD PRESSURE: 73 MMHG | BODY MASS INDEX: 26.63 KG/M2 | HEIGHT: 70 IN | WEIGHT: 186 LBS | OXYGEN SATURATION: 100 %

## 2025-07-21 LAB
ANION GAP SERPL CALC-SCNC: 6 MMOL/L (ref 2–12)
BASOPHILS # BLD: 0 K/UL (ref 0–0.1)
BASOPHILS NFR BLD: 0 % (ref 0–1)
BUN SERPL-MCNC: 30 MG/DL (ref 6–20)
BUN/CREAT SERPL: 21 (ref 12–20)
CALCIUM SERPL-MCNC: 8.4 MG/DL (ref 8.5–10.1)
CHLORIDE SERPL-SCNC: 110 MMOL/L (ref 97–108)
CO2 SERPL-SCNC: 23 MMOL/L (ref 21–32)
COMMENT:: NORMAL
COMMENT:: NORMAL
CREAT SERPL-MCNC: 1.41 MG/DL (ref 0.7–1.3)
DIFFERENTIAL METHOD BLD: ABNORMAL
EOSINOPHIL # BLD: 0 K/UL (ref 0–0.4)
EOSINOPHIL NFR BLD: 0 % (ref 0–7)
ERYTHROCYTE [DISTWIDTH] IN BLOOD BY AUTOMATED COUNT: 13.2 % (ref 11.5–14.5)
GLUCOSE SERPL-MCNC: 132 MG/DL (ref 65–100)
HCT VFR BLD AUTO: 20.1 % (ref 36.6–50.3)
HGB BLD-MCNC: 7.3 G/DL (ref 12.1–17)
IMM GRANULOCYTES # BLD AUTO: 0 K/UL
IMM GRANULOCYTES NFR BLD AUTO: 0 %
LYMPHOCYTES # BLD: 1.31 K/UL (ref 0.8–3.5)
LYMPHOCYTES NFR BLD: 77 % (ref 12–49)
MCH RBC QN AUTO: 37.4 PG (ref 26–34)
MCHC RBC AUTO-ENTMCNC: 36.3 G/DL (ref 30–36.5)
MCV RBC AUTO: 103.1 FL (ref 80–99)
MONOCYTES # BLD: 0.03 K/UL (ref 0–1)
MONOCYTES NFR BLD: 2 % (ref 5–13)
NEUTS SEG # BLD: 0.36 K/UL (ref 1.8–8)
NEUTS SEG NFR BLD: 21 % (ref 32–75)
NRBC # BLD: 0.02 K/UL (ref 0–0.01)
NRBC BLD-RTO: 1.2 PER 100 WBC
PLATELET # BLD AUTO: 12 K/UL (ref 150–400)
PMV BLD AUTO: 9.7 FL (ref 8.9–12.9)
POTASSIUM SERPL-SCNC: 4.1 MMOL/L (ref 3.5–5.1)
RBC # BLD AUTO: 1.95 M/UL (ref 4.1–5.7)
RBC MORPH BLD: ABNORMAL
SODIUM SERPL-SCNC: 139 MMOL/L (ref 136–145)
SPECIMEN HOLD: NORMAL
SPECIMEN HOLD: NORMAL
WBC # BLD AUTO: 1.7 K/UL (ref 4.1–11.1)

## 2025-07-21 PROCEDURE — 85025 COMPLETE CBC W/AUTO DIFF WBC: CPT

## 2025-07-21 PROCEDURE — 80048 BASIC METABOLIC PNL TOTAL CA: CPT

## 2025-07-21 PROCEDURE — P9073 PLATELETS PHERESIS PATH REDU: HCPCS

## 2025-07-21 PROCEDURE — 99238 HOSP IP/OBS DSCHRG MGMT 30/<: CPT | Performed by: FAMILY MEDICINE

## 2025-07-21 PROCEDURE — 99232 SBSQ HOSP IP/OBS MODERATE 35: CPT | Performed by: INTERNAL MEDICINE

## 2025-07-21 PROCEDURE — 2500000003 HC RX 250 WO HCPCS

## 2025-07-21 PROCEDURE — 6360000002 HC RX W HCPCS: Performed by: NURSE PRACTITIONER

## 2025-07-21 PROCEDURE — 36415 COLL VENOUS BLD VENIPUNCTURE: CPT

## 2025-07-21 PROCEDURE — 36430 TRANSFUSION BLD/BLD COMPNT: CPT

## 2025-07-21 PROCEDURE — 6370000000 HC RX 637 (ALT 250 FOR IP)

## 2025-07-21 RX ORDER — CYANOCOBALAMIN 1000 UG/ML
1000 INJECTION, SOLUTION INTRAMUSCULAR; SUBCUTANEOUS DAILY
Qty: 1 ML | Refills: 0 | Status: SHIPPED | OUTPATIENT
Start: 2025-07-22 | End: 2025-07-23

## 2025-07-21 RX ORDER — SODIUM CHLORIDE 0.9 % (FLUSH) 0.9 %
5-40 SYRINGE (ML) INJECTION PRN
Status: CANCELLED
Start: 2025-07-23

## 2025-07-21 RX ORDER — CYANOCOBALAMIN 1000 UG/ML
1000 INJECTION, SOLUTION INTRAMUSCULAR; SUBCUTANEOUS
Qty: 4 ML | Refills: 0 | Status: SHIPPED | OUTPATIENT
Start: 2025-07-21 | End: 2025-08-12

## 2025-07-21 RX ORDER — SODIUM CHLORIDE 9 MG/ML
INJECTION, SOLUTION INTRAVENOUS PRN
Status: DISCONTINUED | OUTPATIENT
Start: 2025-07-21 | End: 2025-07-21 | Stop reason: HOSPADM

## 2025-07-21 RX ADMIN — FOLIC ACID 1 MG: 1 TABLET ORAL at 08:30

## 2025-07-21 RX ADMIN — AMLODIPINE BESYLATE 5 MG: 5 TABLET ORAL at 08:31

## 2025-07-21 RX ADMIN — CYANOCOBALAMIN 1000 MCG: 1000 INJECTION, SOLUTION INTRAMUSCULAR; SUBCUTANEOUS at 08:30

## 2025-07-21 RX ADMIN — PANTOPRAZOLE SODIUM 40 MG: 40 TABLET, DELAYED RELEASE ORAL at 08:30

## 2025-07-21 RX ADMIN — SODIUM CHLORIDE, PRESERVATIVE FREE 10 ML: 5 INJECTION INTRAVENOUS at 08:31

## 2025-07-21 NOTE — PROGRESS NOTES
July 21, 2025       Joe Kaur YOB: 1955   [address] [address] Date of Visit:  7/16/2025       To Whom It May Concern:    Joe Kaur was admitted at [facility] from July 16-21, 2025.    It is my medical opinion that Joe Kaur should remain out of work until July 23, 2025, pending being seen by his specialist.    If you have any questions or concerns, please don't hesitate to call.    Sincerely,      Nichole Car MD          
     [facility] [place] at [facility]  [address], [address] [facility] [address]  W: [phone]  F: [phone] Patient ID  Name: Joe Kaur  YOB: 1955  MRN: [id number]  Referring Provider:   No referring provider defined for this encounter.  Primary Care Provider:   Ochoa Wisdom MD       HEMATOLOGY/MEDICAL ONCOLOGY  NOTE     Reason for Evaluation:     Chief Complaint   Patient presents with    ABNormal labs       Subjective:     History of Present Illness:   Date: 07/21/25     Joe Kaur is a 70 y.o. male who presents as a DAILY hospital follow-up for THROMBOCYTOPENIA.   --- denies nay bleeding.   --- reports no physical complaints    Past Medical History:   Diagnosis Date    Acute pulmonary embolism with acute cor pulmonale (HCC) 07/13/2024    BPH (benign prostatic hypertrophy) 11/19/2012    Cellulitis of left leg 07/13/2024    2/2 venous ulcer of the L leg from DVT.     Medication: Amoxicillin 500mg TID x 10 days (started 07/14/2024)  - patient thinks maybe a little better looking, no really sure. Needs me to look at it.      Eczema 11/19/2012    Elevated cholesterol 11/19/2012    GERD (gastroesophageal reflux disease) 11/11/2016    History of renal cell cancer 11/19/2012    Clear cell    HTN (hypertension) 11/19/2012    Iritis 11/19/2012    RA (rheumatoid arthritis) (HCC) 11/19/2012    Renal cell cancer (HCC) 11/19/2012    Venous stasis ulcer of left lower leg with edema of left lower leg ([facility]) 07/13/2024    2/2 acute DVT second time. Led to cellulitis.      Interval Hx:  - has been keeping leg elevated.  - hasn't been able to tolerate full compression wraps.        Past Surgical History:   Procedure Laterality Date    KIDNEY REMOVAL        Social History     Tobacco Use    Smoking status: Never     Passive exposure: Never    Smokeless tobacco: Never   Substance Use Topics    Alcohol use: Yes     Alcohol/week: 8.0 - 9.0 standard drinks of alcohol    
  [address] [id number]   Office [phone]  Fax [phone]          Subjective / Objective     Subjective  Overnight Events: pt with plts 9, Hb 6.3, 1unit plts and prbc's ordered  Patient seen and examined at bedside. Receiving transfusion at time of interview. No complaints. No bleeding or bruising. Denies CP, SOB, NVD.     Respiratory:   On room air  Vitals:    07/19/25 0506   BP: 124/69   Pulse: 61   Resp: 17   Temp: 97.7 °F (36.5 °C)   SpO2: 100%     Physical Examination:   General appearance - alert, well appearing, and in no distress  Chest - clear to auscultation, no wheezes, rales or rhonchi, symmetric air entry  Heart - normal rate, regular rhythm, normal S1, S2, no murmurs, rubs, clicks or gallops,   Abdomen - soft, nontender, nondistended, no masses or organomegaly  Neurological - alert, oriented, normal speech, no focal findings  Skin - warm, dry. No notable rashes. Bruise at antecubital phlebotomy site  Extremities - peripheral pulses normal, no pedal edema. Bruising of BL shins unchanged  Psychiatric - normal speech and thought processes    I/O:  07/18 0701 - 07/19 0700  In: 643   Out: -   Inpatient Medications  @[email or web address]  Current Facility-Administered Medications   Medication Dose Route Frequency    0.9 % sodium chloride infusion   IntraVENous PRN    0.9 % sodium chloride infusion   IntraVENous PRN    0.9 % sodium chloride infusion   IntraVENous Continuous    amLODIPine (NORVASC) tablet 5 mg  5 mg Oral Daily    pantoprazole (PROTONIX) tablet 40 mg  40 mg Oral QAM AC    folic acid (FOLVITE) tablet 1 mg  1 mg Oral Daily    [Held by provider] losartan (COZAAR) tablet 100 mg  100 mg Oral Daily    sodium chloride flush 0.9 % injection 5-40 mL  5-40 mL IntraVENous 2 times per day    sodium chloride flush 0.9 % injection 5-40 mL  5-40 mL IntraVENous PRN    ondansetron (ZOFRAN-ODT) disintegrating tablet 4 mg  4 mg Oral Q8H PRN    Or    ondansetron (ZOFRAN) injection 4 mg  4 mg 
  [address] [id number]   Office [phone]  Fax [phone]          Subjective / Objective     Subjective  Overnight Events: pt transfused 1 unit platelets  Patient seen and examined at bedside. Reports feeling well this AM and wanting to play tennis. Denies CP, SOB, N/V, dysuria.    Respiratory:   On room air  Vitals:    07/17/25 0720   BP: 138/80   Pulse: 65   Resp: 16   Temp: 97.9 °F (36.6 °C)   SpO2: 100%     Physical Examination:   General appearance - alert, well appearing, and in no distress  Chest - clear to auscultation, no wheezes, rales or rhonchi, symmetric air entry  Heart - normal rate, regular rhythm, normal S1, S2, no murmurs, rubs, clicks or gallops,   Abdomen - soft, nontender, nondistended, no masses or organomegaly  Neurological - alert, oriented, normal speech, no focal findings  Skin - warm, dry. No notable rashes. Bruise at antecubital phlebotomy site  Extremities - peripheral pulses normal, no pedal edema, no clubbing or cyanosis  Psychiatric - normal speech and thought processes    I/O:  No intake/output data recorded.  Inpatient Medications  [unfilled]  Current Facility-Administered Medications   Medication Dose Route Frequency   • amLODIPine (NORVASC) tablet 5 mg  5 mg Oral Daily   • pantoprazole (PROTONIX) tablet 40 mg  40 mg Oral QAM AC   • folic acid (FOLVITE) tablet 1 mg  1 mg Oral Daily   • [Held by provider] losartan (COZAAR) tablet 100 mg  100 mg Oral Daily   • sodium chloride flush 0.9 % injection 5-40 mL  5-40 mL IntraVENous 2 times per day   • sodium chloride flush 0.9 % injection 5-40 mL  5-40 mL IntraVENous PRN   • 0.9 % sodium chloride infusion   IntraVENous PRN   • ondansetron (ZOFRAN-ODT) disintegrating tablet 4 mg  4 mg Oral Q8H PRN    Or   • ondansetron (ZOFRAN) injection 4 mg  4 mg IntraVENous Q6H PRN   • polyethylene glycol (GLYCOLAX) packet 17 g  17 g Oral Daily PRN   • acetaminophen (TYLENOL) tablet 650 mg  650 mg Oral Q6H PRN    Or   • 
  [address] [id number]   Office [phone]  Fax [phone]          Subjective / Objective     Subjective  Overnight Events: NAEO    Patient seen and examined at bedside. No complaints. No bleeding or bruising. Denies CP, SOB.     Respiratory:   On room air  Vitals:    07/20/25 0826   BP: 136/77   Pulse: 57   Resp: 16   Temp: 98.2 °F (36.8 °C)   SpO2: 99%     Physical Examination:   General appearance - alert, well appearing, and in no distress  Chest - clear to auscultation, no wheezes, rales or rhonchi, symmetric air entry  Heart - normal rate, regular rhythm, normal S1, S2, no murmurs, rubs, clicks or gallops,   Neurological - alert, normal speech, no focal findings  Skin - warm, dry. No notable rashes.   Extremities - no pedal edema.   Psychiatric - normal speech and thought processes    I/O:  07/19 0701 - 07/20 0700  In: 764.4   Out: -   Inpatient Medications    Current Facility-Administered Medications   Medication Dose Route Frequency    0.9 % sodium chloride infusion   IntraVENous PRN    0.9 % sodium chloride infusion   IntraVENous PRN    amLODIPine (NORVASC) tablet 5 mg  5 mg Oral Daily    pantoprazole (PROTONIX) tablet 40 mg  40 mg Oral QAM AC    folic acid (FOLVITE) tablet 1 mg  1 mg Oral Daily    [Held by provider] losartan (COZAAR) tablet 100 mg  100 mg Oral Daily    sodium chloride flush 0.9 % injection 5-40 mL  5-40 mL IntraVENous 2 times per day    sodium chloride flush 0.9 % injection 5-40 mL  5-40 mL IntraVENous PRN    ondansetron (ZOFRAN-ODT) disintegrating tablet 4 mg  4 mg Oral Q8H PRN    Or    ondansetron (ZOFRAN) injection 4 mg  4 mg IntraVENous Q6H PRN    polyethylene glycol (GLYCOLAX) packet 17 g  17 g Oral Daily PRN    acetaminophen (TYLENOL) tablet 650 mg  650 mg Oral Q6H PRN    Or    acetaminophen (TYLENOL) suppository 650 mg  650 mg Rectal Q6H PRN    cyanocobalamin injection 1,000 mcg  1,000 mcg IntraMUSCular Daily    [Held by provider] methotrexate 
  [address] [place] [address]   Office [phone]  Fax [phone]          Subjective / Objective     Subjective  Overnight Events: NAEO    Patient seen and examined at bedside. Reports sleeping well. No complaints. No bleeding or bruising. Denies CP, SOB.     Respiratory:   On room air  Vitals:    07/21/25 0712   BP: 138/87   Pulse: 63   Resp: 18   Temp: 97.9 °F (36.6 °C)   SpO2: 99%     Physical Examination:   General appearance - alert, well appearing, and in no distress  Chest - clear to auscultation, no wheezes, rales or rhonchi, symmetric air entry  Heart - normal rate, regular rhythm, normal S1, S2, no murmurs, rubs, clicks or gallops,   Neurological - alert, normal speech, no focal findings  Skin - warm, dry. No notable rashes.   Extremities - no pedal edema. Unchanged bruising on b/l shins  Psychiatric - normal speech and thought processes    I/O:  07/20 0701 - 07/21 0700  In: 880 [P.O.:880]  Out: -   Inpatient Medications    Current Facility-Administered Medications   Medication Dose Route Frequency    0.9 % sodium chloride infusion   IntraVENous PRN    0.9 % sodium chloride infusion   IntraVENous PRN    amLODIPine (NORVASC) tablet 5 mg  5 mg Oral Daily    pantoprazole (PROTONIX) tablet 40 mg  40 mg Oral QAM AC    folic acid (FOLVITE) tablet 1 mg  1 mg Oral Daily    [Held by provider] losartan (COZAAR) tablet 100 mg  100 mg Oral Daily    sodium chloride flush 0.9 % injection 5-40 mL  5-40 mL IntraVENous 2 times per day    sodium chloride flush 0.9 % injection 5-40 mL  5-40 mL IntraVENous PRN    ondansetron (ZOFRAN-ODT) disintegrating tablet 4 mg  4 mg Oral Q8H PRN    Or    ondansetron (ZOFRAN) injection 4 mg  4 mg IntraVENous Q6H PRN    polyethylene glycol (GLYCOLAX) packet 17 g  17 g Oral Daily PRN    acetaminophen (TYLENOL) tablet 650 mg  650 mg Oral Q6H PRN    Or    acetaminophen (TYLENOL) suppository 650 mg  650 mg Rectal Q6H PRN    cyanocobalamin injection 1,000 mcg  1,000 mcg 
  [address] [id number]   Office [phone]  Fax [phone]          Subjective / Objective     Subjective  Overnight Events: pt with plts 8, 1unit ordered; however, not given as of 3h after order. Per nursing, platelets not released.  Patient seen and examined at bedside. No complaints. No bleeding or bruising. Denies CP, SOB, NVD.     Respiratory:   On room air  Vitals:    07/18/25 0728   BP: 116/78   Pulse: 58   Resp: 17   Temp: 97.7 °F (36.5 °C)   SpO2: 99%     Physical Examination:   General appearance - alert, well appearing, and in no distress  Chest - clear to auscultation, no wheezes, rales or rhonchi, symmetric air entry  Heart - normal rate, regular rhythm, normal S1, S2, no murmurs, rubs, clicks or gallops,   Abdomen - soft, nontender, nondistended, no masses or organomegaly  Neurological - alert, oriented, normal speech, no focal findings  Skin - warm, dry. No notable rashes. Bruise at antecubital phlebotomy site  Extremities - peripheral pulses normal, no pedal edema, no clubbing or cyanosis  Psychiatric - normal speech and thought processes    I/O:  No intake/output data recorded.  Inpatient Medications  [unfilled]  Current Facility-Administered Medications   Medication Dose Route Frequency   • 0.9 % sodium chloride infusion   IntraVENous PRN   • 0.9 % sodium chloride infusion   IntraVENous Continuous   • amLODIPine (NORVASC) tablet 5 mg  5 mg Oral Daily   • pantoprazole (PROTONIX) tablet 40 mg  40 mg Oral QAM AC   • folic acid (FOLVITE) tablet 1 mg  1 mg Oral Daily   • [Held by provider] losartan (COZAAR) tablet 100 mg  100 mg Oral Daily   • sodium chloride flush 0.9 % injection 5-40 mL  5-40 mL IntraVENous 2 times per day   • sodium chloride flush 0.9 % injection 5-40 mL  5-40 mL IntraVENous PRN   • ondansetron (ZOFRAN-ODT) disintegrating tablet 4 mg  4 mg Oral Q8H PRN    Or   • ondansetron (ZOFRAN) injection 4 mg  4 mg IntraVENous Q6H PRN   • polyethylene glycol 
  [facility] [place] at [facility]  [address], [address] [facility] [address]  W: [phone]  F: [phone]      Reason for Visit:   Joe Kaur is a 70 y.o. male who is seen today for evaluation of pancytopenia     History of Present Illness:   Joe Kaur is a pleasant 70 y.o. male who was admitted on 7/16/ for abnormal labs.   Pt seen in clinic on 7/16 for thrombocytopenia; labs drawn   Pt denies any signs of bleeding.   Agreeable to admission.   No family at bedside.     Interval History:    Sitting up in chair at bedside.  Denies any signs of bleeding.   Agrees to BM bx for tomorrow.   Shares he has been taking his MTX every Tues; but now stopped while in the hospital.      Review of systems was obtained and pertinent findings reviewed above. Past medical history, social history, family history, medications, and allergies are located in the electronic medical record.    Physical Exam:   Visit Vitals  BP (!) 145/84   Pulse 85   Temp 97.9 °F (36.6 °C) (Oral)   Resp 16   Ht 1.778 m (5' 10\")   Wt 84.4 kg (186 lb)   SpO2 100%   BMI 26.69 kg/m²     ECOG PS: 0  General: no distress  Eyes: anicteric sclerae  HENT: oropharynx clear  Neck: supple  Lymphatic: no cervical, supraclavicular, or inguinal adenopathy  Respiratory: normal respiratory effort  CV: no peripheral edema  GI: soft, nontender, nondistended, no masses  Skin: no rashes; no ecchymoses; no petechiae    Results:     Lab Results   Component Value Date    WBC 1.3 (L) 07/17/2025    HGB 7.2 (L) 07/17/2025    HCT 20.4 (L) 07/17/2025    PLT 15 (LL) 07/17/2025    .7 (H) 07/17/2025    NEUTROABS 1.03 (L) 07/17/2025     Lab Results   Component Value Date     07/17/2025    K 4.4 07/17/2025     07/17/2025    CO2 21 07/17/2025    GLUCOSE 180 (H) 07/17/2025    BUN 22 (H) 07/17/2025    CREATININE 1.59 (H) 07/17/2025    LABGLOM 46 (L) 07/17/2025    CALCIUM 8.3 (L) 07/17/2025    MG 1.6 07/16/2025     Lab Results 
  [facility] [place] at [facility]  [address], [address] [facility] [address]  W: [phone]  F: [phone]      Reason for Visit:   Joe Kaur is a 70 y.o. male who is seen today for evaluation of pancytopenia       Interval History:   He is receiving platelets during my visit. Denies bleeding.        Review of systems was obtained and pertinent findings reviewed above. Past medical history, social history, family history, medications, and allergies are located in the electronic medical record.    Physical Exam:   Visit Vitals  /81   Pulse 56   Temp 97.5 °F (36.4 °C) (Oral)   Resp 18   Ht 1.778 m (5' 10\")   Wt 84.4 kg (186 lb)   SpO2 100%   BMI 26.69 kg/m²     ECOG PS: 0  General: no distress  Respiratory: normal respiratory effort  CV: no peripheral edema  Skin: no rashes; no ecchymoses; no petechiae      Results:     Lab Results   Component Value Date    WBC 1.1 (L) 07/19/2025    HGB 6.3 (L) 07/19/2025    HCT 17.6 (LL) 07/19/2025    PLT 9 (LL) 07/19/2025    .8 (H) 07/19/2025    NEUTROABS 0.57 (L) 07/19/2025     Lab Results   Component Value Date     07/19/2025    K 4.2 07/19/2025     (H) 07/19/2025    CO2 21 07/19/2025    GLUCOSE 137 (H) 07/19/2025    BUN 26 (H) 07/19/2025    CREATININE 1.36 (H) 07/19/2025    LABGLOM 55 (L) 07/19/2025    CALCIUM 7.9 (L) 07/19/2025    MG 1.6 07/16/2025     Lab Results   Component Value Date    BILITOT 0.4 07/19/2025    ALT 55 07/19/2025    AST 33 07/19/2025    ALKPHOS 67 07/19/2025    PROTEIN 6.0 (L) 07/19/2025    ALBUMIN 2.4 (L) 07/19/2025    GLOB 3.6 07/19/2025    LABGLOB 3.9 07/27/2023     Lab Results   Component Value Date    RETICAUTO 0.5 (L) 07/18/2025    IRON 175 (H) 07/17/2025    TIBC 181 (L) 07/17/2025    IRONPERSAT 97 (H) 07/17/2025    FERRITIN 884 (H) 07/17/2025    PMHDYMAH92 200 07/11/2025    FOLATE 2.5 (L) 07/11/2025     (H) 09/19/2024    HAPTOGLOBIN 124 07/18/2025    DATPOLYSPECIFIC NEG 09/19/2024 
  [facility] [place] at [facility]  [address], [address] [facility] [address]  W: [phone]  F: [phone]      Reason for Visit:   Joe Kaur is a 70 y.o. male who is seen today for evaluation of pancytopenia     History of Present Illness:   Joe Kaur is a pleasant 70 y.o. male who was admitted on 7/16/ for abnormal labs.   Pt seen in clinic on 7/16 for thrombocytopenia; labs drawn   Pt denies any signs of bleeding.   Agreeable to admission.   No family at bedside.     Interval History:    Bx went well. No signs of bleeding  Hoping to be going home. Agrees to stay.     Daughter at bedside.     Review of systems was obtained and pertinent findings reviewed above. Past medical history, social history, family history, medications, and allergies are located in the electronic medical record.    Physical Exam:   Visit Vitals  /60   Pulse 71   Temp 97.3 °F (36.3 °C) (Axillary)   Resp 16   Ht 1.778 m (5' 10\")   Wt 84.4 kg (186 lb)   SpO2 100%   BMI 26.69 kg/m²     ECOG PS: 0  General: no distress  Eyes: anicteric sclerae  HENT: oropharynx clear  Neck: supple  Lymphatic: no cervical, supraclavicular, or inguinal adenopathy  Respiratory: normal respiratory effort  CV: no peripheral edema  GI: soft, nontender, nondistended, no masses  Skin: no rashes; no ecchymoses; no petechiae    Results:     Lab Results   Component Value Date    WBC 1.3 (L) 07/18/2025    HGB 7.1 (L) 07/18/2025    HCT 19.8 (L) 07/18/2025    PLT 13 (LL) 07/18/2025    .9 (H) 07/18/2025    NEUTROABS 1.10 (L) 07/18/2025     Lab Results   Component Value Date     07/18/2025    K 4.4 07/18/2025     (H) 07/18/2025    CO2 22 07/18/2025    GLUCOSE 137 (H) 07/18/2025    BUN 28 (H) 07/18/2025    CREATININE 1.43 (H) 07/18/2025    LABGLOM 52 (L) 07/18/2025    CALCIUM 8.7 07/18/2025    MG 1.6 07/16/2025     Lab Results   Component Value Date    BILITOT 0.4 07/18/2025    ALT 55 07/18/2025    AST 40 
0405: Lab called stating hgb was 6.3 and platelets were 9. Sent message to family practice resident.     0406: Orders are in to replace both.     0425: Lab called stating the blood is ready to be picked up but the platelets \"would take 2-3 hours until they're ready\" because they \"have to get the platelets from the [place].\"     0430: Attempted to get another iv since pt has fluids ordered. Pt refused. Attempted to explain benefits of having second iv. Pt refused. Second RN at bedside provided education. Pt agreed to second iv. Second iv started.     0451: Blood started with second RN.     0506: Blood started 15 minutes ago. This RN at bedside. Vss. No s&s of blood reaction. New iv went bad. Iv flushed fine and did not hurt pt, not warm to touch, but was swollen at the iv site. Iv removed. Pt refused for another iv to be placed.       
0428 Notified [facility] of Platelets of 12 and to review all AM labs. Per day shift nurse MD wanted to transfuse for platelets less than 18. No order for this. Asked if they could place order if transfusion needed.Reviewed chart with Leonardo Mcgill MD. Dr. Thomson (Oncology) wrote in note to transfuse platelets if less than 10. Dr. Mcgill in agreement. No new orders at this time.  
0700 : Bedside and Verbal shift change report given to Shirley RN (oncoming nurse) by Lalitha RN (offgoing nurse). Report included the following information Nurse Handoff Report, Recent Results, Cardiac Rhythm NSR / Ran[place]fo, and Event Log.       1900 : Bedside and Verbal shift change report given to Merry RN (oncoming nurse) by Shirley RN (offgoing nurse). Report included the following information Nurse Handoff Report, Recent Results, Cardiac Rhythm NSR / Ranulfo , and Event Log.     
0700 : Bedside and Verbal shift change report given to Shirley RN (oncoming nurse) by Myah RN (offgoing nurse). Report included the following information Nurse Handoff Report, Recent Results, Cardiac Rhythm NSR, and Event Log.       1900 : Bedside and Verbal shift change report given to Lalitha RN (oncoming nurse) by Shirley RN (offgoing nurse). Report included the following information Nurse Handoff Report, Recent Results, Cardiac Rhythm NSR, and Event Log.     
0700:Bedside and Verbal shift change report given to Clementina RN (oncoming nurse) by Anette RN (offgoing nurse). Report included the following information Nurse Handoff Report.     1455:Reviewed discharge paperwork with patient. Allowed time for questions and clarification of which none were noted. Agreeable to discharge. IV removed.      
1215  Patient arrived to radiology holding via stretcher for CT guided bone marrow biopsy. Verified name and . Patient is alert and oriented. Has been NPO since midnight. No complaints of pain. Allergies reviewed. LAC PIV patent. Attached patient to monitor. VSS on room air. Sinus rhythm.  1225  Provider GABI Dailey arrived to patient's  bedside for consent for CT guided bone marrow biopsy. Discussed risks and benefits of procedure as well as sedation plan. Consent obtained.  1245  CT ready for patient. Took patient via stretcher to CT. Patient positioned onto CT table prone. Attached to monitor/O2/fluids.  Time Out 1302. Start time 1302. Stop time 1310.  Patient received a total of Versed 4 mg and Fentanyl 75 mcg.  Dressing placed on left posterior iliac crest. Dressing CDI.  Patient tolerated procedure well. Vitals remained stable throughout.  1315  Took patient via stretcher back to radiology holding. Patient drowsy but responds to voice. Attached patient to monitor. VSS on room air. Sinus rhythm.  1330  Updated patient's nurse Shelby.  1405  Vitals have remained stable on room air. Patient awake/alert and oriented. Transport arrived to take patient via stretcher back to room 314.  
1305 TRANSFER - IN REPORT:    Verbal report received from Julissa PRASAD on Joe Kaur  being received from [facility] ED for routine progression of patient care      Report consisted of patient's Situation, Background, Assessment and   Recommendations(SBAR).     Information from the following report(s) Nurse Handoff Report, Intake/Output, MAR, Recent Results, Cardiac Rhythm NSR, Neuro Assessment, and Event Log was reviewed with the receiving nurse.    Opportunity for questions and clarification was provided.      1310 Verbal handoff given to SHANICE Vernon  
Bedside and Verbal shift change report given to SHANICE Chang (oncoming nurse) by SHANICE Sousa (offgoing nurse). Report included the following information Nurse Handoff Report, Index, and Adult Overview.     
Blood bank contacted for status of transfusion. Released at 7:58.   Ang Quezada MD  
      Rheumatoid arthritis  Methotrexate discontinued, usually takes weekly on Tuesdays. Hopefully the steroids will help to keep his disease controlled for now.  --Follow up with rheumatologist (Dr Zamora) as outpatient      Hx of DVT  Pt stopped taking Eliquis in April due to cost  --cont to hold for thrombocytopenia      Dispo  I do not recommend discharging him at this time, as he is likely to need additional platelet transfusion and pRBC transfusion within the next 24-48 hours, and we cannot accommodate that in the [facility]. Need to see his counts stabilize a bit more before we can safely discharge. If he leaves AMA again, he does have follow up with Dr. Marquez on Wednesday.    Over the weekend I am covering and available as needed at [phone].  Dr. Marquez will return to assume hematology/oncology care on Monday.      Signed By: Kirk Thomson MD

## 2025-07-21 NOTE — DISCHARGE INSTRUCTIONS
HOME DISCHARGE INSTRUCTIONS   Joe Kaur / [id number]                                    : 1955  Admission date: 2025                      Discharge date: 2025  _________________________________________________________________________    Please bring this form with you to show your care provider at your follow-up appointment.    Primary care provider: Ochoa Wisdom    Discharging provider: Nichole Car MD - Family Medicine Resident        Santhosh Salcedo MD - Family Medicine Attending      You have been admitted to the hospital with the following diagnoses:  ACUTE DIAGNOSES:  Pancytopenia (HCC) [D61.818]      You were hospitalized for pancytopenia or low levels of certain blood components. You were transfused with blood and although it is still low, it is stable. Please make sure to follow-up with hematology (Dr. Marquez) for continuing management.     You are now safe to be discharged. Please follow-up with your PCP for hospital discharge follow-up and management of your other medical conditions.     Below are new/changes to your medications:  Continue folic acid 1 mg daily   Continue vitamin B12 injection, 1 dose tomorrow, then weekly for 4 doses until instructed by Dr. Mraquez     PAUSE Losartan until seen by PCP    STOP METHOTREXATE       Follow-up with your PCP for the following:  - hospital discharge follow-up  - blood pressure management: resumption of losartan   - repeat CBC    Follow-up with your hematologist for the following:  - ongoing management of your pancytopenia   - B12 supplementation     Follow-up with your rheumatologist for the following:  - methotrexate alternative         . . . . . . . . . . . . . . . . . . . . . . . . . . . . . . . . . . . . . . . . . . . . . . . . . . . . . . . . . . . . . . . . . . . . . . . . . .   FOLLOW-UP CARE RECOMMENDATIONS:    APPOINTMENTS:  Future Appointments   Date Time Provider Department

## 2025-07-21 NOTE — CARE COORDINATION
CASE MANAGEMENT LOW RISK ADMISSION NOTE  7/17/2025  2:40 PM      ICD-10-CM    1. Pancytopenia (HCC)  D61.818           General Risk Score: 3   Values used to calculate this score:    Points  Metrics       1        Age: 70       1        Hospital Admissions: 1       1        ED Visits: 1       0        Has Chronic Obstructive Pulmonary Disease: No       0        Has Diabetes Excluding Gestational Diabetes: No       0        Has Congestive Heart Failure: No       0        Has Liver Disease: No       0        Has Depression: No       0        Current PCP: Ochoa Wisdom MD       0        Has Medicaid: No      Admit Date:7/16/2025 11:44 AM    Admission Status: inpatient    Providers consulted at this time: Oncology following.  Discussed in IDR, bleeding risk, monitoring platelets.  Sent by hematologist, Dr. Sales.      PT/OT consulted?: No   Discharge recommendations: no needs identified at this time.       CM reviewed EMR. No CM needs indicated at this time. Per IDR, estimated discharge date is 7/18 (sat). Providers, if needs arise, please consult case management.     Madonna Nolasco    
Address Payor Plan Phone Number Payor Plan Fax Number Effective Dates    [address] [address] [phone]  1/1/2025 - None Entered    [facility] [id number]         Subscriber Name Subscriber Birth Date Member ID       LEO CALLE 1955 [id number]                     PCP: Ochoa Wisdom MD   Address: [address] [place] [address] / [facility] [address]   Phone number: [phone]    Pharmacy:   [facility]/pharmacy #[id number] - [place], [place] - [address] - P [phone] - F [phone]  [address] [address]  Phone: [phone] Fax: [phone]    DC Transport: (P) Self       Transition of care plan:    []Unable to determine at this time. Awaiting clinical progress, and disposition recommendations.    [] Home. No assistance required.     [] Home. Pt refused recommended services.    [x] Home with family assistance as needed, and outpatient follow-up.    [] Home with Outpatient PT and outpatient follow-up   Pt aware of OP appt? []Yes, Provider:   []Not scheduled   Transport provider:     [] Home with outpatient services.    Specify:    [] Home with Home Health   - [place] [facility] offered? [] Yes, Preference:   [] NA    []SNF/IPR   -[]Freedom of Choice offered, and preferences given:   []Listing provided and preferences requested   -Status: []Pending []Accepted:    -Auth required: []Yes []No    -Auth initiated date:   -3 midnight stay required: []Yes []No  Date satisfied:     [] LTC:     [] Home with Hospice   - [place] [facility] offered? [] Yes, Preference:   [] NA    [] [facility] information provided.     [] Other:       Tanesha Meek RN  Case Management Department  For questions or concerns, please PerfectServe

## 2025-07-21 NOTE — DISCHARGE SUMMARY
[address] [place] [address]   Office [phone]  Fax [phone]       Discharge / Transfer / Off-Service Note     Name: Joe Kaur MRN: [id number]  Sex: Male   YOB: 1955  Age: 70 y.o.  PCP: Ochoa Wisdom MD     Date of admission: 7/16/2025  Date of discharge/transfer: 7/21/2025    Attending physician at admission: Inez Webb.  Attending physician at discharge/transfer: Santhosh Salcedo.  Resident physician at discharge/transfer: Ang Quezada MD     Consultants during hospitalization  IP CONSULT TO ONCOLOGY     Admission diagnoses   Pancytopenia (HCC) [D61.788]    Recommended follow-up after discharge    1. PCP-Ochoa Wisdom MD  2. Oncology-Rivera Marquez MD  3. Rheumatology-Emerita Zamora MD     To follow up on with PCP:   - Resumption of Losartan, holding for DESI. Recheck BMP  - Monitor CBC iso ongoing bone marrow suppression  - Discuss options for affording anticoagulation    To follow up on with Oncology:  - Continue outpatient transfusions as needed  - Follow-up pathology read of Bm bx  - Follow-up b12 repletion plan    To follow up on with Rheumatology:  - Long-term RA treatment regimen  ------------------------------------------------------------------------------------------------------------------    History of Present Illness    As per admitting provider, Dr. Alvarez:   \"Joe Kaur is a 70 y.o. male with known history of HTN, rheumatoid arthritis, CAD, BPH, MGUS, DVT, GERD, HLD, Hx of RCC who presents to the ER after being sent from his hematologist (Dr. Marquez) for platelets of 7.  A routine office visit with his rheumatologist they found his platelets were low to 36 roughly 1 week ago.  He presented on July 11 to the ER where he was seen by hematologist who recommended admission at that time.  Patient declined though because of his birthday the next day and had a close follow-up with them in the outpatient setting.  On recheck on

## 2025-07-22 LAB
BLD PROD TYP BPU: NORMAL
BLOOD BANK BLOOD PRODUCT EXPIRATION DATE: NORMAL
BLOOD BANK DISPENSE STATUS: NORMAL
BLOOD BANK ISBT PRODUCT BLOOD TYPE: 6200
BLOOD BANK PRODUCT CODE: NORMAL
BLOOD BANK UNIT TYPE AND RH: NORMAL
BPU ID: NORMAL
UNIT DIVISION: 0
UNIT ISSUE DATE/TIME: NORMAL

## 2025-07-23 ENCOUNTER — OFFICE VISIT (OUTPATIENT)
Age: 70
End: 2025-07-23
Payer: MEDICARE

## 2025-07-23 ENCOUNTER — HOSPITAL ENCOUNTER (OUTPATIENT)
Facility: HOSPITAL | Age: 70
Setting detail: INFUSION SERIES
Discharge: HOME | End: 2025-07-23
Payer: MEDICARE

## 2025-07-23 VITALS
HEIGHT: 70 IN | TEMPERATURE: 97.9 F | OXYGEN SATURATION: 100 % | BODY MASS INDEX: 28 KG/M2 | HEART RATE: 68 BPM | WEIGHT: 195.6 LBS | RESPIRATION RATE: 16 BRPM | DIASTOLIC BLOOD PRESSURE: 62 MMHG | SYSTOLIC BLOOD PRESSURE: 107 MMHG

## 2025-07-23 DIAGNOSIS — D69.6 THROMBOCYTOPENIA: Primary | ICD-10-CM

## 2025-07-23 DIAGNOSIS — D53.9 MACROCYTIC ANEMIA: ICD-10-CM

## 2025-07-23 LAB
ALBUMIN SERPL-MCNC: 2.9 G/DL (ref 3.5–5)
ALBUMIN/GLOB SERPL: 0.8 (ref 1.1–2.2)
ALP SERPL-CCNC: 82 U/L (ref 45–117)
ALT SERPL-CCNC: 54 U/L (ref 12–78)
ANION GAP SERPL CALC-SCNC: 8 MMOL/L (ref 2–12)
AST SERPL-CCNC: 17 U/L (ref 15–37)
BASOPHILS # BLD: 0 K/UL (ref 0–0.1)
BASOPHILS NFR BLD: 0 % (ref 0–1)
BILIRUB SERPL-MCNC: 1 MG/DL (ref 0.2–1)
BUN SERPL-MCNC: 18 MG/DL (ref 6–20)
BUN/CREAT SERPL: 11 (ref 12–20)
CALCIUM SERPL-MCNC: 8.1 MG/DL (ref 8.5–10.1)
CHLORIDE SERPL-SCNC: 108 MMOL/L (ref 97–108)
CO2 SERPL-SCNC: 22 MMOL/L (ref 21–32)
CREAT SERPL-MCNC: 1.64 MG/DL (ref 0.7–1.3)
DIFFERENTIAL METHOD BLD: ABNORMAL
EOSINOPHIL # BLD: 0.15 K/UL (ref 0–0.4)
EOSINOPHIL NFR BLD: 5 % (ref 0–7)
ERYTHROCYTE [DISTWIDTH] IN BLOOD BY AUTOMATED COUNT: 13.2 % (ref 11.5–14.5)
GLOBULIN SER CALC-MCNC: 3.8 G/DL (ref 2–4)
GLUCOSE SERPL-MCNC: 104 MG/DL (ref 65–100)
HCT VFR BLD AUTO: 21.8 % (ref 36.6–50.3)
HGB BLD-MCNC: 8 G/DL (ref 12.1–17)
IMM GRANULOCYTES # BLD AUTO: 0 K/UL
IMM GRANULOCYTES NFR BLD AUTO: 0 %
LYMPHOCYTES # BLD: 1.76 K/UL (ref 0.8–3.5)
LYMPHOCYTES NFR BLD: 61 % (ref 12–49)
MCH RBC QN AUTO: 37.9 PG (ref 26–34)
MCHC RBC AUTO-ENTMCNC: 36.7 G/DL (ref 30–36.5)
MCV RBC AUTO: 103.3 FL (ref 80–99)
MONOCYTES # BLD: 0.12 K/UL (ref 0–1)
MONOCYTES NFR BLD: 4 % (ref 5–13)
NEUTS BAND NFR BLD MANUAL: 1 % (ref 0–6)
NEUTS SEG # BLD: 0.87 K/UL (ref 1.8–8)
NEUTS SEG NFR BLD: 29 % (ref 32–75)
NRBC # BLD: 0.03 K/UL (ref 0–0.01)
NRBC BLD-RTO: 1 PER 100 WBC
PLATELET # BLD AUTO: 24 K/UL (ref 150–400)
PLATELET COMMENT: ABNORMAL
PMV BLD AUTO: 12.2 FL (ref 8.9–12.9)
POTASSIUM SERPL-SCNC: 3.7 MMOL/L (ref 3.5–5.1)
PROT SERPL-MCNC: 6.7 G/DL (ref 6.4–8.2)
RBC # BLD AUTO: 2.11 M/UL (ref 4.1–5.7)
RBC MORPH BLD: ABNORMAL
SODIUM SERPL-SCNC: 138 MMOL/L (ref 136–145)
WBC # BLD AUTO: 2.9 K/UL (ref 4.1–11.1)

## 2025-07-23 PROCEDURE — 3078F DIAST BP <80 MM HG: CPT | Performed by: INTERNAL MEDICINE

## 2025-07-23 PROCEDURE — 99214 OFFICE O/P EST MOD 30 MIN: CPT | Performed by: INTERNAL MEDICINE

## 2025-07-23 PROCEDURE — 1123F ACP DISCUSS/DSCN MKR DOCD: CPT | Performed by: INTERNAL MEDICINE

## 2025-07-23 PROCEDURE — G8419 CALC BMI OUT NRM PARAM NOF/U: HCPCS | Performed by: INTERNAL MEDICINE

## 2025-07-23 PROCEDURE — 3017F COLORECTAL CA SCREEN DOC REV: CPT | Performed by: INTERNAL MEDICINE

## 2025-07-23 PROCEDURE — 1036F TOBACCO NON-USER: CPT | Performed by: INTERNAL MEDICINE

## 2025-07-23 PROCEDURE — 1111F DSCHRG MED/CURRENT MED MERGE: CPT | Performed by: INTERNAL MEDICINE

## 2025-07-23 PROCEDURE — 3074F SYST BP LT 130 MM HG: CPT | Performed by: INTERNAL MEDICINE

## 2025-07-23 PROCEDURE — 80053 COMPREHEN METABOLIC PANEL: CPT

## 2025-07-23 PROCEDURE — G8427 DOCREV CUR MEDS BY ELIG CLIN: HCPCS | Performed by: INTERNAL MEDICINE

## 2025-07-23 PROCEDURE — 1126F AMNT PAIN NOTED NONE PRSNT: CPT | Performed by: INTERNAL MEDICINE

## 2025-07-23 PROCEDURE — 1159F MED LIST DOCD IN RCRD: CPT | Performed by: INTERNAL MEDICINE

## 2025-07-23 PROCEDURE — 36415 COLL VENOUS BLD VENIPUNCTURE: CPT

## 2025-07-23 PROCEDURE — 85025 COMPLETE CBC W/AUTO DIFF WBC: CPT

## 2025-07-23 RX ORDER — SODIUM CHLORIDE 0.9 % (FLUSH) 0.9 %
5-40 SYRINGE (ML) INJECTION PRN
Start: 2025-07-23

## 2025-07-23 NOTE — PROGRESS NOTES
[facility] [place] [facility]  [address], [address] [facility] [address]  W: [phone]  F: [phone] Patient ID  Name: Joe Kaur  YOB: 1955  MRN: [id number]  Referring Provider:   No referring provider defined for this encounter.  Primary Care Provider:   Ochoa Wisdom MD       HEMATOLOGY/MEDICAL ONCOLOGY  NOTE     Reason for Evaluation:     Chief Complaint   Patient presents with    Follow-up     thrombocytopenia     Subjective:     History of Present Illness:   Date of Visit: 7/23/2025  Joe Kaur is a 70 y.o. male who presents as a hospital follow-up for SEVERE THROMBOCYTOPENIA.   -- Patient reports feeling better.  He denies any bleeding episodes.  -- He wonders if he is supposed to take B12 injections while he already has sublingual B12.     -----  Past Medical History:   Diagnosis Date    Acute pulmonary embolism with acute cor pulmonale ([facility]) 07/13/2024    BPH (benign prostatic hypertrophy) 11/19/2012    Cellulitis of left leg 07/13/2024    2/2 venous ulcer of the L leg from DVT.     Medication: Amoxicillin 500mg TID x 10 days (started 07/14/2024)  - patient thinks maybe a little better looking, no really sure. Needs me to look at it.      Eczema 11/19/2012    Elevated cholesterol 11/19/2012    GERD (gastroesophageal reflux disease) 11/11/2016    History of renal cell cancer 11/19/2012    Clear cell    HTN (hypertension) 11/19/2012    Iritis 11/19/2012    RA (rheumatoid arthritis) ([facility]) 11/19/2012    Renal cell cancer ([facility]) 11/19/2012    Venous stasis ulcer of left lower leg with edema of left lower leg ([facility]) 07/13/2024    2/2 acute DVT second time. Led to cellulitis.      Interval Hx:  - has been keeping leg elevated.  - hasn't been able to tolerate full compression wraps.        Past Surgical History:   Procedure Laterality Date    KIDNEY REMOVAL        Social History     Tobacco Use    Smoking status: Never     Passive exposure: Never

## 2025-07-24 ENCOUNTER — TELEPHONE (OUTPATIENT)
Facility: CLINIC | Age: 70
End: 2025-07-24

## 2025-07-24 NOTE — TELEPHONE ENCOUNTER
Care Transitions Initial Follow Up Call    Outreach made within 2 business days of discharge: Yes    Patient: Joe Kaur Patient : 1955   MRN: [id number]  Reason for Admission:     Scheduled appointment with PCP within 7-14 days    Follow Up  Future Appointments   Date Time Provider Department Center   2025  8:30 AM SS CHEMO CHAIR [facility]   2025  8:45 AM Rivera Marquez MD [facility]   2025  9:00 AM Rivera Marquez MD [facility]   2025  9:40 AM Emerita Zamora APRN - NP AO[facility]   10/23/2025 10:00 AM [facility]dom [facility]       Emilie Sandoval MA

## 2025-07-25 ENCOUNTER — TELEPHONE (OUTPATIENT)
Age: 70
End: 2025-07-25

## 2025-07-25 ENCOUNTER — HOSPITAL ENCOUNTER (OUTPATIENT)
Facility: HOSPITAL | Age: 70
Setting detail: INFUSION SERIES
Discharge: HOME | End: 2025-07-25
Payer: MEDICARE

## 2025-07-25 ENCOUNTER — OFFICE VISIT (OUTPATIENT)
Age: 70
End: 2025-07-25
Payer: MEDICARE

## 2025-07-25 VITALS
HEIGHT: 70 IN | HEART RATE: 71 BPM | DIASTOLIC BLOOD PRESSURE: 61 MMHG | RESPIRATION RATE: 16 BRPM | TEMPERATURE: 97.5 F | BODY MASS INDEX: 27.94 KG/M2 | SYSTOLIC BLOOD PRESSURE: 114 MMHG | WEIGHT: 195.2 LBS | OXYGEN SATURATION: 97 %

## 2025-07-25 VITALS — TEMPERATURE: 98 F | OXYGEN SATURATION: 100 % | HEART RATE: 75 BPM | RESPIRATION RATE: 16 BRPM

## 2025-07-25 DIAGNOSIS — R76.8 HEPATITIS B CORE ANTIBODY POSITIVE: ICD-10-CM

## 2025-07-25 DIAGNOSIS — D53.9 MACROCYTIC ANEMIA: ICD-10-CM

## 2025-07-25 DIAGNOSIS — D69.6 THROMBOCYTOPENIA: Primary | ICD-10-CM

## 2025-07-25 LAB
ALBUMIN SERPL-MCNC: 2.8 G/DL (ref 3.5–5)
ALBUMIN/GLOB SERPL: 0.7 (ref 1.1–2.2)
ALP SERPL-CCNC: 97 U/L (ref 45–117)
ALT SERPL-CCNC: 39 U/L (ref 12–78)
ANION GAP SERPL CALC-SCNC: 6 MMOL/L (ref 2–12)
AST SERPL-CCNC: 13 U/L (ref 15–37)
BASOPHILS # BLD: 0 K/UL (ref 0–0.1)
BASOPHILS NFR BLD: 0 % (ref 0–1)
BILIRUB SERPL-MCNC: 0.6 MG/DL (ref 0.2–1)
BUN SERPL-MCNC: 10 MG/DL (ref 6–20)
BUN/CREAT SERPL: 8 (ref 12–20)
CALCIUM SERPL-MCNC: 8.1 MG/DL (ref 8.5–10.1)
CHLORIDE SERPL-SCNC: 109 MMOL/L (ref 97–108)
CO2 SERPL-SCNC: 25 MMOL/L (ref 21–32)
CREAT SERPL-MCNC: 1.27 MG/DL (ref 0.7–1.3)
DIFFERENTIAL METHOD BLD: ABNORMAL
EOSINOPHIL # BLD: 0.03 K/UL (ref 0–0.4)
EOSINOPHIL NFR BLD: 1 % (ref 0–7)
ERYTHROCYTE [DISTWIDTH] IN BLOOD BY AUTOMATED COUNT: 13.6 % (ref 11.5–14.5)
GLOBULIN SER CALC-MCNC: 3.9 G/DL (ref 2–4)
GLUCOSE SERPL-MCNC: 99 MG/DL (ref 65–100)
HCT VFR BLD AUTO: 22.9 % (ref 36.6–50.3)
HGB BLD-MCNC: 8 G/DL (ref 12.1–17)
IMM GRANULOCYTES # BLD AUTO: 0 K/UL
IMM GRANULOCYTES NFR BLD AUTO: 0 %
LYMPHOCYTES # BLD: 1.02 K/UL (ref 0.8–3.5)
LYMPHOCYTES NFR BLD: 32 % (ref 12–49)
MCH RBC QN AUTO: 36.5 PG (ref 26–34)
MCHC RBC AUTO-ENTMCNC: 34.9 G/DL (ref 30–36.5)
MCV RBC AUTO: 104.6 FL (ref 80–99)
MONOCYTES # BLD: 0.38 K/UL (ref 0–1)
MONOCYTES NFR BLD: 12 % (ref 5–13)
NEUTS SEG # BLD: 1.77 K/UL (ref 1.8–8)
NEUTS SEG NFR BLD: 55 % (ref 32–75)
NRBC # BLD: 0.02 K/UL (ref 0–0.01)
NRBC BLD-RTO: 0.6 PER 100 WBC
PLATELET # BLD AUTO: 36 K/UL (ref 150–400)
POTASSIUM SERPL-SCNC: 3.8 MMOL/L (ref 3.5–5.1)
PROT SERPL-MCNC: 6.7 G/DL (ref 6.4–8.2)
RBC # BLD AUTO: 2.19 M/UL (ref 4.1–5.7)
RBC MORPH BLD: ABNORMAL
SODIUM SERPL-SCNC: 140 MMOL/L (ref 136–145)
WBC # BLD AUTO: 3.2 K/UL (ref 4.1–11.1)
WBC MORPH BLD: ABNORMAL

## 2025-07-25 PROCEDURE — 3074F SYST BP LT 130 MM HG: CPT | Performed by: INTERNAL MEDICINE

## 2025-07-25 PROCEDURE — 80053 COMPREHEN METABOLIC PANEL: CPT

## 2025-07-25 PROCEDURE — 36415 COLL VENOUS BLD VENIPUNCTURE: CPT

## 2025-07-25 PROCEDURE — 85025 COMPLETE CBC W/AUTO DIFF WBC: CPT

## 2025-07-25 PROCEDURE — 99213 OFFICE O/P EST LOW 20 MIN: CPT | Performed by: INTERNAL MEDICINE

## 2025-07-25 PROCEDURE — 1126F AMNT PAIN NOTED NONE PRSNT: CPT | Performed by: INTERNAL MEDICINE

## 2025-07-25 PROCEDURE — 3078F DIAST BP <80 MM HG: CPT | Performed by: INTERNAL MEDICINE

## 2025-07-25 PROCEDURE — G8419 CALC BMI OUT NRM PARAM NOF/U: HCPCS | Performed by: INTERNAL MEDICINE

## 2025-07-25 PROCEDURE — 3017F COLORECTAL CA SCREEN DOC REV: CPT | Performed by: INTERNAL MEDICINE

## 2025-07-25 PROCEDURE — G8427 DOCREV CUR MEDS BY ELIG CLIN: HCPCS | Performed by: INTERNAL MEDICINE

## 2025-07-25 PROCEDURE — 1159F MED LIST DOCD IN RCRD: CPT | Performed by: INTERNAL MEDICINE

## 2025-07-25 PROCEDURE — 1123F ACP DISCUSS/DSCN MKR DOCD: CPT | Performed by: INTERNAL MEDICINE

## 2025-07-25 PROCEDURE — 1111F DSCHRG MED/CURRENT MED MERGE: CPT | Performed by: INTERNAL MEDICINE

## 2025-07-25 PROCEDURE — 1036F TOBACCO NON-USER: CPT | Performed by: INTERNAL MEDICINE

## 2025-07-25 RX ORDER — SODIUM CHLORIDE 0.9 % (FLUSH) 0.9 %
5-40 SYRINGE (ML) INJECTION PRN
Start: 2025-07-25

## 2025-07-25 NOTE — PROGRESS NOTES
Joe TOURE Vincent is a 70 y.o. male follow up for Thrombocytopenia       1. Have you been to the ER, urgent care clinic since your last visit?  Hospitalized since your last visit?{no    2. Have you seen or consulted any other health care providers outside of the [facility] since your last visit?  Include any pap smears or colon screening. no

## 2025-07-25 NOTE — TELEPHONE ENCOUNTER
Patient is calling he was in the hospital for 6 days and was taken off his Methotraxate and Predisone because it was effecting his blood count now patient wants to know what to do he is due to take his medication on Tuesday. Patient is aware that BLACK Zamora is out until the 29th. Patient would like a call back early morning on Tuesday so that he knows what to do. Patient can be reached back at [phone]

## 2025-07-25 NOTE — PROGRESS NOTES
Cancer [place] at [facility]  [address], [address] [facility] [address]  W: [phone]  F: [phone] Patient ID  Name: Joe Kaur  YOB: 1955  MRN: [id number]  Referring Provider:   No referring provider defined for this encounter.  Primary Care Provider:   Ochao Wisdom MD       HEMATOLOGY/MEDICAL ONCOLOGY  NOTE     Reason for Evaluation:     Chief Complaint   Patient presents with    Follow-up     Thrombocytopenia     Subjective:     History of Present Illness:   Date of Visit: 7/25/2025  Joe Kaur is a 70 y.o. male who presents for a follow-up evaluation for  THROMBOCYTOPENIA.   History of Present Illness  The patient is a 70-year-old individual presenting for follow-up of thrombocytopenia.    Thrombocytopenia  - Reports doing well.  - No bleeding issues or complaints.  - Follow-up with rheumatology scheduled.    General Health and Lifestyle  - No history of tattoos.  - No hepatitis B vaccine.  - Not maintaining vitamin intake.  - Uses Nexium for acid reflux.    Past Medical History  - History of kidney cancer.  - Underwent nephrectomy.  - No radiation therapy.  - Discontinued morphine drip after 20 minutes.      -----  Past Medical History:   Diagnosis Date    Acute pulmonary embolism with acute cor pulmonale (HCC) 07/13/2024    BPH (benign prostatic hypertrophy) 11/19/2012    Cellulitis of left leg 07/13/2024    2/2 venous ulcer of the L leg from DVT.     Medication: Amoxicillin 500mg TID x 10 days (started 07/14/2024)  - patient thinks maybe a little better looking, no really sure. Needs me to look at it.      Eczema 11/19/2012    Elevated cholesterol 11/19/2012    GERD (gastroesophageal reflux disease) 11/11/2016    History of renal cell cancer 11/19/2012    Clear cell    HTN (hypertension) 11/19/2012    Iritis 11/19/2012    RA (rheumatoid arthritis) (HCC) 11/19/2012    Renal cell cancer (HCC) 11/19/2012    Venous stasis ulcer of left lower

## 2025-07-29 NOTE — TELEPHONE ENCOUNTER
Spoke to pt informed pt per Emerita NP message below   Please tell him he will have to remain off treatment until his blood count improves.   Pt asked what medication can he take if he has a flare up, pt was informed per Emerita that if he has a flare a prednisone taper can be prescribed, but no alternative medication can be prescribed to the patient until his blood count improves. Patient verbally acknowledged understanding

## 2025-08-05 ENCOUNTER — OFFICE VISIT (OUTPATIENT)
Age: 70
End: 2025-08-05

## 2025-08-05 VITALS
HEART RATE: 74 BPM | OXYGEN SATURATION: 95 % | SYSTOLIC BLOOD PRESSURE: 126 MMHG | WEIGHT: 196 LBS | TEMPERATURE: 98 F | DIASTOLIC BLOOD PRESSURE: 75 MMHG | RESPIRATION RATE: 18 BRPM | HEIGHT: 70 IN | BODY MASS INDEX: 28.06 KG/M2

## 2025-08-05 DIAGNOSIS — R60.0 BILATERAL LEG EDEMA: Primary | ICD-10-CM

## 2025-08-05 DIAGNOSIS — R06.02 SHORTNESS OF BREATH: ICD-10-CM

## 2025-08-05 ASSESSMENT — ENCOUNTER SYMPTOMS
SHORTNESS OF BREATH: 1
VOMITING: 0
DIARRHEA: 0
GASTROINTESTINAL NEGATIVE: 1
NAUSEA: 0
COUGH: 0
BLOOD IN STOOL: 0
CONSTIPATION: 0

## 2025-08-06 ENCOUNTER — HOSPITAL ENCOUNTER (INPATIENT)
Facility: HOSPITAL | Age: 70
LOS: 1 days | Discharge: HOME | DRG: 300 | End: 2025-08-07
Attending: EMERGENCY MEDICINE | Admitting: FAMILY MEDICINE
Payer: MEDICARE

## 2025-08-06 ENCOUNTER — APPOINTMENT (OUTPATIENT)
Dept: VASCULAR SURGERY | Facility: HOSPITAL | Age: 70
DRG: 300 | End: 2025-08-06
Attending: EMERGENCY MEDICINE
Payer: MEDICARE

## 2025-08-06 ENCOUNTER — APPOINTMENT (OUTPATIENT)
Facility: HOSPITAL | Age: 70
DRG: 300 | End: 2025-08-06
Payer: MEDICARE

## 2025-08-06 DIAGNOSIS — I82.412 ACUTE DEEP VEIN THROMBOSIS (DVT) OF FEMORAL VEIN OF LEFT LOWER EXTREMITY (HCC): Primary | ICD-10-CM

## 2025-08-06 DIAGNOSIS — I25.10 CORONARY ARTERY DISEASE INVOLVING NATIVE CORONARY ARTERY OF NATIVE HEART WITHOUT ANGINA PECTORIS: ICD-10-CM

## 2025-08-06 DIAGNOSIS — R06.09 DOE (DYSPNEA ON EXERTION): ICD-10-CM

## 2025-08-06 DIAGNOSIS — R06.02 SHORTNESS OF BREATH: ICD-10-CM

## 2025-08-06 DIAGNOSIS — L02.92 FURUNCLE: ICD-10-CM

## 2025-08-06 DIAGNOSIS — R79.89 TROPONIN LEVEL ELEVATED: ICD-10-CM

## 2025-08-06 DIAGNOSIS — D64.9 CHRONIC ANEMIA: ICD-10-CM

## 2025-08-06 DIAGNOSIS — I50.9 ACUTE CONGESTIVE HEART FAILURE, UNSPECIFIED HEART FAILURE TYPE (HCC): ICD-10-CM

## 2025-08-06 PROBLEM — I82.492 ACUTE DEEP VEIN THROMBOSIS (DVT) OF OTHER SPECIFIED VEIN OF LEFT LOWER EXTREMITY (HCC): Status: ACTIVE | Noted: 2025-08-06

## 2025-08-06 LAB
ALBUMIN SERPL-MCNC: 2.6 G/DL (ref 3.5–5.2)
ALBUMIN/GLOB SERPL: 0.6 (ref 1.1–2.2)
ALP SERPL-CCNC: 136 U/L (ref 40–129)
ALT SERPL-CCNC: 6 U/L (ref 10–50)
ANION GAP SERPL CALC-SCNC: 6 MMOL/L (ref 2–14)
AST SERPL-CCNC: 29 U/L (ref 10–50)
BASOPHILS # BLD: 0 K/UL (ref 0–0.1)
BASOPHILS NFR BLD: 0 % (ref 0–1)
BILIRUB SERPL-MCNC: 0.3 MG/DL (ref 0–1.2)
BUN SERPL-MCNC: 7 MG/DL (ref 8–23)
BUN/CREAT SERPL: 6 (ref 12–20)
CALCIUM SERPL-MCNC: 8.2 MG/DL (ref 8.8–10.2)
CHLORIDE SERPL-SCNC: 106 MMOL/L (ref 98–107)
CO2 SERPL-SCNC: 25 MMOL/L (ref 20–29)
CREAT SERPL-MCNC: 1.15 MG/DL (ref 0.7–1.2)
DIFFERENTIAL METHOD BLD: ABNORMAL
EKG ATRIAL RATE: 82 BPM
EKG DIAGNOSIS: NORMAL
EKG P AXIS: 45 DEGREES
EKG P-R INTERVAL: 280 MS
EKG Q-T INTERVAL: 372 MS
EKG QRS DURATION: 80 MS
EKG QTC CALCULATION (BAZETT): 434 MS
EKG R AXIS: 75 DEGREES
EKG T AXIS: -26 DEGREES
EKG VENTRICULAR RATE: 82 BPM
EOSINOPHIL # BLD: 0.49 K/UL (ref 0–0.4)
EOSINOPHIL NFR BLD: 6 % (ref 0–7)
ERYTHROCYTE [DISTWIDTH] IN BLOOD BY AUTOMATED COUNT: 15.4 % (ref 11.5–14.5)
GLOBULIN SER CALC-MCNC: 4.2 G/DL (ref 2–4)
GLUCOSE SERPL-MCNC: 106 MG/DL (ref 65–100)
HCT VFR BLD AUTO: 27.4 % (ref 36.6–50.3)
HGB BLD-MCNC: 9.2 G/DL (ref 12.1–17)
IMM GRANULOCYTES # BLD AUTO: 0 K/UL
IMM GRANULOCYTES NFR BLD AUTO: 0 %
LYMPHOCYTES # BLD: 2.11 K/UL (ref 0.8–3.5)
LYMPHOCYTES NFR BLD: 26 % (ref 12–49)
MAGNESIUM SERPL-MCNC: 2 MG/DL (ref 1.6–2.4)
MCH RBC QN AUTO: 35.7 PG (ref 26–34)
MCHC RBC AUTO-ENTMCNC: 33.6 G/DL (ref 30–36.5)
MCV RBC AUTO: 106.2 FL (ref 80–99)
MONOCYTES # BLD: 0.89 K/UL (ref 0–1)
MONOCYTES NFR BLD: 11 % (ref 5–13)
NEUTS SEG # BLD: 4.61 K/UL (ref 1.8–8)
NEUTS SEG NFR BLD: 57 % (ref 32–75)
NRBC # BLD: 0.02 K/UL (ref 0–0.01)
NRBC BLD-RTO: 0.2 PER 100 WBC
NT PRO BNP: 3393 PG/ML (ref 0–125)
PLATELET # BLD AUTO: 417 K/UL (ref 150–400)
PMV BLD AUTO: 10.5 FL (ref 8.9–12.9)
POTASSIUM SERPL-SCNC: 4.7 MMOL/L (ref 3.5–5.1)
PROT SERPL-MCNC: 6.8 G/DL (ref 6.4–8.3)
RBC # BLD AUTO: 2.58 M/UL (ref 4.1–5.7)
RBC MORPH BLD: ABNORMAL
RBC MORPH BLD: ABNORMAL
SODIUM SERPL-SCNC: 137 MMOL/L (ref 136–145)
TROPONIN T SERPL HS-MCNC: 23.1 NG/L (ref 0–22)
TROPONIN T SERPL HS-MCNC: 28.5 NG/L (ref 0–22)
WBC # BLD AUTO: 8.1 K/UL (ref 4.1–11.1)

## 2025-08-06 PROCEDURE — 93005 ELECTROCARDIOGRAM TRACING: CPT | Performed by: EMERGENCY MEDICINE

## 2025-08-06 PROCEDURE — 36415 COLL VENOUS BLD VENIPUNCTURE: CPT

## 2025-08-06 PROCEDURE — 99285 EMERGENCY DEPT VISIT HI MDM: CPT

## 2025-08-06 PROCEDURE — 6360000002 HC RX W HCPCS: Performed by: EMERGENCY MEDICINE

## 2025-08-06 PROCEDURE — 94761 N-INVAS EAR/PLS OXIMETRY MLT: CPT

## 2025-08-06 PROCEDURE — 93970 EXTREMITY STUDY: CPT

## 2025-08-06 PROCEDURE — 85025 COMPLETE CBC W/AUTO DIFF WBC: CPT

## 2025-08-06 PROCEDURE — 80053 COMPREHEN METABOLIC PANEL: CPT

## 2025-08-06 PROCEDURE — 6360000004 HC RX CONTRAST MEDICATION: Performed by: EMERGENCY MEDICINE

## 2025-08-06 PROCEDURE — 93010 ELECTROCARDIOGRAM REPORT: CPT | Performed by: INTERNAL MEDICINE

## 2025-08-06 PROCEDURE — 99222 1ST HOSP IP/OBS MODERATE 55: CPT

## 2025-08-06 PROCEDURE — 6370000000 HC RX 637 (ALT 250 FOR IP): Performed by: EMERGENCY MEDICINE

## 2025-08-06 PROCEDURE — 84484 ASSAY OF TROPONIN QUANT: CPT

## 2025-08-06 PROCEDURE — 2500000003 HC RX 250 WO HCPCS

## 2025-08-06 PROCEDURE — 83735 ASSAY OF MAGNESIUM: CPT

## 2025-08-06 PROCEDURE — 2060000000 HC ICU INTERMEDIATE R&B

## 2025-08-06 PROCEDURE — 83880 ASSAY OF NATRIURETIC PEPTIDE: CPT

## 2025-08-06 PROCEDURE — 71275 CT ANGIOGRAPHY CHEST: CPT

## 2025-08-06 PROCEDURE — 71046 X-RAY EXAM CHEST 2 VIEWS: CPT

## 2025-08-06 RX ORDER — AMLODIPINE BESYLATE 5 MG/1
5 TABLET ORAL DAILY
Status: DISCONTINUED | OUTPATIENT
Start: 2025-08-07 | End: 2025-08-07 | Stop reason: HOSPADM

## 2025-08-06 RX ORDER — SODIUM CHLORIDE 0.9 % (FLUSH) 0.9 %
5-40 SYRINGE (ML) INJECTION EVERY 12 HOURS SCHEDULED
Status: DISCONTINUED | OUTPATIENT
Start: 2025-08-06 | End: 2025-08-07 | Stop reason: HOSPADM

## 2025-08-06 RX ORDER — FUROSEMIDE 10 MG/ML
80 INJECTION INTRAMUSCULAR; INTRAVENOUS ONCE
Status: COMPLETED | OUTPATIENT
Start: 2025-08-06 | End: 2025-08-06

## 2025-08-06 RX ORDER — POLYETHYLENE GLYCOL 3350 17 G/17G
17 POWDER, FOR SOLUTION ORAL DAILY PRN
Status: DISCONTINUED | OUTPATIENT
Start: 2025-08-06 | End: 2025-08-07 | Stop reason: HOSPADM

## 2025-08-06 RX ORDER — FOLIC ACID 1 MG/1
1 TABLET ORAL DAILY
Status: DISCONTINUED | OUTPATIENT
Start: 2025-08-07 | End: 2025-08-07 | Stop reason: HOSPADM

## 2025-08-06 RX ORDER — IOPAMIDOL 755 MG/ML
100 INJECTION, SOLUTION INTRAVASCULAR
Status: COMPLETED | OUTPATIENT
Start: 2025-08-06 | End: 2025-08-06

## 2025-08-06 RX ORDER — MULTIVITAMIN WITH IRON
1000 TABLET ORAL DAILY
Status: DISCONTINUED | OUTPATIENT
Start: 2025-08-07 | End: 2025-08-07 | Stop reason: HOSPADM

## 2025-08-06 RX ORDER — SODIUM CHLORIDE 9 MG/ML
INJECTION, SOLUTION INTRAVENOUS PRN
Status: DISCONTINUED | OUTPATIENT
Start: 2025-08-06 | End: 2025-08-07 | Stop reason: HOSPADM

## 2025-08-06 RX ORDER — ENOXAPARIN SODIUM 100 MG/ML
1 INJECTION SUBCUTANEOUS
Status: COMPLETED | OUTPATIENT
Start: 2025-08-06 | End: 2025-08-06

## 2025-08-06 RX ORDER — ONDANSETRON 2 MG/ML
4 INJECTION INTRAMUSCULAR; INTRAVENOUS EVERY 6 HOURS PRN
Status: DISCONTINUED | OUTPATIENT
Start: 2025-08-06 | End: 2025-08-07 | Stop reason: HOSPADM

## 2025-08-06 RX ORDER — ENOXAPARIN SODIUM 100 MG/ML
1 INJECTION SUBCUTANEOUS EVERY 12 HOURS
Status: DISCONTINUED | OUTPATIENT
Start: 2025-08-07 | End: 2025-08-07

## 2025-08-06 RX ORDER — ACETAMINOPHEN 650 MG/1
650 SUPPOSITORY RECTAL EVERY 6 HOURS PRN
Status: DISCONTINUED | OUTPATIENT
Start: 2025-08-06 | End: 2025-08-07 | Stop reason: HOSPADM

## 2025-08-06 RX ORDER — SODIUM CHLORIDE 0.9 % (FLUSH) 0.9 %
5-40 SYRINGE (ML) INJECTION PRN
Status: DISCONTINUED | OUTPATIENT
Start: 2025-08-06 | End: 2025-08-07 | Stop reason: HOSPADM

## 2025-08-06 RX ORDER — ASPIRIN 325 MG
325 TABLET ORAL
Status: COMPLETED | OUTPATIENT
Start: 2025-08-06 | End: 2025-08-06

## 2025-08-06 RX ORDER — ONDANSETRON 4 MG/1
4 TABLET, ORALLY DISINTEGRATING ORAL EVERY 8 HOURS PRN
Status: DISCONTINUED | OUTPATIENT
Start: 2025-08-06 | End: 2025-08-07 | Stop reason: HOSPADM

## 2025-08-06 RX ORDER — ACETAMINOPHEN 325 MG/1
650 TABLET ORAL EVERY 6 HOURS PRN
Status: DISCONTINUED | OUTPATIENT
Start: 2025-08-06 | End: 2025-08-07 | Stop reason: HOSPADM

## 2025-08-06 RX ADMIN — ENOXAPARIN SODIUM 90 MG: 100 INJECTION SUBCUTANEOUS at 15:39

## 2025-08-06 RX ADMIN — IOPAMIDOL 100 ML: 755 INJECTION, SOLUTION INTRAVENOUS at 12:49

## 2025-08-06 RX ADMIN — SODIUM CHLORIDE, PRESERVATIVE FREE 10 ML: 5 INJECTION INTRAVENOUS at 19:29

## 2025-08-06 RX ADMIN — FUROSEMIDE 80 MG: 10 INJECTION, SOLUTION INTRAMUSCULAR; INTRAVENOUS at 15:38

## 2025-08-06 RX ADMIN — ASPIRIN 325 MG: 325 TABLET ORAL at 15:38

## 2025-08-06 ASSESSMENT — ENCOUNTER SYMPTOMS
WHEEZING: 0
DIARRHEA: 0
ABDOMINAL PAIN: 0
CONSTIPATION: 0
COUGH: 1
SHORTNESS OF BREATH: 1
VOMITING: 0
ABDOMINAL DISTENTION: 0
NAUSEA: 0
COUGH: 0
BLOOD IN STOOL: 0
ANAL BLEEDING: 0

## 2025-08-06 ASSESSMENT — LIFESTYLE VARIABLES
HOW OFTEN DO YOU HAVE A DRINK CONTAINING ALCOHOL: NEVER
HOW MANY STANDARD DRINKS CONTAINING ALCOHOL DO YOU HAVE ON A TYPICAL DAY: PATIENT DOES NOT DRINK

## 2025-08-06 ASSESSMENT — PAIN - FUNCTIONAL ASSESSMENT: PAIN_FUNCTIONAL_ASSESSMENT: NONE - DENIES PAIN

## 2025-08-07 ENCOUNTER — APPOINTMENT (OUTPATIENT)
Facility: HOSPITAL | Age: 70
DRG: 300 | End: 2025-08-07
Payer: MEDICARE

## 2025-08-07 VITALS
DIASTOLIC BLOOD PRESSURE: 80 MMHG | HEIGHT: 70 IN | RESPIRATION RATE: 14 BRPM | BODY MASS INDEX: 28.63 KG/M2 | OXYGEN SATURATION: 96 % | HEART RATE: 75 BPM | SYSTOLIC BLOOD PRESSURE: 125 MMHG | WEIGHT: 200 LBS | TEMPERATURE: 98.2 F

## 2025-08-07 PROBLEM — I82.412 ACUTE DEEP VEIN THROMBOSIS (DVT) OF FEMORAL VEIN OF LEFT LOWER EXTREMITY (HCC): Status: ACTIVE | Noted: 2025-08-07

## 2025-08-07 LAB
ALBUMIN SERPL-MCNC: 2.6 G/DL (ref 3.5–5.2)
ALBUMIN/GLOB SERPL: 0.7 (ref 1.1–2.2)
ALP SERPL-CCNC: 124 U/L (ref 40–129)
ALT SERPL-CCNC: 16 U/L (ref 10–50)
ANION GAP SERPL CALC-SCNC: 10 MMOL/L (ref 2–14)
AST SERPL-CCNC: 22 U/L (ref 10–50)
BILIRUB SERPL-MCNC: 0.3 MG/DL (ref 0–1.2)
BUN SERPL-MCNC: 9 MG/DL (ref 8–23)
BUN/CREAT SERPL: 7 (ref 12–20)
CALCIUM SERPL-MCNC: 8.3 MG/DL (ref 8.8–10.2)
CHLORIDE SERPL-SCNC: 105 MMOL/L (ref 98–107)
CO2 SERPL-SCNC: 24 MMOL/L (ref 20–29)
CREAT SERPL-MCNC: 1.3 MG/DL (ref 0.7–1.2)
ECHO AO ASC DIAM: 2.7 CM
ECHO AO ASCENDING AORTA INDEX: 1.29 CM/M2
ECHO AO ROOT DIAM: 3.3 CM
ECHO AO ROOT INDEX: 1.58 CM/M2
ECHO AV AREA PEAK VELOCITY: 2.1 CM2
ECHO AV AREA PEAK VELOCITY: 2.3 CM2
ECHO AV AREA VTI: 2.1 CM2
ECHO AV AREA/BSA VTI: 1 CM2/M2
ECHO AV MEAN GRADIENT: 7 MMHG
ECHO AV MEAN VELOCITY: 1.2 M/S
ECHO AV PEAK GRADIENT: 11 MMHG
ECHO AV PEAK GRADIENT: 13 MMHG
ECHO AV PEAK VELOCITY: 1.7 M/S
ECHO AV PEAK VELOCITY: 1.8 M/S
ECHO AV VTI: 38.5 CM
ECHO BSA: 2.12 M2
ECHO BSA: 2.12 M2
ECHO LA DIAMETER INDEX: 1.91 CM/M2
ECHO LA DIAMETER: 4 CM
ECHO LA TO AORTIC ROOT RATIO: 1.21
ECHO LA VOL A-L A2C: 126 ML (ref 18–58)
ECHO LA VOL A-L A4C: 82 ML (ref 18–58)
ECHO LA VOL BP: 95 ML (ref 18–58)
ECHO LA VOL MOD A2C: 126 ML (ref 18–58)
ECHO LA VOL MOD A4C: 72 ML (ref 18–58)
ECHO LA VOL/BSA BIPLANE: 45 ML/M2 (ref 16–34)
ECHO LA VOLUME AREA LENGTH: 103 ML
ECHO LA VOLUME INDEX A-L A2C: 60 ML/M2 (ref 16–34)
ECHO LA VOLUME INDEX A-L A4C: 39 ML/M2 (ref 16–34)
ECHO LA VOLUME INDEX AREA LENGTH: 49 ML/M2 (ref 16–34)
ECHO LA VOLUME INDEX MOD A2C: 60 ML/M2 (ref 16–34)
ECHO LA VOLUME INDEX MOD A4C: 34 ML/M2 (ref 16–34)
ECHO LV E' LATERAL VELOCITY: 8.01 CM/S
ECHO LV E' SEPTAL VELOCITY: 5.78 CM/S
ECHO LV EDV A2C: 149 ML
ECHO LV EDV A4C: 155 ML
ECHO LV EDV BP: 152 ML (ref 67–155)
ECHO LV EDV INDEX A4C: 74 ML/M2
ECHO LV EDV INDEX BP: 73 ML/M2
ECHO LV EDV NDEX A2C: 71 ML/M2
ECHO LV EF PHYSICIAN: 65 %
ECHO LV EJECTION FRACTION A2C: 63 %
ECHO LV EJECTION FRACTION A4C: 77 %
ECHO LV EJECTION FRACTION BIPLANE: 70 % (ref 55–100)
ECHO LV ESV A2C: 55 ML
ECHO LV ESV A4C: 35 ML
ECHO LV ESV BP: 46 ML (ref 22–58)
ECHO LV ESV INDEX A2C: 26 ML/M2
ECHO LV ESV INDEX A4C: 17 ML/M2
ECHO LV ESV INDEX BP: 22 ML/M2
ECHO LV FRACTIONAL SHORTENING: 33 % (ref 28–44)
ECHO LV INTERNAL DIMENSION DIASTOLE INDEX: 2.34 CM/M2
ECHO LV INTERNAL DIMENSION DIASTOLIC: 4.9 CM (ref 4.2–5.9)
ECHO LV INTERNAL DIMENSION SYSTOLIC INDEX: 1.58 CM/M2
ECHO LV INTERNAL DIMENSION SYSTOLIC: 3.3 CM
ECHO LV IVSD: 0.9 CM (ref 0.6–1)
ECHO LV MASS 2D: 153 G (ref 88–224)
ECHO LV MASS INDEX 2D: 73.2 G/M2 (ref 49–115)
ECHO LV POSTERIOR WALL DIASTOLIC: 0.9 CM (ref 0.6–1)
ECHO LV RELATIVE WALL THICKNESS RATIO: 0.37
ECHO LVOT AREA: 2.8 CM2
ECHO LVOT AV VTI INDEX: 0.72
ECHO LVOT DIAM: 1.9 CM
ECHO LVOT MEAN GRADIENT: 4 MMHG
ECHO LVOT PEAK GRADIENT: 7 MMHG
ECHO LVOT PEAK VELOCITY: 1.3 M/S
ECHO LVOT STROKE VOLUME INDEX: 37.6 ML/M2
ECHO LVOT SV: 78.5 ML
ECHO LVOT VTI: 27.7 CM
ECHO MV REGURGITANT PEAK GRADIENT: 92 MMHG
ECHO MV REGURGITANT PEAK VELOCITY: 4.8 M/S
ECHO PV MAX VELOCITY: 1 M/S
ECHO PV MEAN GRADIENT: 2 MMHG
ECHO PV MEAN VELOCITY: 0.7 M/S
ECHO PV PEAK GRADIENT: 4 MMHG
ECHO RV FREE WALL PEAK S': 16.4 CM/S
ECHO RV INTERNAL DIMENSION: 4 CM
ECHO RV TAPSE: 2.8 CM (ref 1.7–?)
ECHO RVOT MEAN GRADIENT: 1 MMHG
ECHO RVOT PEAK GRADIENT: 2 MMHG
ECHO RVOT PEAK VELOCITY: 0.7 M/S
ECHO RVOT VTI: 16.2 CM
ECHO TV REGURGITANT MAX VELOCITY: 2.58 M/S
ECHO TV REGURGITANT PEAK GRADIENT: 28 MMHG
ERYTHROCYTE [DISTWIDTH] IN BLOOD BY AUTOMATED COUNT: 15.3 % (ref 11.5–14.5)
GLOBULIN SER CALC-MCNC: 3.8 G/DL (ref 2–4)
GLUCOSE SERPL-MCNC: 101 MG/DL (ref 65–100)
HCT VFR BLD AUTO: 25.2 % (ref 36.6–50.3)
HGB BLD-MCNC: 8.6 G/DL (ref 12.1–17)
MAGNESIUM SERPL-MCNC: 2 MG/DL (ref 1.6–2.4)
MCH RBC QN AUTO: 35 PG (ref 26–34)
MCHC RBC AUTO-ENTMCNC: 34.1 G/DL (ref 30–36.5)
MCV RBC AUTO: 102.4 FL (ref 80–99)
NRBC # BLD: 0.03 K/UL (ref 0–0.01)
NRBC BLD-RTO: 0.4 PER 100 WBC
PLATELET # BLD AUTO: 312 K/UL (ref 150–400)
PMV BLD AUTO: 10.3 FL (ref 8.9–12.9)
POTASSIUM SERPL-SCNC: 4 MMOL/L (ref 3.5–5.1)
PROT SERPL-MCNC: 6.4 G/DL (ref 6.4–8.3)
RBC # BLD AUTO: 2.46 M/UL (ref 4.1–5.7)
SODIUM SERPL-SCNC: 139 MMOL/L (ref 136–145)
WBC # BLD AUTO: 7.2 K/UL (ref 4.1–11.1)

## 2025-08-07 PROCEDURE — 99222 1ST HOSP IP/OBS MODERATE 55: CPT | Performed by: INTERNAL MEDICINE

## 2025-08-07 PROCEDURE — 94761 N-INVAS EAR/PLS OXIMETRY MLT: CPT

## 2025-08-07 PROCEDURE — 80053 COMPREHEN METABOLIC PANEL: CPT

## 2025-08-07 PROCEDURE — 6360000002 HC RX W HCPCS

## 2025-08-07 PROCEDURE — 83735 ASSAY OF MAGNESIUM: CPT

## 2025-08-07 PROCEDURE — 85027 COMPLETE CBC AUTOMATED: CPT

## 2025-08-07 PROCEDURE — 93306 TTE W/DOPPLER COMPLETE: CPT

## 2025-08-07 PROCEDURE — 99238 HOSP IP/OBS DSCHRG MGMT 30/<: CPT

## 2025-08-07 PROCEDURE — 2500000003 HC RX 250 WO HCPCS

## 2025-08-07 PROCEDURE — 6370000000 HC RX 637 (ALT 250 FOR IP)

## 2025-08-07 PROCEDURE — 93306 TTE W/DOPPLER COMPLETE: CPT | Performed by: INTERNAL MEDICINE

## 2025-08-07 RX ORDER — FUROSEMIDE 20 MG/1
20 TABLET ORAL DAILY
Qty: 30 TABLET | Refills: 0 | Status: SHIPPED | OUTPATIENT
Start: 2025-08-07

## 2025-08-07 RX ORDER — CHLORHEXIDINE GLUCONATE 40 MG/ML
SOLUTION TOPICAL DAILY PRN
Qty: 236 ML | Refills: 1 | Status: SHIPPED | OUTPATIENT
Start: 2025-08-07

## 2025-08-07 RX ADMIN — ENOXAPARIN SODIUM 90 MG: 100 INJECTION SUBCUTANEOUS at 03:33

## 2025-08-07 RX ADMIN — AMLODIPINE BESYLATE 5 MG: 5 TABLET ORAL at 11:26

## 2025-08-07 RX ADMIN — SODIUM CHLORIDE, PRESERVATIVE FREE 10 ML: 5 INJECTION INTRAVENOUS at 09:30

## 2025-08-07 RX ADMIN — CYANOCOBALAMIN TAB 500 MCG 1000 MCG: 500 TAB at 11:22

## 2025-08-07 RX ADMIN — FOLIC ACID 1 MG: 1 TABLET ORAL at 11:22

## 2025-08-08 ENCOUNTER — TELEPHONE (OUTPATIENT)
Facility: CLINIC | Age: 70
End: 2025-08-08

## 2025-08-12 ENCOUNTER — OFFICE VISIT (OUTPATIENT)
Facility: CLINIC | Age: 70
End: 2025-08-12

## 2025-08-12 VITALS
HEART RATE: 68 BPM | OXYGEN SATURATION: 98 % | RESPIRATION RATE: 16 BRPM | TEMPERATURE: 98 F | SYSTOLIC BLOOD PRESSURE: 123 MMHG | WEIGHT: 196.2 LBS | BODY MASS INDEX: 28.09 KG/M2 | HEIGHT: 70 IN | DIASTOLIC BLOOD PRESSURE: 74 MMHG

## 2025-08-12 DIAGNOSIS — Z09 HOSPITAL DISCHARGE FOLLOW-UP: ICD-10-CM

## 2025-08-12 DIAGNOSIS — I50.9 HEART FAILURE, UNSPECIFIED HF CHRONICITY, UNSPECIFIED HEART FAILURE TYPE (HCC): ICD-10-CM

## 2025-08-12 DIAGNOSIS — M05.79 SEROPOSITIVE RHEUMATOID ARTHRITIS OF MULTIPLE SITES (HCC): ICD-10-CM

## 2025-08-12 DIAGNOSIS — D64.9 CHRONIC ANEMIA: ICD-10-CM

## 2025-08-12 DIAGNOSIS — I82.412 THROMBOSIS OF LEFT FEMORAL VEIN (HCC): ICD-10-CM

## 2025-08-12 DIAGNOSIS — I10 ESSENTIAL HYPERTENSION: ICD-10-CM

## 2025-08-12 DIAGNOSIS — I82.412 THROMBOSIS OF LEFT FEMORAL VEIN (HCC): Primary | ICD-10-CM

## 2025-08-13 LAB
ANION GAP SERPL CALC-SCNC: 13 MMOL/L (ref 2–14)
BASOPHILS # BLD: 0.21 K/UL (ref 0–0.1)
BASOPHILS NFR BLD: 2 % (ref 0–1)
BUN SERPL-MCNC: 10 MG/DL (ref 8–23)
BUN/CREAT SERPL: 8 (ref 12–20)
CALCIUM SERPL-MCNC: 8.8 MG/DL (ref 8.8–10.2)
CHLORIDE SERPL-SCNC: 103 MMOL/L (ref 98–107)
CO2 SERPL-SCNC: 22 MMOL/L (ref 20–29)
CREAT SERPL-MCNC: 1.23 MG/DL (ref 0.7–1.2)
DIFFERENTIAL METHOD BLD: ABNORMAL
EOSINOPHIL # BLD: 1.46 K/UL (ref 0–0.4)
EOSINOPHIL NFR BLD: 14.2 % (ref 0–7)
ERYTHROCYTE [DISTWIDTH] IN BLOOD BY AUTOMATED COUNT: 15 % (ref 11.5–14.5)
GLUCOSE SERPL-MCNC: 99 MG/DL (ref 65–100)
HCT VFR BLD AUTO: 30 % (ref 36.6–50.3)
HGB BLD-MCNC: 10.3 G/DL (ref 12.1–17)
IMM GRANULOCYTES # BLD AUTO: 0.06 K/UL (ref 0–0.04)
IMM GRANULOCYTES NFR BLD AUTO: 0.6 % (ref 0–0.5)
LYMPHOCYTES # BLD: 2.48 K/UL (ref 0.8–3.5)
LYMPHOCYTES NFR BLD: 24.1 % (ref 12–49)
MCH RBC QN AUTO: 35.3 PG (ref 26–34)
MCHC RBC AUTO-ENTMCNC: 34.3 G/DL (ref 30–36.5)
MCV RBC AUTO: 102.7 FL (ref 80–99)
MONOCYTES # BLD: 1.41 K/UL (ref 0–1)
MONOCYTES NFR BLD: 13.7 % (ref 5–13)
NEUTS SEG # BLD: 4.68 K/UL (ref 1.8–8)
NEUTS SEG NFR BLD: 45.4 % (ref 32–75)
NRBC # BLD: 0.02 K/UL (ref 0–0.01)
NRBC BLD-RTO: 0.2 PER 100 WBC
PLATELET # BLD AUTO: 228 K/UL (ref 150–400)
PMV BLD AUTO: 11.7 FL (ref 8.9–12.9)
POTASSIUM SERPL-SCNC: 4.2 MMOL/L (ref 3.5–5.1)
RBC # BLD AUTO: 2.92 M/UL (ref 4.1–5.7)
RBC MORPH BLD: ABNORMAL
SODIUM SERPL-SCNC: 138 MMOL/L (ref 136–145)
WBC # BLD AUTO: 10.3 K/UL (ref 4.1–11.1)

## 2025-08-20 ASSESSMENT — ENCOUNTER SYMPTOMS
COUGH: 0
NAUSEA: 0
ABDOMINAL PAIN: 0
SORE THROAT: 0
VOMITING: 0
EYE REDNESS: 0
CONSTIPATION: 0
DIARRHEA: 0
SHORTNESS OF BREATH: 0
BLOOD IN STOOL: 0
TROUBLE SWALLOWING: 0

## 2025-08-21 ENCOUNTER — OFFICE VISIT (OUTPATIENT)
Age: 70
End: 2025-08-21
Payer: MEDICARE

## 2025-08-21 VITALS
TEMPERATURE: 98.1 F | HEART RATE: 65 BPM | BODY MASS INDEX: 27.69 KG/M2 | WEIGHT: 193 LBS | OXYGEN SATURATION: 98 % | SYSTOLIC BLOOD PRESSURE: 132 MMHG | DIASTOLIC BLOOD PRESSURE: 75 MMHG | RESPIRATION RATE: 16 BRPM

## 2025-08-21 DIAGNOSIS — M05.9 SEROPOSITIVE RHEUMATOID ARTHRITIS (HCC): Primary | ICD-10-CM

## 2025-08-21 PROCEDURE — 3075F SYST BP GE 130 - 139MM HG: CPT | Performed by: NURSE PRACTITIONER

## 2025-08-21 PROCEDURE — G8419 CALC BMI OUT NRM PARAM NOF/U: HCPCS | Performed by: NURSE PRACTITIONER

## 2025-08-21 PROCEDURE — 99214 OFFICE O/P EST MOD 30 MIN: CPT | Performed by: NURSE PRACTITIONER

## 2025-08-21 PROCEDURE — 3078F DIAST BP <80 MM HG: CPT | Performed by: NURSE PRACTITIONER

## 2025-08-21 PROCEDURE — 3017F COLORECTAL CA SCREEN DOC REV: CPT | Performed by: NURSE PRACTITIONER

## 2025-08-21 PROCEDURE — 1036F TOBACCO NON-USER: CPT | Performed by: NURSE PRACTITIONER

## 2025-08-21 PROCEDURE — 1159F MED LIST DOCD IN RCRD: CPT | Performed by: NURSE PRACTITIONER

## 2025-08-21 PROCEDURE — 1111F DSCHRG MED/CURRENT MED MERGE: CPT | Performed by: NURSE PRACTITIONER

## 2025-08-21 PROCEDURE — 1123F ACP DISCUSS/DSCN MKR DOCD: CPT | Performed by: NURSE PRACTITIONER

## 2025-08-21 PROCEDURE — 1126F AMNT PAIN NOTED NONE PRSNT: CPT | Performed by: NURSE PRACTITIONER

## 2025-08-21 PROCEDURE — G8427 DOCREV CUR MEDS BY ELIG CLIN: HCPCS | Performed by: NURSE PRACTITIONER

## 2025-08-21 PROCEDURE — 1160F RVW MEDS BY RX/DR IN RCRD: CPT | Performed by: NURSE PRACTITIONER

## 2025-08-21 RX ORDER — LEFLUNOMIDE 20 MG/1
10 TABLET ORAL DAILY
Qty: 45 TABLET | Refills: 0 | Status: SHIPPED | OUTPATIENT
Start: 2025-08-21

## 2025-08-21 ASSESSMENT — PATIENT HEALTH QUESTIONNAIRE - PHQ9
SUM OF ALL RESPONSES TO PHQ QUESTIONS 1-9: 0
1. LITTLE INTEREST OR PLEASURE IN DOING THINGS: NOT AT ALL
SUM OF ALL RESPONSES TO PHQ QUESTIONS 1-9: 0
SUM OF ALL RESPONSES TO PHQ QUESTIONS 1-9: 0
2. FEELING DOWN, DEPRESSED OR HOPELESS: NOT AT ALL
SUM OF ALL RESPONSES TO PHQ QUESTIONS 1-9: 0

## 2025-08-21 ASSESSMENT — ENCOUNTER SYMPTOMS: EYE PAIN: 0

## 2025-08-25 ENCOUNTER — OFFICE VISIT (OUTPATIENT)
Age: 70
End: 2025-08-25
Payer: MEDICARE

## 2025-08-25 VITALS
RESPIRATION RATE: 16 BRPM | BODY MASS INDEX: 27.86 KG/M2 | TEMPERATURE: 97.8 F | WEIGHT: 194.6 LBS | SYSTOLIC BLOOD PRESSURE: 137 MMHG | OXYGEN SATURATION: 97 % | HEIGHT: 70 IN | DIASTOLIC BLOOD PRESSURE: 80 MMHG | HEART RATE: 64 BPM

## 2025-08-25 DIAGNOSIS — Z86.2 HISTORY OF THROMBOCYTOPENIA: ICD-10-CM

## 2025-08-25 DIAGNOSIS — D68.59 HYPERCOAGULABLE STATE: Primary | ICD-10-CM

## 2025-08-25 DIAGNOSIS — D53.9 MACROCYTIC ANEMIA: ICD-10-CM

## 2025-08-25 PROCEDURE — 1126F AMNT PAIN NOTED NONE PRSNT: CPT

## 2025-08-25 PROCEDURE — 3075F SYST BP GE 130 - 139MM HG: CPT

## 2025-08-25 PROCEDURE — 1159F MED LIST DOCD IN RCRD: CPT

## 2025-08-25 PROCEDURE — 3079F DIAST BP 80-89 MM HG: CPT

## 2025-08-25 PROCEDURE — 1123F ACP DISCUSS/DSCN MKR DOCD: CPT

## 2025-08-25 PROCEDURE — 99214 OFFICE O/P EST MOD 30 MIN: CPT

## 2025-08-25 PROCEDURE — 1160F RVW MEDS BY RX/DR IN RCRD: CPT

## 2025-09-02 RX ORDER — FUROSEMIDE 20 MG/1
20 TABLET ORAL DAILY
Qty: 90 TABLET | Refills: 1 | Status: SHIPPED | OUTPATIENT
Start: 2025-09-02

## 2025-09-02 RX ORDER — FUROSEMIDE 20 MG/1
20 TABLET ORAL DAILY
Qty: 90 TABLET | Refills: 1 | OUTPATIENT
Start: 2025-09-02